# Patient Record
Sex: FEMALE | Race: BLACK OR AFRICAN AMERICAN | NOT HISPANIC OR LATINO | Employment: UNEMPLOYED | URBAN - METROPOLITAN AREA
[De-identification: names, ages, dates, MRNs, and addresses within clinical notes are randomized per-mention and may not be internally consistent; named-entity substitution may affect disease eponyms.]

---

## 2017-12-11 ENCOUNTER — HISTORICAL (OUTPATIENT)
Dept: INTERNAL MEDICINE | Facility: CLINIC | Age: 27
End: 2017-12-11

## 2017-12-11 LAB
ABS NEUT (OLG): 4.3 X10(3)/MCL (ref 2.1–9.2)
ALBUMIN SERPL-MCNC: 3.4 GM/DL (ref 3.4–5)
ALBUMIN/GLOB SERPL: 1 RATIO (ref 1–2)
ALP SERPL-CCNC: 84 UNIT/L (ref 45–117)
ALT SERPL-CCNC: 31 UNIT/L (ref 12–78)
APPEARANCE, UA: ABNORMAL
AST SERPL-CCNC: 20 UNIT/L (ref 15–37)
BACTERIA #/AREA URNS AUTO: ABNORMAL /[HPF]
BASOPHILS # BLD AUTO: 0.02 X10(3)/MCL
BASOPHILS NFR BLD AUTO: 0 % (ref 0–1)
BILIRUB SERPL-MCNC: 0.4 MG/DL (ref 0.2–1)
BILIRUB UR QL STRIP: NEGATIVE
BILIRUBIN DIRECT+TOT PNL SERPL-MCNC: 0.1 MG/DL
BILIRUBIN DIRECT+TOT PNL SERPL-MCNC: 0.3 MG/DL
BUN SERPL-MCNC: 15 MG/DL (ref 7–18)
CALCIUM SERPL-MCNC: 8.3 MG/DL (ref 8.5–10.1)
CHLORIDE SERPL-SCNC: 107 MMOL/L (ref 98–107)
CHOLEST SERPL-MCNC: 117 MG/DL
CHOLEST/HDLC SERPL: 2.2 {RATIO} (ref 0–4.4)
CO2 SERPL-SCNC: 27 MMOL/L (ref 21–32)
COLOR UR: YELLOW
CREAT SERPL-MCNC: 1 MG/DL (ref 0.6–1.3)
EOSINOPHIL # BLD AUTO: 0.1 10*3/UL
EOSINOPHIL NFR BLD AUTO: 1 % (ref 0–5)
ERYTHROCYTE [DISTWIDTH] IN BLOOD BY AUTOMATED COUNT: 14.3 % (ref 11.5–14.5)
EST. AVERAGE GLUCOSE BLD GHB EST-MCNC: 111 MG/DL
GLOBULIN SER-MCNC: 3.6 GM/ML (ref 2.3–3.5)
GLUCOSE (UA): NORMAL
GLUCOSE SERPL-MCNC: 98 MG/DL (ref 74–106)
HAV IGM SERPL QL IA: NONREACTIVE
HBA1C MFR BLD: 5.5 % (ref 4.2–6.3)
HBV CORE IGM SERPL QL IA: NONREACTIVE
HBV SURFACE AG SERPL QL IA: NEGATIVE
HCT VFR BLD AUTO: 37.2 % (ref 35–46)
HCV AB SERPL QL IA: NONREACTIVE
HDLC SERPL-MCNC: 54 MG/DL
HGB BLD-MCNC: 11.9 GM/DL (ref 12–16)
HGB UR QL STRIP: 0.1 MG/DL
HIV 1+2 AB+HIV1 P24 AG SERPL QL IA: NONREACTIVE
HYALINE CASTS #/AREA URNS LPF: ABNORMAL /[LPF]
IMM GRANULOCYTES # BLD AUTO: 0.02 10*3/UL
IMM GRANULOCYTES NFR BLD AUTO: 0 %
KETONES UR QL STRIP: NEGATIVE
LDLC SERPL CALC-MCNC: 47 MG/DL (ref 0–130)
LEUKOCYTE ESTERASE UR QL STRIP: NEGATIVE
LYMPHOCYTES # BLD AUTO: 2.92 X10(3)/MCL
LYMPHOCYTES NFR BLD AUTO: 38 % (ref 15–40)
MCH RBC QN AUTO: 29.2 PG (ref 26–34)
MCHC RBC AUTO-ENTMCNC: 32 GM/DL (ref 31–37)
MCV RBC AUTO: 91.2 FL (ref 80–100)
MONOCYTES # BLD AUTO: 0.33 X10(3)/MCL
MONOCYTES NFR BLD AUTO: 4 % (ref 4–12)
NEUTROPHILS # BLD AUTO: 4.3 X10(3)/MCL
NEUTROPHILS NFR BLD AUTO: 56 X10(3)/MCL
NITRITE UR QL STRIP: NEGATIVE
PH UR STRIP: 5.5 [PH] (ref 4.5–8)
PLATELET # BLD AUTO: 240 X10(3)/MCL (ref 130–400)
PMV BLD AUTO: 11.2 FL (ref 7.4–10.4)
POTASSIUM SERPL-SCNC: 3.8 MMOL/L (ref 3.5–5.1)
PROT SERPL-MCNC: 7 GM/DL (ref 6.4–8.2)
PROT UR QL STRIP: 10 MG/DL
RBC # BLD AUTO: 4.08 X10(6)/MCL (ref 4–5.2)
RBC #/AREA URNS AUTO: ABNORMAL /[HPF]
SODIUM SERPL-SCNC: 142 MMOL/L (ref 136–145)
SP GR UR STRIP: 1.02 (ref 1–1.03)
SQUAMOUS #/AREA URNS LPF: >100 /[LPF]
T PALLIDUM AB SER QL: NONREACTIVE
T4 FREE SERPL-MCNC: 0.96 NG/DL (ref 0.76–1.46)
TRIGL SERPL-MCNC: 80 MG/DL
TSH SERPL-ACNC: 5.72 MIU/L (ref 0.36–3.74)
UROBILINOGEN UR STRIP-ACNC: NORMAL
VLDLC SERPL CALC-MCNC: 16 MG/DL
WBC # SPEC AUTO: 7.7 X10(3)/MCL (ref 4.5–11)
WBC #/AREA URNS AUTO: ABNORMAL /HPF

## 2018-04-11 ENCOUNTER — HISTORICAL (OUTPATIENT)
Dept: INTERNAL MEDICINE | Facility: CLINIC | Age: 28
End: 2018-04-11

## 2018-04-11 LAB — TSH SERPL-ACNC: 2.12 MIU/L (ref 0.36–3.74)

## 2018-12-12 ENCOUNTER — HISTORICAL (OUTPATIENT)
Dept: ADMINISTRATIVE | Facility: HOSPITAL | Age: 28
End: 2018-12-12

## 2018-12-12 LAB
ABS NEUT (OLG): 5.39 X10(3)/MCL (ref 2.1–9.2)
ALBUMIN SERPL-MCNC: 3.5 GM/DL (ref 3.4–5)
ALBUMIN/GLOB SERPL: 1 RATIO (ref 1–2)
ALP SERPL-CCNC: 90 UNIT/L (ref 45–117)
ALT SERPL-CCNC: 33 UNIT/L (ref 12–78)
APPEARANCE, UA: ABNORMAL
AST SERPL-CCNC: 16 UNIT/L (ref 15–37)
BACTERIA #/AREA URNS AUTO: ABNORMAL /[HPF]
BASOPHILS # BLD AUTO: 0.03 X10(3)/MCL
BASOPHILS NFR BLD AUTO: 0 %
BILIRUB SERPL-MCNC: 0.5 MG/DL (ref 0.2–1)
BILIRUB UR QL STRIP: NEGATIVE
BILIRUBIN DIRECT+TOT PNL SERPL-MCNC: 0.1 MG/DL
BILIRUBIN DIRECT+TOT PNL SERPL-MCNC: 0.4 MG/DL
BUN SERPL-MCNC: 13 MG/DL (ref 7–18)
CALCIUM SERPL-MCNC: 8.7 MG/DL (ref 8.5–10.1)
CHLORIDE SERPL-SCNC: 109 MMOL/L (ref 98–107)
CHOLEST SERPL-MCNC: 121 MG/DL
CHOLEST/HDLC SERPL: 2.6 {RATIO} (ref 0–4.4)
CO2 SERPL-SCNC: 26 MMOL/L (ref 21–32)
COLOR UR: ABNORMAL
CREAT SERPL-MCNC: 0.9 MG/DL (ref 0.6–1.3)
EOSINOPHIL # BLD AUTO: 0.08 X10(3)/MCL
EOSINOPHIL NFR BLD AUTO: 1 %
ERYTHROCYTE [DISTWIDTH] IN BLOOD BY AUTOMATED COUNT: 14.6 % (ref 11.5–14.5)
EST. AVERAGE GLUCOSE BLD GHB EST-MCNC: 128 MG/DL
GLOBULIN SER-MCNC: 4.2 GM/ML (ref 2.3–3.5)
GLUCOSE (UA): NORMAL
GLUCOSE SERPL-MCNC: 86 MG/DL (ref 74–106)
HAV IGM SERPL QL IA: NONREACTIVE
HBA1C MFR BLD: 6.1 % (ref 4.2–6.3)
HBV CORE IGM SERPL QL IA: NONREACTIVE
HBV SURFACE AG SERPL QL IA: NEGATIVE
HCT VFR BLD AUTO: 38.6 % (ref 35–46)
HCV AB SERPL QL IA: NONREACTIVE
HDLC SERPL-MCNC: 46 MG/DL
HGB BLD-MCNC: 12.1 GM/DL (ref 12–16)
HGB UR QL STRIP: 0.03 MG/DL
HIV 1+2 AB+HIV1 P24 AG SERPL QL IA: NONREACTIVE
HYALINE CASTS #/AREA URNS LPF: ABNORMAL /[LPF]
IMM GRANULOCYTES # BLD AUTO: 0.01 10*3/UL
IMM GRANULOCYTES NFR BLD AUTO: 0 %
KETONES UR QL STRIP: NEGATIVE
LDLC SERPL CALC-MCNC: 56 MG/DL (ref 0–130)
LEUKOCYTE ESTERASE UR QL STRIP: NEGATIVE
LYMPHOCYTES # BLD AUTO: 2.54 X10(3)/MCL
LYMPHOCYTES NFR BLD AUTO: 30 % (ref 13–40)
MCH RBC QN AUTO: 27.9 PG (ref 26–34)
MCHC RBC AUTO-ENTMCNC: 31.3 GM/DL (ref 31–37)
MCV RBC AUTO: 88.9 FL (ref 80–100)
MONOCYTES # BLD AUTO: 0.35 X10(3)/MCL
MONOCYTES NFR BLD AUTO: 4 % (ref 4–12)
NEUTROPHILS # BLD AUTO: 5.39 X10(3)/MCL
NEUTROPHILS NFR BLD AUTO: 64 X10(3)/MCL
NITRITE UR QL STRIP: NEGATIVE
PH UR STRIP: 5.5 [PH] (ref 4.5–8)
PLATELET # BLD AUTO: 239 X10(3)/MCL (ref 130–400)
PMV BLD AUTO: 11.3 FL (ref 7.4–10.4)
POTASSIUM SERPL-SCNC: 4.1 MMOL/L (ref 3.5–5.1)
PROT SERPL-MCNC: 7.7 GM/DL (ref 6.4–8.2)
PROT UR QL STRIP: NEGATIVE
RBC # BLD AUTO: 4.34 X10(6)/MCL (ref 4–5.2)
RBC #/AREA URNS AUTO: ABNORMAL /[HPF]
SODIUM SERPL-SCNC: 140 MMOL/L (ref 136–145)
SP GR UR STRIP: 1.02 (ref 1–1.03)
SQUAMOUS #/AREA URNS LPF: >100 /[LPF]
T PALLIDUM AB SER QL: NONREACTIVE
TRIGL SERPL-MCNC: 93 MG/DL
TSH SERPL-ACNC: 2.97 MIU/L (ref 0.36–3.74)
UROBILINOGEN UR STRIP-ACNC: NORMAL
VLDLC SERPL CALC-MCNC: 19 MG/DL
WBC # SPEC AUTO: 8.4 X10(3)/MCL (ref 4.5–11)
WBC #/AREA URNS AUTO: ABNORMAL /HPF

## 2019-12-11 ENCOUNTER — HISTORICAL (OUTPATIENT)
Dept: INTERNAL MEDICINE | Facility: CLINIC | Age: 29
End: 2019-12-11

## 2019-12-11 LAB
ABS NEUT (OLG): 4.58 X10(3)/MCL (ref 2.1–9.2)
ALBUMIN SERPL-MCNC: 3.3 GM/DL (ref 3.4–5)
ALBUMIN/GLOB SERPL: 0.8 RATIO (ref 1.1–2)
ALP SERPL-CCNC: 89 UNIT/L (ref 45–117)
ALT SERPL-CCNC: 33 UNIT/L (ref 12–78)
APPEARANCE, UA: ABNORMAL
AST SERPL-CCNC: 18 UNIT/L (ref 15–37)
BACTERIA #/AREA URNS AUTO: ABNORMAL /HPF
BASOPHILS # BLD AUTO: 0 X10(3)/MCL (ref 0–0.2)
BASOPHILS NFR BLD AUTO: 0 %
BILIRUB SERPL-MCNC: 0.4 MG/DL (ref 0.2–1)
BILIRUB UR QL STRIP: NEGATIVE
BILIRUBIN DIRECT+TOT PNL SERPL-MCNC: 0.1 MG/DL (ref 0–0.2)
BILIRUBIN DIRECT+TOT PNL SERPL-MCNC: 0.3 MG/DL
BUN SERPL-MCNC: 13 MG/DL (ref 7–18)
CALCIUM SERPL-MCNC: 8.8 MG/DL (ref 8.5–10.1)
CHLORIDE SERPL-SCNC: 108 MMOL/L (ref 98–107)
CHOLEST SERPL-MCNC: 137 MG/DL
CHOLEST/HDLC SERPL: 2.5 {RATIO} (ref 0–4.4)
CO2 SERPL-SCNC: 30 MMOL/L (ref 21–32)
COLOR UR: YELLOW
CREAT SERPL-MCNC: 0.9 MG/DL (ref 0.6–1.3)
EOSINOPHIL # BLD AUTO: 0.1 X10(3)/MCL (ref 0–0.9)
EOSINOPHIL NFR BLD AUTO: 2 %
ERYTHROCYTE [DISTWIDTH] IN BLOOD BY AUTOMATED COUNT: 14.2 % (ref 11.5–14.5)
EST. AVERAGE GLUCOSE BLD GHB EST-MCNC: 126 MG/DL
GLOBULIN SER-MCNC: 3.9 GM/ML (ref 2.3–3.5)
GLUCOSE (UA): NEGATIVE
GLUCOSE SERPL-MCNC: 112 MG/DL (ref 74–106)
HAV IGM SERPL QL IA: NONREACTIVE
HBA1C MFR BLD: 6 % (ref 4.2–6.3)
HBV CORE IGM SERPL QL IA: NONREACTIVE
HBV SURFACE AG SERPL QL IA: NEGATIVE
HCT VFR BLD AUTO: 38.6 % (ref 35–46)
HCV AB SERPL QL IA: NONREACTIVE
HDLC SERPL-MCNC: 54 MG/DL (ref 40–59)
HGB BLD-MCNC: 11.8 GM/DL (ref 12–16)
HGB UR QL STRIP: 0.03 MG/DL
HIV 1+2 AB+HIV1 P24 AG SERPL QL IA: NONREACTIVE
HYALINE CASTS #/AREA URNS LPF: ABNORMAL /LPF
IMM GRANULOCYTES # BLD AUTO: 0.02 10*3/UL
IMM GRANULOCYTES NFR BLD AUTO: 0 %
KETONES UR QL STRIP: NEGATIVE
LDLC SERPL CALC-MCNC: 65 MG/DL
LEUKOCYTE ESTERASE UR QL STRIP: NEGATIVE
LYMPHOCYTES # BLD AUTO: 2.1 X10(3)/MCL (ref 0.6–4.6)
LYMPHOCYTES NFR BLD AUTO: 29 %
MCH RBC QN AUTO: 28.7 PG (ref 26–34)
MCHC RBC AUTO-ENTMCNC: 30.6 GM/DL (ref 31–37)
MCV RBC AUTO: 93.9 FL (ref 80–100)
MONOCYTES # BLD AUTO: 0.3 X10(3)/MCL (ref 0.1–1.3)
MONOCYTES NFR BLD AUTO: 5 %
NEUTROPHILS # BLD AUTO: 4.58 X10(3)/MCL (ref 2.1–9.2)
NEUTROPHILS NFR BLD AUTO: 64 %
NITRITE UR QL STRIP: NEGATIVE
PH UR STRIP: 5.5 [PH] (ref 4.5–8)
PLATELET # BLD AUTO: 231 X10(3)/MCL (ref 130–400)
PMV BLD AUTO: 11.1 FL (ref 7.4–10.4)
POTASSIUM SERPL-SCNC: 4.1 MMOL/L (ref 3.5–5.1)
PROT SERPL-MCNC: 7.2 GM/DL (ref 6.4–8.2)
PROT UR QL STRIP: NEGATIVE
RBC # BLD AUTO: 4.11 X10(6)/MCL (ref 4–5.2)
RBC #/AREA URNS AUTO: ABNORMAL /HPF
SODIUM SERPL-SCNC: 141 MMOL/L (ref 136–145)
SP GR UR STRIP: 1.02 (ref 1–1.03)
SQUAMOUS #/AREA URNS LPF: >100 /LPF
T PALLIDUM AB SER QL: NONREACTIVE
TRIGL SERPL-MCNC: 92 MG/DL
TSH SERPL-ACNC: 2.98 MIU/L (ref 0.36–3.74)
UROBILINOGEN UR STRIP-ACNC: NORMAL
VLDLC SERPL CALC-MCNC: 18 MG/DL
WBC # SPEC AUTO: 7.2 X10(3)/MCL (ref 4.5–11)
WBC #/AREA URNS AUTO: ABNORMAL /HPF

## 2019-12-12 ENCOUNTER — HISTORICAL (OUTPATIENT)
Dept: ADMINISTRATIVE | Facility: HOSPITAL | Age: 29
End: 2019-12-12

## 2019-12-12 LAB — B-HCG SERPL QL: NEGATIVE

## 2020-12-14 ENCOUNTER — HISTORICAL (OUTPATIENT)
Dept: ADMINISTRATIVE | Facility: HOSPITAL | Age: 30
End: 2020-12-14

## 2020-12-14 LAB
ABS NEUT (OLG): 4.69 X10(3)/MCL (ref 2.1–9.2)
ALBUMIN SERPL-MCNC: 3.6 GM/DL (ref 3.5–5)
ALBUMIN/GLOB SERPL: 0.9 RATIO (ref 1.1–2)
ALP SERPL-CCNC: 88 UNIT/L (ref 40–150)
ALT SERPL-CCNC: 28 UNIT/L (ref 0–55)
APPEARANCE, UA: ABNORMAL
AST SERPL-CCNC: 17 UNIT/L (ref 5–34)
BACTERIA #/AREA URNS AUTO: ABNORMAL /HPF
BASOPHILS # BLD AUTO: 0 X10(3)/MCL (ref 0–0.2)
BASOPHILS NFR BLD AUTO: 0 %
BILIRUB SERPL-MCNC: 0.4 MG/DL
BILIRUB UR QL STRIP: NEGATIVE
BILIRUBIN DIRECT+TOT PNL SERPL-MCNC: 0.2 MG/DL (ref 0–0.5)
BILIRUBIN DIRECT+TOT PNL SERPL-MCNC: 0.2 MG/DL (ref 0–0.8)
BUN SERPL-MCNC: 12 MG/DL (ref 7–18.7)
CALCIUM SERPL-MCNC: 9 MG/DL (ref 8.4–10.2)
CHLORIDE SERPL-SCNC: 105 MMOL/L (ref 98–107)
CHOLEST SERPL-MCNC: 118 MG/DL
CHOLEST/HDLC SERPL: 3 {RATIO} (ref 0–5)
CO2 SERPL-SCNC: 25 MMOL/L (ref 22–29)
COLOR UR: YELLOW
CREAT SERPL-MCNC: 0.81 MG/DL (ref 0.55–1.02)
EOSINOPHIL # BLD AUTO: 0.1 X10(3)/MCL (ref 0–0.9)
EOSINOPHIL NFR BLD AUTO: 1 %
ERYTHROCYTE [DISTWIDTH] IN BLOOD BY AUTOMATED COUNT: 14.6 % (ref 11.5–14.5)
EST. AVERAGE GLUCOSE BLD GHB EST-MCNC: 119.8 MG/DL
GLOBULIN SER-MCNC: 4 GM/DL (ref 2.4–3.5)
GLUCOSE (UA): NEGATIVE
GLUCOSE SERPL-MCNC: 112 MG/DL (ref 74–100)
HBA1C MFR BLD: 5.8 %
HCT VFR BLD AUTO: 33.8 % (ref 35–46)
HDLC SERPL-MCNC: 47 MG/DL (ref 35–60)
HGB BLD-MCNC: 9.8 GM/DL (ref 12–16)
HGB UR QL STRIP: 0.03 MG/DL
HIV 1+2 AB+HIV1 P24 AG SERPL QL IA: NONREACTIVE
HYALINE CASTS #/AREA URNS LPF: ABNORMAL /LPF
IMM GRANULOCYTES # BLD AUTO: 0.02 10*3/UL
IMM GRANULOCYTES NFR BLD AUTO: 0 %
KETONES UR QL STRIP: NEGATIVE
LDLC SERPL CALC-MCNC: 55 MG/DL (ref 50–140)
LEUKOCYTE ESTERASE UR QL STRIP: NEGATIVE
LYMPHOCYTES # BLD AUTO: 1.8 X10(3)/MCL (ref 0.6–4.6)
LYMPHOCYTES NFR BLD AUTO: 26 %
MCH RBC QN AUTO: 26.6 PG (ref 26–34)
MCHC RBC AUTO-ENTMCNC: 29 GM/DL (ref 31–37)
MCV RBC AUTO: 91.6 FL (ref 80–100)
MONOCYTES # BLD AUTO: 0.3 X10(3)/MCL (ref 0.1–1.3)
MONOCYTES NFR BLD AUTO: 4 %
NEUTROPHILS # BLD AUTO: 4.69 X10(3)/MCL (ref 2.1–9.2)
NEUTROPHILS NFR BLD AUTO: 68 %
NITRITE UR QL STRIP: NEGATIVE
PH UR STRIP: 5.5 [PH] (ref 4.5–8)
PLATELET # BLD AUTO: 281 X10(3)/MCL (ref 130–400)
PMV BLD AUTO: 11.1 FL (ref 7.4–10.4)
POTASSIUM SERPL-SCNC: 4.1 MMOL/L (ref 3.5–5.1)
PROT SERPL-MCNC: 7.6 GM/DL (ref 6.4–8.3)
PROT UR QL STRIP: 10 MG/DL
RBC # BLD AUTO: 3.69 X10(6)/MCL (ref 4–5.2)
RBC #/AREA URNS AUTO: ABNORMAL /HPF
SODIUM SERPL-SCNC: 139 MMOL/L (ref 136–145)
SP GR UR STRIP: 1.02 (ref 1–1.03)
SQUAMOUS #/AREA URNS LPF: >100 /LPF
TRIGL SERPL-MCNC: 81 MG/DL (ref 37–140)
TSH SERPL-ACNC: 2.47 UIU/ML (ref 0.35–4.94)
UROBILINOGEN UR STRIP-ACNC: NORMAL
VLDLC SERPL CALC-MCNC: 16 MG/DL
WBC # SPEC AUTO: 6.9 X10(3)/MCL (ref 4.5–11)
WBC #/AREA URNS AUTO: ABNORMAL /HPF

## 2021-01-14 ENCOUNTER — HISTORICAL (OUTPATIENT)
Dept: RADIOLOGY | Facility: HOSPITAL | Age: 31
End: 2021-01-14

## 2021-03-02 ENCOUNTER — HISTORICAL (OUTPATIENT)
Dept: RADIOLOGY | Facility: HOSPITAL | Age: 31
End: 2021-03-02

## 2021-11-02 ENCOUNTER — HISTORICAL (OUTPATIENT)
Dept: ADMINISTRATIVE | Facility: HOSPITAL | Age: 31
End: 2021-11-02

## 2021-12-07 ENCOUNTER — HISTORICAL (OUTPATIENT)
Dept: INTERNAL MEDICINE | Facility: CLINIC | Age: 31
End: 2021-12-07

## 2021-12-07 LAB
ABS NEUT (OLG): 4.61 X10(3)/MCL (ref 2.1–9.2)
ALBUMIN SERPL-MCNC: 3.5 GM/DL (ref 3.5–5)
ALBUMIN/GLOB SERPL: 1 RATIO (ref 1.1–2)
ALP SERPL-CCNC: 81 UNIT/L (ref 40–150)
ALT SERPL-CCNC: 26 UNIT/L (ref 0–55)
APPEARANCE, UA: CLEAR
AST SERPL-CCNC: 24 UNIT/L (ref 5–34)
BACTERIA #/AREA URNS AUTO: ABNORMAL /HPF
BASOPHILS # BLD AUTO: 0 X10(3)/MCL (ref 0–0.2)
BASOPHILS NFR BLD AUTO: 0 %
BILIRUB SERPL-MCNC: 0.4 MG/DL
BILIRUB UR QL STRIP: NEGATIVE
BILIRUBIN DIRECT+TOT PNL SERPL-MCNC: 0.2 MG/DL (ref 0–0.5)
BILIRUBIN DIRECT+TOT PNL SERPL-MCNC: 0.2 MG/DL (ref 0–0.8)
BUN SERPL-MCNC: 14 MG/DL (ref 7–18.7)
CALCIUM SERPL-MCNC: 9.1 MG/DL (ref 8.7–10.5)
CHLORIDE SERPL-SCNC: 107 MMOL/L (ref 98–107)
CHOLEST SERPL-MCNC: 132 MG/DL
CHOLEST/HDLC SERPL: 3 {RATIO} (ref 0–5)
CO2 SERPL-SCNC: 25 MMOL/L (ref 22–29)
COLOR UR: ABNORMAL
CREAT SERPL-MCNC: 0.77 MG/DL (ref 0.55–1.02)
EOSINOPHIL # BLD AUTO: 0.1 X10(3)/MCL (ref 0–0.9)
EOSINOPHIL NFR BLD AUTO: 2 %
ERYTHROCYTE [DISTWIDTH] IN BLOOD BY AUTOMATED COUNT: 14.1 % (ref 11.5–14.5)
EST. AVERAGE GLUCOSE BLD GHB EST-MCNC: 105.4 MG/DL
GLOBULIN SER-MCNC: 3.6 GM/DL (ref 2.4–3.5)
GLUCOSE (UA): NEGATIVE
GLUCOSE SERPL-MCNC: 106 MG/DL (ref 74–100)
HBA1C MFR BLD: 5.3 %
HCT VFR BLD AUTO: 36 % (ref 35–46)
HDLC SERPL-MCNC: 49 MG/DL (ref 35–60)
HGB BLD-MCNC: 11.3 GM/DL (ref 12–16)
HGB UR QL STRIP: 0.03 MG/DL
HYALINE CASTS #/AREA URNS LPF: ABNORMAL /LPF
IMM GRANULOCYTES # BLD AUTO: 0.03 10*3/UL
IMM GRANULOCYTES NFR BLD AUTO: 0 %
KETONES UR QL STRIP: NEGATIVE
LDLC SERPL CALC-MCNC: 67 MG/DL (ref 50–140)
LEUKOCYTE ESTERASE UR QL STRIP: NEGATIVE
LYMPHOCYTES # BLD AUTO: 2.1 X10(3)/MCL (ref 0.6–4.6)
LYMPHOCYTES NFR BLD AUTO: 29 %
MCH RBC QN AUTO: 28.8 PG (ref 26–34)
MCHC RBC AUTO-ENTMCNC: 31.4 GM/DL (ref 31–37)
MCV RBC AUTO: 91.6 FL (ref 80–100)
MONOCYTES # BLD AUTO: 0.3 X10(3)/MCL (ref 0.1–1.3)
MONOCYTES NFR BLD AUTO: 5 %
NEUTROPHILS # BLD AUTO: 4.61 X10(3)/MCL (ref 2.1–9.2)
NEUTROPHILS NFR BLD AUTO: 64 %
NITRITE UR QL STRIP: NEGATIVE
NRBC BLD AUTO-RTO: 0 % (ref 0–0.2)
PH UR STRIP: 5.5 [PH] (ref 4.5–8)
PLATELET # BLD AUTO: 232 X10(3)/MCL (ref 130–400)
PMV BLD AUTO: 11.1 FL (ref 7.4–10.4)
POTASSIUM SERPL-SCNC: 4.2 MMOL/L (ref 3.5–5.1)
PROT SERPL-MCNC: 7.1 GM/DL (ref 6.4–8.3)
PROT UR QL STRIP: NEGATIVE
RBC # BLD AUTO: 3.93 X10(6)/MCL (ref 4–5.2)
RBC #/AREA URNS AUTO: ABNORMAL /HPF
SODIUM SERPL-SCNC: 139 MMOL/L (ref 136–145)
SP GR UR STRIP: 1.02 (ref 1–1.03)
SQUAMOUS #/AREA URNS LPF: ABNORMAL /LPF
TRIGL SERPL-MCNC: 79 MG/DL (ref 37–140)
TSH SERPL-ACNC: 2.33 UIU/ML (ref 0.35–4.94)
UROBILINOGEN UR STRIP-ACNC: NORMAL
VLDLC SERPL CALC-MCNC: 16 MG/DL
WBC # SPEC AUTO: 7.2 X10(3)/MCL (ref 4.5–11)
WBC #/AREA URNS AUTO: ABNORMAL /HPF

## 2022-04-12 ENCOUNTER — HISTORICAL (OUTPATIENT)
Dept: ADMINISTRATIVE | Facility: HOSPITAL | Age: 32
End: 2022-04-12
Payer: MEDICAID

## 2022-04-30 VITALS
DIASTOLIC BLOOD PRESSURE: 81 MMHG | SYSTOLIC BLOOD PRESSURE: 122 MMHG | WEIGHT: 260.81 LBS | HEIGHT: 60 IN | BODY MASS INDEX: 51.2 KG/M2
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
## 2022-06-06 PROBLEM — G89.29 CHRONIC BILATERAL BACK PAIN: Status: ACTIVE | Noted: 2022-06-06

## 2022-06-06 PROBLEM — D50.0 IRON DEFICIENCY ANEMIA DUE TO CHRONIC BLOOD LOSS: Status: ACTIVE | Noted: 2022-06-06

## 2022-06-06 PROBLEM — R73.03 PREDIABETES: Status: ACTIVE | Noted: 2022-06-06

## 2022-06-06 PROBLEM — E66.01 MORBID OBESITY: Status: ACTIVE | Noted: 2022-06-06

## 2022-06-06 PROBLEM — M54.9 CHRONIC BILATERAL BACK PAIN: Status: ACTIVE | Noted: 2022-06-06
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
## 2022-06-08 ENCOUNTER — LAB VISIT (OUTPATIENT)
Dept: LAB | Facility: HOSPITAL | Age: 32
End: 2022-06-08
Attending: NURSE PRACTITIONER
Payer: MEDICAID

## 2022-06-08 ENCOUNTER — TELEPHONE (OUTPATIENT)
Dept: INTERNAL MEDICINE | Facility: CLINIC | Age: 32
End: 2022-06-08

## 2022-06-08 ENCOUNTER — OFFICE VISIT (OUTPATIENT)
Dept: INTERNAL MEDICINE | Facility: CLINIC | Age: 32
End: 2022-06-08
Payer: MEDICAID

## 2022-06-08 VITALS
SYSTOLIC BLOOD PRESSURE: 119 MMHG | HEIGHT: 60 IN | BODY MASS INDEX: 51.44 KG/M2 | TEMPERATURE: 98 F | RESPIRATION RATE: 20 BRPM | HEART RATE: 77 BPM | WEIGHT: 262 LBS | DIASTOLIC BLOOD PRESSURE: 75 MMHG

## 2022-06-08 DIAGNOSIS — D50.0 IRON DEFICIENCY ANEMIA DUE TO CHRONIC BLOOD LOSS: ICD-10-CM

## 2022-06-08 DIAGNOSIS — Z01.83 ENCOUNTER FOR BLOOD TYPING: ICD-10-CM

## 2022-06-08 DIAGNOSIS — N93.9 ABNORMAL UTERINE BLEEDING (AUB): ICD-10-CM

## 2022-06-08 DIAGNOSIS — N93.9 ABNORMAL UTERINE BLEEDING (AUB): Primary | ICD-10-CM

## 2022-06-08 LAB
ABORH RETYPE: NORMAL
B-HCG SERPL QL: NEGATIVE
BASOPHILS # BLD AUTO: 0.03 X10(3)/MCL (ref 0–0.2)
BASOPHILS NFR BLD AUTO: 0.3 %
EOSINOPHIL # BLD AUTO: 0.13 X10(3)/MCL (ref 0–0.9)
EOSINOPHIL NFR BLD AUTO: 1.5 %
ERYTHROCYTE [DISTWIDTH] IN BLOOD BY AUTOMATED COUNT: 14.2 % (ref 11.5–17)
FERRITIN SERPL-MCNC: 59.39 NG/ML (ref 4.63–204)
GROUP & RH: NORMAL
HCT VFR BLD AUTO: 40.3 % (ref 37–47)
HGB BLD-MCNC: 12.3 GM/DL (ref 12–16)
IMM GRANULOCYTES # BLD AUTO: 0.02 X10(3)/MCL (ref 0–0.02)
IMM GRANULOCYTES NFR BLD AUTO: 0.2 % (ref 0–0.43)
IRON SATN MFR SERPL: 34 % (ref 20–50)
IRON SERPL-MCNC: 113 UG/DL (ref 50–170)
LYMPHOCYTES # BLD AUTO: 2.42 X10(3)/MCL (ref 0.6–4.6)
LYMPHOCYTES NFR BLD AUTO: 27.9 %
MCH RBC QN AUTO: 29.2 PG (ref 27–31)
MCHC RBC AUTO-ENTMCNC: 30.5 MG/DL (ref 33–36)
MCV RBC AUTO: 95.7 FL (ref 80–94)
MONOCYTES # BLD AUTO: 0.39 X10(3)/MCL (ref 0.1–1.3)
MONOCYTES NFR BLD AUTO: 4.5 %
NEUTROPHILS # BLD AUTO: 5.7 X10(3)/MCL (ref 2.1–9.2)
NEUTROPHILS NFR BLD AUTO: 65.6 %
NRBC BLD AUTO-RTO: 0 %
PLATELET # BLD AUTO: 250 X10(3)/MCL (ref 130–400)
PMV BLD AUTO: 10.9 FL (ref 9.4–12.4)
RBC # BLD AUTO: 4.21 X10(6)/MCL (ref 4.2–5.4)
TIBC SERPL-MCNC: 217 UG/DL (ref 70–310)
TIBC SERPL-MCNC: 330 UG/DL (ref 250–450)
TRANSFERRIN SERPL-MCNC: 277 MG/DL (ref 180–382)
WBC # SPEC AUTO: 8.7 X10(3)/MCL (ref 4.5–11.5)

## 2022-06-08 PROCEDURE — 1160F RVW MEDS BY RX/DR IN RCRD: CPT | Mod: CPTII,,, | Performed by: NURSE PRACTITIONER

## 2022-06-08 PROCEDURE — 99215 OFFICE O/P EST HI 40 MIN: CPT | Mod: PBBFAC | Performed by: NURSE PRACTITIONER

## 2022-06-08 PROCEDURE — 3078F DIAST BP <80 MM HG: CPT | Mod: CPTII,,, | Performed by: NURSE PRACTITIONER

## 2022-06-08 PROCEDURE — 3078F PR MOST RECENT DIASTOLIC BLOOD PRESSURE < 80 MM HG: ICD-10-PCS | Mod: CPTII,,, | Performed by: NURSE PRACTITIONER

## 2022-06-08 PROCEDURE — 85025 COMPLETE CBC W/AUTO DIFF WBC: CPT

## 2022-06-08 PROCEDURE — 99214 OFFICE O/P EST MOD 30 MIN: CPT | Mod: S$PBB,,, | Performed by: NURSE PRACTITIONER

## 2022-06-08 PROCEDURE — 1159F PR MEDICATION LIST DOCUMENTED IN MEDICAL RECORD: ICD-10-PCS | Mod: CPTII,,, | Performed by: NURSE PRACTITIONER

## 2022-06-08 PROCEDURE — 36415 COLL VENOUS BLD VENIPUNCTURE: CPT

## 2022-06-08 PROCEDURE — 3074F SYST BP LT 130 MM HG: CPT | Mod: CPTII,,, | Performed by: NURSE PRACTITIONER

## 2022-06-08 PROCEDURE — 99214 PR OFFICE/OUTPT VISIT, EST, LEVL IV, 30-39 MIN: ICD-10-PCS | Mod: S$PBB,,, | Performed by: NURSE PRACTITIONER

## 2022-06-08 PROCEDURE — 1159F MED LIST DOCD IN RCRD: CPT | Mod: CPTII,,, | Performed by: NURSE PRACTITIONER

## 2022-06-08 PROCEDURE — 1160F PR REVIEW ALL MEDS BY PRESCRIBER/CLIN PHARMACIST DOCUMENTED: ICD-10-PCS | Mod: CPTII,,, | Performed by: NURSE PRACTITIONER

## 2022-06-08 PROCEDURE — 3074F PR MOST RECENT SYSTOLIC BLOOD PRESSURE < 130 MM HG: ICD-10-PCS | Mod: CPTII,,, | Performed by: NURSE PRACTITIONER

## 2022-06-08 PROCEDURE — 3008F BODY MASS INDEX DOCD: CPT | Mod: CPTII,,, | Performed by: NURSE PRACTITIONER

## 2022-06-08 PROCEDURE — 83540 ASSAY OF IRON: CPT

## 2022-06-08 PROCEDURE — 81025 URINE PREGNANCY TEST: CPT

## 2022-06-08 PROCEDURE — 86901 BLOOD TYPING SEROLOGIC RH(D): CPT | Performed by: NURSE PRACTITIONER

## 2022-06-08 PROCEDURE — 3008F PR BODY MASS INDEX (BMI) DOCUMENTED: ICD-10-PCS | Mod: CPTII,,, | Performed by: NURSE PRACTITIONER

## 2022-06-08 PROCEDURE — 82728 ASSAY OF FERRITIN: CPT

## 2022-06-08 RX ORDER — CHLORHEXIDINE GLUCONATE ORAL RINSE 1.2 MG/ML
SOLUTION DENTAL
COMMUNITY
Start: 2022-06-06

## 2022-06-08 RX ORDER — AMOXICILLIN 500 MG/1
500 CAPSULE ORAL 4 TIMES DAILY
COMMUNITY
Start: 2022-06-06 | End: 2023-04-03

## 2022-06-08 RX ORDER — IBUPROFEN 800 MG/1
800 TABLET ORAL EVERY 6 HOURS PRN
COMMUNITY
Start: 2022-06-06 | End: 2024-02-09 | Stop reason: SDUPTHER

## 2022-06-08 RX ORDER — DEXAMETHASONE 0.75 MG/1
0.75 TABLET ORAL DAILY
COMMUNITY
Start: 2022-06-06 | End: 2023-04-03

## 2022-06-08 NOTE — TELEPHONE ENCOUNTER
Please inform patient that:  H/H is now WNL and improved from several past assessments.    Iron studies and stores are all WNL.    Pregnancy test:    Latest Reference Range & Units 06/08/22 09:39   Preg Test, Ur Negative  Negative     Blood type:    06/08/22 09:39   Group & Rh O POS       Will await pelvic US and GYN appt.  ED for acute concerns.     No

## 2022-06-10 ENCOUNTER — HOSPITAL ENCOUNTER (OUTPATIENT)
Dept: RADIOLOGY | Facility: HOSPITAL | Age: 32
Discharge: HOME | End: 2022-06-10
Attending: NURSE PRACTITIONER
Payer: MEDICAID

## 2022-06-10 DIAGNOSIS — N93.9 ABNORMAL UTERINE BLEEDING (AUB): ICD-10-CM

## 2022-06-10 PROCEDURE — 76830 TRANSVAGINAL US NON-OB: CPT | Mod: TC

## 2022-06-14 ENCOUNTER — TELEPHONE (OUTPATIENT)
Dept: INTERNAL MEDICINE | Facility: CLINIC | Age: 32
End: 2022-06-14
Payer: MEDICAID

## 2022-06-14 NOTE — TELEPHONE ENCOUNTER
Overall, pelvic US did not reveal any overt abnormalities, other than a thickened lining of her uterus. This can change depending on where a person is in their menstrual cycle.    She was previously informed that her labs were unremarkable. So, the next steps are to wait for an appt in GYN as previously discussed. Can give her the number to GYN clinic so she can call and check status of appts.

## 2022-12-07 ENCOUNTER — HOSPITAL ENCOUNTER (EMERGENCY)
Facility: HOSPITAL | Age: 32
Discharge: HOME | End: 2022-12-07
Attending: FAMILY MEDICINE
Payer: MEDICAID

## 2022-12-07 VITALS
DIASTOLIC BLOOD PRESSURE: 79 MMHG | BODY MASS INDEX: 52.14 KG/M2 | WEIGHT: 265.56 LBS | TEMPERATURE: 99 F | RESPIRATION RATE: 18 BRPM | SYSTOLIC BLOOD PRESSURE: 154 MMHG | HEIGHT: 60 IN | OXYGEN SATURATION: 98 % | HEART RATE: 110 BPM

## 2022-12-07 DIAGNOSIS — J10.1 INFLUENZA A: Primary | ICD-10-CM

## 2022-12-07 DIAGNOSIS — R05.9 COUGH: ICD-10-CM

## 2022-12-07 LAB
B-HCG UR QL: NEGATIVE
CTP QC/QA: YES
FLUAV AG UPPER RESP QL IA.RAPID: DETECTED
FLUBV AG UPPER RESP QL IA.RAPID: NOT DETECTED
RSV A 5' UTR RNA NPH QL NAA+PROBE: NOT DETECTED
SARS-COV-2 RNA RESP QL NAA+PROBE: NOT DETECTED

## 2022-12-07 PROCEDURE — 99284 EMERGENCY DEPT VISIT MOD MDM: CPT | Mod: 25

## 2022-12-07 PROCEDURE — 25000242 PHARM REV CODE 250 ALT 637 W/ HCPCS: Performed by: PHYSICIAN ASSISTANT

## 2022-12-07 PROCEDURE — 81025 URINE PREGNANCY TEST: CPT | Performed by: PHYSICIAN ASSISTANT

## 2022-12-07 PROCEDURE — 94640 AIRWAY INHALATION TREATMENT: CPT

## 2022-12-07 PROCEDURE — 0241U COVID/RSV/FLU A&B PCR: CPT | Performed by: PHYSICIAN ASSISTANT

## 2022-12-07 RX ORDER — OSELTAMIVIR PHOSPHATE 75 MG/1
75 CAPSULE ORAL 2 TIMES DAILY
Qty: 10 CAPSULE | Refills: 0 | Status: SHIPPED | OUTPATIENT
Start: 2022-12-07 | End: 2022-12-12

## 2022-12-07 RX ORDER — IPRATROPIUM BROMIDE AND ALBUTEROL SULFATE 2.5; .5 MG/3ML; MG/3ML
9 SOLUTION RESPIRATORY (INHALATION)
Status: DISPENSED | OUTPATIENT
Start: 2022-12-07 | End: 2022-12-07

## 2022-12-07 RX ORDER — PROMETHAZINE HYDROCHLORIDE AND DEXTROMETHORPHAN HYDROBROMIDE 6.25; 15 MG/5ML; MG/5ML
5 SYRUP ORAL EVERY 6 HOURS PRN
Qty: 120 ML | Refills: 0 | Status: SHIPPED | OUTPATIENT
Start: 2022-12-07 | End: 2022-12-17

## 2022-12-07 RX ORDER — ALBUTEROL SULFATE 90 UG/1
2 AEROSOL, METERED RESPIRATORY (INHALATION) EVERY 6 HOURS PRN
Qty: 6.7 G | Refills: 0 | Status: SHIPPED | OUTPATIENT
Start: 2022-12-07 | End: 2023-04-03

## 2022-12-07 RX ADMIN — IPRATROPIUM BROMIDE AND ALBUTEROL SULFATE 9 ML: .5; 3 SOLUTION RESPIRATORY (INHALATION) at 08:12

## 2022-12-07 NOTE — Clinical Note
"Nikky Wray" Abel was seen and treated in our emergency department on 12/7/2022.  She may return to work on 12/12/2022.       If you have any questions or concerns, please don't hesitate to call.      MP Samuel"

## 2022-12-08 NOTE — ED PROVIDER NOTES
Encounter Date: 12/7/2022       History     Chief Complaint   Patient presents with    Cough    Nasal Congestion     Pt c/o cough, congestion, fever, chills, and body aches x 2 weeks. States nothing OTC has helped.     33 YO AAF in ER with complaints of flu like symptoms  X several days. She just got over a cold about 1 week ago. Having wheezing, fever, chills, body aches, cough and congestion. Denies chest pain, SOB, abdominal pain, N/V/D, HA or dizziness. No other complaints.     The history is provided by the patient.   Review of patient's allergies indicates:  No Known Allergies  History reviewed. No pertinent past medical history.  History reviewed. No pertinent surgical history.  Family History   Problem Relation Age of Onset    Diabetes Father     Hypertension Father     Diabetes Maternal Grandmother     Hypertension Maternal Grandmother     Diabetes Paternal Grandmother     Hypertension Paternal Grandmother      Social History     Tobacco Use    Smoking status: Never    Smokeless tobacco: Never   Substance Use Topics    Alcohol use: Yes     Alcohol/week: 2.0 standard drinks     Types: 2 Cans of beer per week    Drug use: Never     Review of Systems   Constitutional:  Positive for chills and fever.   HENT:  Positive for congestion and rhinorrhea. Negative for sore throat.    Respiratory:  Positive for cough and wheezing. Negative for shortness of breath.    Cardiovascular:  Negative for chest pain.   Gastrointestinal:  Positive for nausea. Negative for abdominal pain, diarrhea and vomiting.   Genitourinary:  Negative for dysuria.   Musculoskeletal:  Negative for back pain.   Skin:  Negative for rash.   Neurological:  Negative for dizziness, weakness, light-headedness and headaches.   Hematological:  Does not bruise/bleed easily.   All other systems reviewed and are negative.    Physical Exam     Initial Vitals [12/07/22 1809]   BP Pulse Resp Temp SpO2   137/82 94 18 97.5 °F (36.4 °C) 98 %      MAP       --          Physical Exam    Nursing note and vitals reviewed.  Constitutional: She appears well-developed and well-nourished. She is not diaphoretic. No distress.   HENT:   Head: Normocephalic and atraumatic.   Right Ear: Tympanic membrane normal.   Left Ear: Tympanic membrane normal.   Nose: Mucosal edema and rhinorrhea present.   Mouth/Throat: Oropharynx is clear and moist and mucous membranes are normal.   Eyes: Conjunctivae are normal.   Neck: Neck supple.   Cardiovascular:  Normal rate, regular rhythm and normal heart sounds.           Pulmonary/Chest: She has wheezes in the right upper field, the right middle field, the right lower field, the left upper field, the left middle field and the left lower field.   Musculoskeletal:         General: Normal range of motion.      Cervical back: Neck supple.     Neurological: She is alert and oriented to person, place, and time.   Skin: Skin is warm and dry.   Psychiatric: She has a normal mood and affect.       ED Course   Procedures  Labs Reviewed   COVID/RSV/FLU A&B PCR - Abnormal; Notable for the following components:       Result Value    Influenza A PCR Detected (*)     All other components within normal limits    Narrative:     The Xpert Xpress SARS-CoV-2/FLU/RSV plus is a rapid, multiplexed real-time PCR test intended for the simultaneous qualitative detection and differentiation of SARS-CoV-2, Influenza A, Influenza B, and respiratory syncytial virus (RSV) viral RNA in either nasopharyngeal swab or nasal swab specimens.         POCT URINE PREGNANCY          Imaging Results              X-Ray Chest 1 View (Final result)  Result time 12/07/22 21:02:39      Final result by Moris Segura MD (12/07/22 21:02:39)                   Narrative:    EXAMINATION  XR CHEST 1 VIEW    CLINICAL HISTORY  Cough, unspecified    TECHNIQUE  A total of 1 frontal image(s) of the chest.    COMPARISON  None available at the time of initial interpretation.    FINDINGS  Lines/tubes/devices:  none present    The cardiomediastinal silhouette and central pulmonary vasculature are unremarkable for utilized technique.  The trachea is midline. There is no lobar consolidation, pleural effusion, or pneumothorax.    There is no acute osseous or extrathoracic abnormality.    IMPRESSION  No convincing acute radiographic abnormality.      Electronically signed by: Moris Segura  Date:    12/07/2022  Time:    21:02                                     Medications   albuterol-ipratropium 2.5 mg-0.5 mg/3 mL nebulizer solution 9 mL (9 mLs Nebulization Given 12/7/22 2010)     Medical Decision Making:   Clinical Tests:   Lab Tests: Ordered and Reviewed       <> Summary of Lab: Flu A positive  Radiological Study: Ordered and Reviewed           ED Course as of 12/07/22 2151   Wed Dec 07, 2022   2148 VSS, NAD, pt is non-toxic or ill appearing, labs and imaging reviewed with pt, treatment plan and discharge instructions including follow up discussed, pt verbalized understanding, all questions answered, pt is stable and ready for discharge  [TT]      ED Course User Index  [TT] MP Samuel                 Clinical Impression:   Final diagnoses:  [R05.9] Cough  [J10.1] Influenza A (Primary)        ED Disposition Condition    Discharge Stable          ED Prescriptions       Medication Sig Dispense Start Date End Date Auth. Provider    albuterol (PROVENTIL/VENTOLIN HFA) 90 mcg/actuation inhaler Inhale 2 puffs into the lungs every 6 (six) hours as needed for Wheezing. 6.7 g 12/7/2022 12/7/2023 MP Samuel    oseltamivir (TAMIFLU) 75 MG capsule Take 1 capsule (75 mg total) by mouth 2 (two) times daily. for 5 days 10 capsule 12/7/2022 12/12/2022 MP Samuel    promethazine-dextromethorphan (PROMETHAZINE-DM) 6.25-15 mg/5 mL Syrp Take 5 mLs by mouth every 6 (six) hours as needed (cough). 120 mL 12/7/2022 12/17/2022 MP Samuel          Follow-up Information       Follow up With Specialties Details Why Contact Info     [facility] Schedule an appointment as soon as possible for a visit in 3 days  [address] [facility] [address]  [phone]      [facility] Emergency Medicine In 3 days As needed, If symptoms worsen [address] [facility] [id number] [phone]             MP Samuel  12/07/22 2155

## 2023-04-03 ENCOUNTER — OFFICE VISIT (OUTPATIENT)
Dept: INTERNAL MEDICINE | Facility: CLINIC | Age: 33
End: 2023-04-03
Payer: MEDICAID

## 2023-04-03 VITALS
HEIGHT: 60 IN | TEMPERATURE: 98 F | DIASTOLIC BLOOD PRESSURE: 82 MMHG | BODY MASS INDEX: 54.06 KG/M2 | RESPIRATION RATE: 20 BRPM | SYSTOLIC BLOOD PRESSURE: 138 MMHG | WEIGHT: 275.38 LBS | HEART RATE: 78 BPM

## 2023-04-03 DIAGNOSIS — R73.03 PREDIABETES: ICD-10-CM

## 2023-04-03 DIAGNOSIS — G89.29 CHRONIC PAIN OF RIGHT ANKLE: ICD-10-CM

## 2023-04-03 DIAGNOSIS — M25.571 CHRONIC PAIN OF RIGHT ANKLE: ICD-10-CM

## 2023-04-03 DIAGNOSIS — D50.0 IRON DEFICIENCY ANEMIA DUE TO CHRONIC BLOOD LOSS: ICD-10-CM

## 2023-04-03 DIAGNOSIS — G89.29 CHRONIC BILATERAL LOW BACK PAIN, UNSPECIFIED WHETHER SCIATICA PRESENT: Primary | ICD-10-CM

## 2023-04-03 DIAGNOSIS — M54.50 CHRONIC BILATERAL LOW BACK PAIN, UNSPECIFIED WHETHER SCIATICA PRESENT: Primary | ICD-10-CM

## 2023-04-03 DIAGNOSIS — E55.9 VITAMIN D DEFICIENCY: ICD-10-CM

## 2023-04-03 PROCEDURE — 3075F PR MOST RECENT SYSTOLIC BLOOD PRESS GE 130-139MM HG: ICD-10-PCS | Mod: CPTII,,, | Performed by: NURSE PRACTITIONER

## 2023-04-03 PROCEDURE — 3008F BODY MASS INDEX DOCD: CPT | Mod: CPTII,,, | Performed by: NURSE PRACTITIONER

## 2023-04-03 PROCEDURE — 3079F PR MOST RECENT DIASTOLIC BLOOD PRESSURE 80-89 MM HG: ICD-10-PCS | Mod: CPTII,,, | Performed by: NURSE PRACTITIONER

## 2023-04-03 PROCEDURE — 3075F SYST BP GE 130 - 139MM HG: CPT | Mod: CPTII,,, | Performed by: NURSE PRACTITIONER

## 2023-04-03 PROCEDURE — 3079F DIAST BP 80-89 MM HG: CPT | Mod: CPTII,,, | Performed by: NURSE PRACTITIONER

## 2023-04-03 PROCEDURE — 99214 OFFICE O/P EST MOD 30 MIN: CPT | Mod: PBBFAC | Performed by: NURSE PRACTITIONER

## 2023-04-03 PROCEDURE — 1159F MED LIST DOCD IN RCRD: CPT | Mod: CPTII,,, | Performed by: NURSE PRACTITIONER

## 2023-04-03 PROCEDURE — 1159F PR MEDICATION LIST DOCUMENTED IN MEDICAL RECORD: ICD-10-PCS | Mod: CPTII,,, | Performed by: NURSE PRACTITIONER

## 2023-04-03 PROCEDURE — 1160F RVW MEDS BY RX/DR IN RCRD: CPT | Mod: CPTII,,, | Performed by: NURSE PRACTITIONER

## 2023-04-03 PROCEDURE — 99214 OFFICE O/P EST MOD 30 MIN: CPT | Mod: S$PBB,,, | Performed by: NURSE PRACTITIONER

## 2023-04-03 PROCEDURE — 99214 PR OFFICE/OUTPT VISIT, EST, LEVL IV, 30-39 MIN: ICD-10-PCS | Mod: S$PBB,,, | Performed by: NURSE PRACTITIONER

## 2023-04-03 PROCEDURE — 1160F PR REVIEW ALL MEDS BY PRESCRIBER/CLIN PHARMACIST DOCUMENTED: ICD-10-PCS | Mod: CPTII,,, | Performed by: NURSE PRACTITIONER

## 2023-04-03 PROCEDURE — 3008F PR BODY MASS INDEX (BMI) DOCUMENTED: ICD-10-PCS | Mod: CPTII,,, | Performed by: NURSE PRACTITIONER

## 2023-04-03 RX ORDER — ERGOCALCIFEROL 1.25 MG/1
50000 CAPSULE ORAL
Qty: 4 CAPSULE | Refills: 2 | Status: SHIPPED | OUTPATIENT
Start: 2023-04-03 | End: 2024-01-19 | Stop reason: SDUPTHER

## 2023-04-03 RX ORDER — FERROUS SULFATE TAB 325 MG (65 MG ELEMENTAL FE) 325 (65 FE) MG
TAB ORAL 2 TIMES DAILY
COMMUNITY
Start: 2022-12-02

## 2023-04-03 NOTE — ASSESSMENT & PLAN NOTE
Recommendations:  Rest  Avoid heavy lifting  Heat/Ice therapy PRN  Analgesics PRN (i.e., NSAIDs, acetaminophen if not contraindicated)  Low back exercises    P.T.

## 2023-04-03 NOTE — ASSESSMENT & PLAN NOTE
HgA1c: 6.4  Prediabetes: 5.7%-6.4%  Diabetes: 6.5% or greater  Recommendations:  Educated on a diabetic diet- Low-fat, Low-carb, Low-cholesterol. Instructed to avoid excessive intake of sugary/dark drinks/sodas and white foods (i.e., bread, pasta, potatoes, rice).  Encouraged aerobic exercise: 20-30 min/day x 5 days/week.
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
## 2023-04-05 ENCOUNTER — LAB VISIT (OUTPATIENT)
Dept: LAB | Facility: HOSPITAL | Age: 33
End: 2023-04-05
Attending: NURSE PRACTITIONER
Payer: MEDICAID

## 2023-04-05 DIAGNOSIS — D50.0 IRON DEFICIENCY ANEMIA DUE TO CHRONIC BLOOD LOSS: ICD-10-CM

## 2023-04-05 LAB
FERRITIN SERPL-MCNC: 33.76 NG/ML (ref 4.63–204)
IRON SATN MFR SERPL: 10 % (ref 20–50)
IRON SERPL-MCNC: 35 UG/DL (ref 50–170)
TIBC SERPL-MCNC: 326 UG/DL (ref 70–310)
TIBC SERPL-MCNC: 361 UG/DL (ref 250–450)
TRANSFERRIN SERPL-MCNC: 325 MG/DL (ref 180–382)

## 2023-04-05 PROCEDURE — 36415 COLL VENOUS BLD VENIPUNCTURE: CPT

## 2023-04-05 PROCEDURE — 82728 ASSAY OF FERRITIN: CPT

## 2023-04-05 PROCEDURE — 83550 IRON BINDING TEST: CPT

## 2023-05-23 ENCOUNTER — OFFICE VISIT (OUTPATIENT)
Dept: INTERNAL MEDICINE | Facility: CLINIC | Age: 33
End: 2023-05-23
Payer: MEDICAID

## 2023-05-23 VITALS
HEART RATE: 62 BPM | HEIGHT: 60 IN | SYSTOLIC BLOOD PRESSURE: 137 MMHG | BODY MASS INDEX: 53.6 KG/M2 | RESPIRATION RATE: 20 BRPM | DIASTOLIC BLOOD PRESSURE: 85 MMHG | WEIGHT: 273 LBS | TEMPERATURE: 98 F

## 2023-05-23 DIAGNOSIS — G89.29 CHRONIC PAIN OF RIGHT ANKLE: ICD-10-CM

## 2023-05-23 DIAGNOSIS — G89.29 CHRONIC BILATERAL LOW BACK PAIN, UNSPECIFIED WHETHER SCIATICA PRESENT: ICD-10-CM

## 2023-05-23 DIAGNOSIS — M54.50 CHRONIC BILATERAL LOW BACK PAIN, UNSPECIFIED WHETHER SCIATICA PRESENT: ICD-10-CM

## 2023-05-23 DIAGNOSIS — M25.571 CHRONIC PAIN OF RIGHT ANKLE: ICD-10-CM

## 2023-05-23 DIAGNOSIS — R73.03 PREDIABETES: Primary | ICD-10-CM

## 2023-05-23 DIAGNOSIS — M25.571 PAIN IN JOINT INVOLVING RIGHT ANKLE AND FOOT: ICD-10-CM

## 2023-05-23 DIAGNOSIS — D50.0 IRON DEFICIENCY ANEMIA DUE TO CHRONIC BLOOD LOSS: ICD-10-CM

## 2023-05-23 PROCEDURE — 3075F SYST BP GE 130 - 139MM HG: CPT | Mod: CPTII,,, | Performed by: NURSE PRACTITIONER

## 2023-05-23 PROCEDURE — 99215 OFFICE O/P EST HI 40 MIN: CPT | Mod: PBBFAC | Performed by: NURSE PRACTITIONER

## 2023-05-23 PROCEDURE — 1160F RVW MEDS BY RX/DR IN RCRD: CPT | Mod: CPTII,,, | Performed by: NURSE PRACTITIONER

## 2023-05-23 PROCEDURE — 1159F MED LIST DOCD IN RCRD: CPT | Mod: CPTII,,, | Performed by: NURSE PRACTITIONER

## 2023-05-23 PROCEDURE — 3079F PR MOST RECENT DIASTOLIC BLOOD PRESSURE 80-89 MM HG: ICD-10-PCS | Mod: CPTII,,, | Performed by: NURSE PRACTITIONER

## 2023-05-23 PROCEDURE — 3008F PR BODY MASS INDEX (BMI) DOCUMENTED: ICD-10-PCS | Mod: CPTII,,, | Performed by: NURSE PRACTITIONER

## 2023-05-23 PROCEDURE — 1160F PR REVIEW ALL MEDS BY PRESCRIBER/CLIN PHARMACIST DOCUMENTED: ICD-10-PCS | Mod: CPTII,,, | Performed by: NURSE PRACTITIONER

## 2023-05-23 PROCEDURE — 3075F PR MOST RECENT SYSTOLIC BLOOD PRESS GE 130-139MM HG: ICD-10-PCS | Mod: CPTII,,, | Performed by: NURSE PRACTITIONER

## 2023-05-23 PROCEDURE — 99215 PR OFFICE/OUTPT VISIT, EST, LEVL V, 40-54 MIN: ICD-10-PCS | Mod: S$PBB,,, | Performed by: NURSE PRACTITIONER

## 2023-05-23 PROCEDURE — 1159F PR MEDICATION LIST DOCUMENTED IN MEDICAL RECORD: ICD-10-PCS | Mod: CPTII,,, | Performed by: NURSE PRACTITIONER

## 2023-05-23 PROCEDURE — 3079F DIAST BP 80-89 MM HG: CPT | Mod: CPTII,,, | Performed by: NURSE PRACTITIONER

## 2023-05-23 PROCEDURE — 99215 OFFICE O/P EST HI 40 MIN: CPT | Mod: S$PBB,,, | Performed by: NURSE PRACTITIONER

## 2023-05-23 PROCEDURE — 3008F BODY MASS INDEX DOCD: CPT | Mod: CPTII,,, | Performed by: NURSE PRACTITIONER

## 2023-05-23 NOTE — ASSESSMENT & PLAN NOTE
HgA1c: 6.4 (per OBGYN), HgA1c 5.3 (21)  Prediabetes: 5.7%-6.4%  Diabetes: 6.5% or greater  Recommendations:  Educated on a diabetic diet- Low-fat, Low-carb, Low-cholesterol. Instructed to avoid excessive intake of sugary/dark drinks/sodas and white foods (i.e., bread, pasta, potatoes, rice).  Encouraged aerobic exercise: 20-30 min/day x 5 days/week.     Encouraged patient to complete labs as ordered. Apologized for lab issue, however, did inform patient that she was supposed to repeat labs mx times in the past but due to frequent no shows (9/15/22, 22, and 3/2023), her orders were . She states OBGYN told her there is no such thing as prediabetes and that she needs to see a specialist. I did inform patient that Endo here will only see patients with a significantly elevated A1c; we will need updated labs. States uncertain if she wants to start treatment or not. Agreeable to completing labs.
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
## 2023-06-13 ENCOUNTER — TELEPHONE (OUTPATIENT)
Dept: INTERNAL MEDICINE | Facility: CLINIC | Age: 33
End: 2023-06-13
Payer: MEDICAID

## 2023-06-13 NOTE — TELEPHONE ENCOUNTER
Please inform patient that her insurance stated they will not pay for the MRI of her ankle. This is the reason why:    (physical therapy, chiropractic treatments, or medically directed home exercise program) for four weeks in the last six months. We also need to know the number of visits you went to. We need to know the dates you did those exercises and that you did not get better before we can review for an approval      She would at least need to attempt P.T. a little longer. Otherwise, I can refer her to Ortho if she'd like, but they did deny coverage.

## 2023-06-14 ENCOUNTER — HOSPITAL ENCOUNTER (OUTPATIENT)
Dept: RADIOLOGY | Facility: HOSPITAL | Age: 33
Discharge: HOME | End: 2023-06-14
Attending: NURSE PRACTITIONER
Payer: MEDICAID

## 2023-06-14 DIAGNOSIS — M54.50 CHRONIC BILATERAL LOW BACK PAIN, UNSPECIFIED WHETHER SCIATICA PRESENT: ICD-10-CM

## 2023-06-14 DIAGNOSIS — G89.29 CHRONIC BILATERAL LOW BACK PAIN, UNSPECIFIED WHETHER SCIATICA PRESENT: ICD-10-CM

## 2023-06-14 NOTE — TELEPHONE ENCOUNTER
----- Message from Blanca Aparicio sent at 6/14/2023 12:15 PM CDT -----  Regarding: referral  Patient states MRI of right ankle was denied, requesting referral sent to building behind [facility].

## 2023-06-14 NOTE — PROGRESS NOTES
MRI Lumbar W/O contrast unable to be completed due to patient's body habitus. Bore of scanner is too tight on patient's arms and would not allow table to advance. Patient given a list of other facilities with larger scanners that may be able to accommodate.

## 2023-06-15 ENCOUNTER — HOSPITAL ENCOUNTER (OUTPATIENT)
Dept: RADIOLOGY | Facility: HOSPITAL | Age: 33
Discharge: HOME | End: 2023-06-15
Attending: NURSE PRACTITIONER
Payer: MEDICAID

## 2023-06-15 ENCOUNTER — PATIENT MESSAGE (OUTPATIENT)
Dept: INTERNAL MEDICINE | Facility: CLINIC | Age: 33
End: 2023-06-15
Payer: MEDICAID

## 2023-06-15 DIAGNOSIS — M25.571 RIGHT ANKLE PAIN, UNSPECIFIED CHRONICITY: Primary | ICD-10-CM

## 2023-06-15 PROCEDURE — 72148 MRI LUMBAR SPINE W/O DYE: CPT | Mod: TC

## 2023-06-19 ENCOUNTER — TELEPHONE (OUTPATIENT)
Dept: INTERNAL MEDICINE | Facility: CLINIC | Age: 33
End: 2023-06-19
Payer: MEDICAID

## 2023-06-19 NOTE — TELEPHONE ENCOUNTER
Please inform patient that her lumbar MRI revealed that her L5-S1 is displaying s/s of degenerative changes of the spine. This can lead to pain and decreased flexibility.    She may be a candidate for spinal injections for pain relief. I can refer if she's interested.    Otherwise, I would recommend maintaining a healthy weight/BMI, avoiding heavy lifting, and using pain relievers (ie, IBU) as needed. I am aware that she's already done or is doing some P.T.    If she wants a surgical spine consult, I can refer to [place] or [place].

## 2023-06-21 NOTE — TELEPHONE ENCOUNTER
Please see past message about referral placed by RONA Lawrence NP for outpt therapy at [place]. Make sure this was sent.

## 2023-06-22 ENCOUNTER — TELEPHONE (OUTPATIENT)
Dept: INTERNAL MEDICINE | Facility: CLINIC | Age: 33
End: 2023-06-22
Payer: MEDICAID

## 2023-06-22 PROBLEM — M25.571 CHRONIC PAIN OF RIGHT ANKLE: Status: ACTIVE | Noted: 2023-06-22

## 2023-06-22 PROBLEM — G89.29 CHRONIC PAIN OF RIGHT ANKLE: Status: ACTIVE | Noted: 2023-06-22

## 2023-06-22 NOTE — ASSESSMENT & PLAN NOTE
Initial P.T. eval 4/17/23. At that point, her POC was to undergo P.T. 2x/week for 6 weeks. By 5/16/23, patient was showing little progress but was motivated to continue P.T.     Addendum: 6/22/23, contacted clinic with ongoing right ankle pain. MRI previously denied due to no P.T. despite patient starting P.T. in April 2023. She is still undergoing P.T. with [facility] with limited progress or relief in ankle symptoms. I will re-order study and refer to Ortho for further eval    [facility] progress note 6/16/23: pt related no improvement in joint pain.

## 2023-06-29 ENCOUNTER — TELEPHONE (OUTPATIENT)
Dept: INTERNAL MEDICINE | Facility: CLINIC | Age: 33
End: 2023-06-29
Payer: MEDICAID

## 2023-07-06 ENCOUNTER — TELEPHONE (OUTPATIENT)
Dept: INTERNAL MEDICINE | Facility: CLINIC | Age: 33
End: 2023-07-06
Payer: MEDICAID

## 2023-07-06 NOTE — TELEPHONE ENCOUNTER
I received notification from pre-auth that pt was unable to complete MRI R ankle today. I contacted the insurance a second time this am and was told that they are still awaiting clinic notes. I was under the impression that this was already done. Please fax my note from 5/23/23 and the latest P.T. notes to   [phone] today please!!! Also, upload the notes to [employer]    The tracking # is: [id number]    Reference: [id number]    Please notify patient and inform her that we will have to wait a few days to hear back from insurance. However, the order is still open. They are only awaiting more notes.

## 2023-07-10 ENCOUNTER — PATIENT MESSAGE (OUTPATIENT)
Dept: ADMINISTRATIVE | Facility: HOSPITAL | Age: 33
End: 2023-07-10
Payer: MEDICAID

## 2023-07-14 ENCOUNTER — HOSPITAL ENCOUNTER (OUTPATIENT)
Dept: RADIOLOGY | Facility: HOSPITAL | Age: 33
Discharge: HOME | End: 2023-07-14
Attending: NURSE PRACTITIONER
Payer: MEDICAID

## 2023-07-14 DIAGNOSIS — G89.29 CHRONIC PAIN OF RIGHT ANKLE: ICD-10-CM

## 2023-07-14 DIAGNOSIS — M25.571 CHRONIC PAIN OF RIGHT ANKLE: ICD-10-CM

## 2023-07-14 PROCEDURE — 73721 MRI JNT OF LWR EXTRE W/O DYE: CPT | Mod: TC,RT

## 2023-07-17 ENCOUNTER — PATIENT MESSAGE (OUTPATIENT)
Dept: INTERNAL MEDICINE | Facility: CLINIC | Age: 33
End: 2023-07-17
Payer: MEDICAID

## 2023-07-17 NOTE — TELEPHONE ENCOUNTER
MRI Right ankle completed. Patient will need to see Ortho. I already referred her and she has an appt 7/24. I advise her to keep appt.     Thanks

## 2023-07-24 ENCOUNTER — OFFICE VISIT (OUTPATIENT)
Dept: ORTHOPEDICS | Facility: CLINIC | Age: 33
End: 2023-07-24
Payer: MEDICAID

## 2023-07-24 VITALS
WEIGHT: 266 LBS | HEART RATE: 101 BPM | BODY MASS INDEX: 52.22 KG/M2 | SYSTOLIC BLOOD PRESSURE: 138 MMHG | HEIGHT: 60 IN | DIASTOLIC BLOOD PRESSURE: 94 MMHG

## 2023-07-24 DIAGNOSIS — M19.071 ARTHRITIS OF RIGHT ANKLE: Primary | ICD-10-CM

## 2023-07-24 DIAGNOSIS — G89.29 CHRONIC PAIN OF RIGHT ANKLE: ICD-10-CM

## 2023-07-24 DIAGNOSIS — M84.30XA STRESS REACTION OF BONE: ICD-10-CM

## 2023-07-24 DIAGNOSIS — M25.571 CHRONIC PAIN OF RIGHT ANKLE: ICD-10-CM

## 2023-07-24 PROCEDURE — 99213 OFFICE O/P EST LOW 20 MIN: CPT | Mod: PBBFAC
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
## 2023-10-13 ENCOUNTER — HOSPITAL ENCOUNTER (EMERGENCY)
Facility: HOSPITAL | Age: 33
Discharge: HOME | End: 2023-10-13
Attending: STUDENT IN AN ORGANIZED HEALTH CARE EDUCATION/TRAINING PROGRAM
Payer: MEDICAID

## 2023-10-13 VITALS
SYSTOLIC BLOOD PRESSURE: 154 MMHG | RESPIRATION RATE: 20 BRPM | DIASTOLIC BLOOD PRESSURE: 88 MMHG | HEART RATE: 70 BPM | OXYGEN SATURATION: 99 % | WEIGHT: 260.13 LBS | TEMPERATURE: 98 F | BODY MASS INDEX: 50.81 KG/M2

## 2023-10-13 DIAGNOSIS — S16.1XXA CERVICAL STRAIN, ACUTE, INITIAL ENCOUNTER: ICD-10-CM
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
## 2023-10-13 LAB
B-HCG UR QL: NEGATIVE
CTP QC/QA: YES

## 2023-10-13 PROCEDURE — 81025 URINE PREGNANCY TEST: CPT | Performed by: STUDENT IN AN ORGANIZED HEALTH CARE EDUCATION/TRAINING PROGRAM

## 2023-10-13 PROCEDURE — 99284 EMERGENCY DEPT VISIT MOD MDM: CPT | Mod: 25

## 2023-10-13 RX ORDER — CYCLOBENZAPRINE HCL 10 MG
10 TABLET ORAL 3 TIMES DAILY PRN
Qty: 21 TABLET | Refills: 0 | Status: SHIPPED | OUTPATIENT
Start: 2023-10-13 | End: 2023-10-23
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
## 2023-10-24 ENCOUNTER — HOSPITAL ENCOUNTER (EMERGENCY)
Facility: HOSPITAL | Age: 33
Discharge: HOME | End: 2023-10-24
Attending: EMERGENCY MEDICINE
Payer: MEDICAID

## 2023-10-24 VITALS
WEIGHT: 260 LBS | TEMPERATURE: 98 F | HEART RATE: 74 BPM | SYSTOLIC BLOOD PRESSURE: 142 MMHG | DIASTOLIC BLOOD PRESSURE: 78 MMHG | HEIGHT: 60 IN | OXYGEN SATURATION: 98 % | RESPIRATION RATE: 18 BRPM | BODY MASS INDEX: 51.04 KG/M2

## 2023-10-24 DIAGNOSIS — N93.8 DYSFUNCTIONAL UTERINE BLEEDING: Primary | ICD-10-CM

## 2023-10-24 LAB
ALBUMIN SERPL-MCNC: 3.6 G/DL (ref 3.5–5)
ALBUMIN/GLOB SERPL: 1 RATIO (ref 1.1–2)
ALP SERPL-CCNC: 90 UNIT/L (ref 40–150)
ALT SERPL-CCNC: 25 UNIT/L (ref 0–55)
APTT PPP: 24.4 SECONDS (ref 23.2–33.7)
AST SERPL-CCNC: 27 UNIT/L (ref 5–34)
BASOPHILS # BLD AUTO: 0.02 X10(3)/MCL
BASOPHILS NFR BLD AUTO: 0.4 %
BILIRUB SERPL-MCNC: 0.4 MG/DL
BUN SERPL-MCNC: 10 MG/DL (ref 7–18.7)
CALCIUM SERPL-MCNC: 9 MG/DL (ref 8.4–10.2)
CHLORIDE SERPL-SCNC: 107 MMOL/L (ref 98–107)
CO2 SERPL-SCNC: 25 MMOL/L (ref 22–29)
CREAT SERPL-MCNC: 0.84 MG/DL (ref 0.55–1.02)
EOSINOPHIL # BLD AUTO: 0.12 X10(3)/MCL (ref 0–0.9)
EOSINOPHIL NFR BLD AUTO: 2.1 %
ERYTHROCYTE [DISTWIDTH] IN BLOOD BY AUTOMATED COUNT: 17.6 % (ref 11.5–17)
GFR SERPLBLD CREATININE-BSD FMLA CKD-EPI: >60 MLS/MIN/1.73/M2
GLOBULIN SER-MCNC: 3.6 GM/DL (ref 2.4–3.5)
GLUCOSE SERPL-MCNC: 134 MG/DL (ref 74–100)
HCT VFR BLD AUTO: 32.3 % (ref 37–47)
HGB BLD-MCNC: 9 G/DL (ref 12–16)
IMM GRANULOCYTES # BLD AUTO: 0 X10(3)/MCL (ref 0–0.04)
IMM GRANULOCYTES NFR BLD AUTO: 0 %
INR PPP: 1
LYMPHOCYTES # BLD AUTO: 1.35 X10(3)/MCL (ref 0.6–4.6)
LYMPHOCYTES NFR BLD AUTO: 24 %
MCH RBC QN AUTO: 23.9 PG (ref 27–31)
MCHC RBC AUTO-ENTMCNC: 27.9 G/DL (ref 33–36)
MCV RBC AUTO: 85.9 FL (ref 80–94)
MONOCYTES # BLD AUTO: 0.25 X10(3)/MCL (ref 0.1–1.3)
MONOCYTES NFR BLD AUTO: 4.4 %
NEUTROPHILS # BLD AUTO: 3.88 X10(3)/MCL (ref 2.1–9.2)
NEUTROPHILS NFR BLD AUTO: 69.1 %
PLATELET # BLD AUTO: 235 X10(3)/MCL (ref 130–400)
PMV BLD AUTO: 10.4 FL (ref 7.4–10.4)
POTASSIUM SERPL-SCNC: 4.5 MMOL/L (ref 3.5–5.1)
PROT SERPL-MCNC: 7.2 GM/DL (ref 6.4–8.3)
PROTHROMBIN TIME: 10.5 SECONDS (ref 12.5–14.5)
RBC # BLD AUTO: 3.76 X10(6)/MCL (ref 4.2–5.4)
SODIUM SERPL-SCNC: 140 MMOL/L (ref 136–145)
WBC # SPEC AUTO: 5.62 X10(3)/MCL (ref 4.5–11.5)

## 2023-10-24 PROCEDURE — 85025 COMPLETE CBC W/AUTO DIFF WBC: CPT | Performed by: EMERGENCY MEDICINE

## 2023-10-24 PROCEDURE — 85730 THROMBOPLASTIN TIME PARTIAL: CPT | Performed by: EMERGENCY MEDICINE

## 2023-10-24 PROCEDURE — 99284 EMERGENCY DEPT VISIT MOD MDM: CPT

## 2023-10-24 PROCEDURE — 80053 COMPREHEN METABOLIC PANEL: CPT | Performed by: EMERGENCY MEDICINE

## 2023-10-24 PROCEDURE — 85610 PROTHROMBIN TIME: CPT | Performed by: EMERGENCY MEDICINE

## 2023-10-24 RX ORDER — ONDANSETRON 4 MG/1
4 TABLET, FILM COATED ORAL EVERY 6 HOURS
Qty: 12 TABLET | Refills: 0 | Status: SHIPPED | OUTPATIENT
Start: 2023-10-24

## 2023-10-24 RX ORDER — NORGESTIMATE AND ETHINYL ESTRADIOL 0.25-0.035
1 KIT ORAL DAILY
Qty: 30 TABLET | Refills: 0 | Status: SHIPPED | OUTPATIENT
Start: 2023-10-24 | End: 2023-11-23

## 2023-10-24 NOTE — ED PROVIDER NOTES
Encounter Date: 10/24/2023       History     Chief Complaint   Patient presents with    Vaginal Bleeding     For last 3 months every day. States it makes her anemic.  [facility] Was prescribed tranexamic acid but did not work.  Had d&c in January and a uterine polyp was removed.  Had negative pregnancy test [facility] er on October 13.       This 33-year-old female presents with complaints of persistent vaginal bleeding for the past 3 months. She has a years long h/o DUB and was last seen by Artemio Davis OB/[facility] who performed a D&C in January 23 removing polypoid tissue.       Review of patient's allergies indicates:  No Known Allergies  Past Medical History:   Diagnosis Date    Chronic low back pain     Dysfunctional uterine bleeding     Iron deficiency anemia, unspecified     Morbid obesity      Past Surgical History:   Procedure Laterality Date    DILATION AND CURETTAGE OF UTERUS  01/2023     Family History   Problem Relation Age of Onset    Diabetes Father     Hypertension Father     Diabetes Maternal Grandmother     Hypertension Maternal Grandmother     Diabetes Paternal Grandmother     Hypertension Paternal Grandmother      Social History     Tobacco Use    Smoking status: Never     Passive exposure: Current    Smokeless tobacco: Never   Substance Use Topics    Alcohol use: Not Currently     Alcohol/week: 2.0 standard drinks of alcohol     Types: 2 Cans of beer per week    Drug use: Never     Review of Systems   Constitutional:  Negative for fever.   HENT:  Negative for sore throat.    Respiratory:  Negative for shortness of breath.    Cardiovascular:  Negative for chest pain.   Gastrointestinal:  Negative for nausea.   Genitourinary:  Positive for vaginal bleeding. Negative for dysuria.   Musculoskeletal:  Negative for back pain.   Skin:  Negative for rash.   Neurological:  Negative for weakness.   Hematological:  Does not bruise/bleed easily.       Physical Exam     Initial Vitals  [10/24/23 1023]   BP Pulse Resp Temp SpO2   (!) 168/95 78 18 97.6 °F (36.4 °C) 99 %      MAP       --         Physical Exam    Nursing note and vitals reviewed.  Constitutional: She appears well-developed and well-nourished.   HENT:   Head: Normocephalic and atraumatic.   Eyes: Conjunctivae and EOM are normal. Pupils are equal, round, and reactive to light.   Neck: Neck supple.   Normal range of motion.  Cardiovascular:  Normal rate, regular rhythm, normal heart sounds and intact distal pulses.           Pulmonary/Chest: Breath sounds normal.   Abdominal: Abdomen is soft. Bowel sounds are normal.   Musculoskeletal:         General: Normal range of motion.      Cervical back: Normal range of motion and neck supple.     Neurological: She is alert and oriented to person, place, and time. She has normal strength.   Skin: Skin is warm and dry. Capillary refill takes less than 2 seconds.   Psychiatric: She has a normal mood and affect. Her behavior is normal. Judgment and thought content normal.         ED Course   Procedures  Labs Reviewed   COMPREHENSIVE METABOLIC PANEL - Abnormal; Notable for the following components:       Result Value    Glucose Level 134 (*)     Globulin 3.6 (*)     Albumin/Globulin Ratio 1.0 (*)     All other components within normal limits   CBC WITH DIFFERENTIAL - Abnormal; Notable for the following components:    RBC 3.76 (*)     Hgb 9.0 (*)     Hct 32.3 (*)     MCH 23.9 (*)     MCHC 27.9 (*)     RDW 17.6 (*)     All other components within normal limits   CBC W/ AUTO DIFFERENTIAL    Narrative:     The following orders were created for panel order CBC auto differential.  Procedure                               Abnormality         Status                     ---------                               -----------         ------                     CBC with Differential[[id number]]        Abnormal            Final result                 Please view results for these tests on the individual orders.    APTT   PROTIME-INR          Imaging Results    None          Medications - No data to display  Medical Decision Making  This 33-year-old female presents with complaints of persistent vaginal bleeding for the past 3 months. She has a years long h/o DUB and was last seen by Artemio Davis OB/GY[facility] who performed a D&C in January 23 removing polypoid tissue.     Amount and/or Complexity of Data Reviewed  Labs: ordered. Decision-making details documented in ED Course.     Details: HGB today is 9 and last recorded was 12.3 in June of 22.                                Clinical Impression:   Final diagnoses:  [N93.8] Dysfunctional uterine bleeding (Primary)        ED Disposition Condition    Discharge Stable          ED Prescriptions       Medication Sig Dispense Start Date End Date Auth. Provider    norgestimate-ethinyl estradioL (ORTHO-CYCLEN) 0.25-35 mg-mcg per tablet Take 1 tablet by mouth once daily. 30 tablet 10/24/2023 11/23/2023 Michael Lora MD          Follow-up Information       Follow up With Specialties Details Why Contact Info    Sukh Davis MD Obstetrics and Gynecology Call in 1 week  [address]  [address]  [facility] [address]  [phone]               Michael Lora MD  10/24/23 6511

## 2024-01-07 ENCOUNTER — HOSPITAL ENCOUNTER (EMERGENCY)
Facility: HOSPITAL | Age: 34
Discharge: HOME | End: 2024-01-07
Attending: EMERGENCY MEDICINE
Payer: MEDICAID

## 2024-01-07 VITALS
SYSTOLIC BLOOD PRESSURE: 150 MMHG | RESPIRATION RATE: 18 BRPM | BODY MASS INDEX: 51.04 KG/M2 | WEIGHT: 260 LBS | DIASTOLIC BLOOD PRESSURE: 82 MMHG | HEIGHT: 60 IN | HEART RATE: 85 BPM | TEMPERATURE: 99 F | OXYGEN SATURATION: 96 %

## 2024-01-07 DIAGNOSIS — R05.9 COUGH: ICD-10-CM

## 2024-01-07 DIAGNOSIS — B34.9 VIRAL SYNDROME: Primary | ICD-10-CM

## 2024-01-07 LAB
FLUAV AG UPPER RESP QL IA.RAPID: NOT DETECTED
FLUBV AG UPPER RESP QL IA.RAPID: NOT DETECTED
SARS-COV-2 RNA RESP QL NAA+PROBE: NOT DETECTED

## 2024-01-07 PROCEDURE — 96372 THER/PROPH/DIAG INJ SC/IM: CPT | Performed by: PHYSICIAN ASSISTANT

## 2024-01-07 PROCEDURE — 99284 EMERGENCY DEPT VISIT MOD MDM: CPT | Mod: 25

## 2024-01-07 PROCEDURE — 63600175 PHARM REV CODE 636 W HCPCS: Performed by: PHYSICIAN ASSISTANT

## 2024-01-07 PROCEDURE — 0240U COVID/FLU A&B PCR: CPT | Performed by: EMERGENCY MEDICINE

## 2024-01-07 RX ORDER — BENZONATATE 100 MG/1
200 CAPSULE ORAL 3 TIMES DAILY PRN
Qty: 20 CAPSULE | Refills: 0 | Status: SHIPPED | OUTPATIENT
Start: 2024-01-07 | End: 2024-01-17

## 2024-01-07 RX ORDER — DEXAMETHASONE SODIUM PHOSPHATE 4 MG/ML
8 INJECTION, SOLUTION INTRA-ARTICULAR; INTRALESIONAL; INTRAMUSCULAR; INTRAVENOUS; SOFT TISSUE
Status: COMPLETED | OUTPATIENT
Start: 2024-01-07 | End: 2024-01-07

## 2024-01-07 RX ORDER — ALBUTEROL SULFATE 90 UG/1
2 AEROSOL, METERED RESPIRATORY (INHALATION) EVERY 6 HOURS PRN
Qty: 18 G | Refills: 0 | Status: SHIPPED | OUTPATIENT
Start: 2024-01-07 | End: 2025-01-06

## 2024-01-07 RX ADMIN — DEXAMETHASONE SODIUM PHOSPHATE 8 MG: 4 INJECTION, SOLUTION INTRA-ARTICULAR; INTRALESIONAL; INTRAMUSCULAR; INTRAVENOUS; SOFT TISSUE at 05:01

## 2024-01-07 NOTE — DISCHARGE INSTRUCTIONS
Use the ProAir inhaler 2 puffs every 6 hours as needed for wheezing and shortness of breath.  Take the Tessalon Perles 1 3 times a day as needed for cough.  Tylenol and Motrin for fever or body aches.  Claritin or Zyrtec for congestion.  Follow up with your primary care doctor in 3-4 days.  Return to the ER for worsening symptoms or condition.

## 2024-01-07 NOTE — ED PROVIDER NOTES
Encounter Date: 1/7/2024       History     Chief Complaint   Patient presents with    Cough     Pt c/o cough, congestion, and nausea x 1 week      Patient presents to ER today with complaint of cough, congestion, nausea.  This is been ongoing for 1 week.  No fever.        Review of patient's allergies indicates:  No Known Allergies  Past Medical History:   Diagnosis Date    Chronic low back pain     Dysfunctional uterine bleeding     Iron deficiency anemia, unspecified     Morbid obesity      Past Surgical History:   Procedure Laterality Date    DILATION AND CURETTAGE OF UTERUS  01/2023     Family History   Problem Relation Age of Onset    Diabetes Father     Hypertension Father     Diabetes Maternal Grandmother     Hypertension Maternal Grandmother     Diabetes Paternal Grandmother     Hypertension Paternal Grandmother      Social History     Tobacco Use    Smoking status: Never     Passive exposure: Current    Smokeless tobacco: Never   Substance Use Topics    Alcohol use: Not Currently     Alcohol/week: 2.0 standard drinks of alcohol     Types: 2 Cans of beer per week    Drug use: Never     Review of Systems   HENT:  Positive for congestion.    Respiratory:  Positive for cough.    Gastrointestinal:  Positive for nausea.   All other systems reviewed and are negative.      Physical Exam     Initial Vitals [01/07/24 1641]   BP Pulse Resp Temp SpO2   (!) 150/82 85 18 99 °F (37.2 °C) 96 %      MAP       --         Physical Exam    Nursing note and vitals reviewed.  Constitutional: She appears well-developed and well-nourished.   HENT:   Head: Normocephalic and atraumatic.   Right Ear: External ear normal.   Left Ear: External ear normal.   Nose: Nose normal.   Mouth/Throat: Oropharynx is clear and moist.   Eyes: Conjunctivae and EOM are normal. Pupils are equal, round, and reactive to light.   Neck: Neck supple.   Normal range of motion.  Cardiovascular:  Normal rate, regular rhythm, normal heart sounds and intact  distal pulses.           Pulmonary/Chest: Breath sounds normal.   Musculoskeletal:         General: Normal range of motion.      Cervical back: Normal range of motion and neck supple.     Neurological: She is alert and oriented to person, place, and time. She has normal strength.   Skin: Skin is warm and dry.   Psychiatric: She has a normal mood and affect. Her behavior is normal. Judgment and thought content normal.         ED Course   Procedures  Labs Reviewed   COVID/FLU A&B PCR - Normal    Narrative:     The Xpert Xpress SARS-CoV-2/FLU/RSV plus is a rapid, multiplexed real-time PCR test intended for the simultaneous qualitative detection and differentiation of SARS-CoV-2, Influenza A, Influenza B, and respiratory syncytial virus (RSV) viral RNA in either nasopharyngeal swab or nasal swab specimens.                Imaging Results              X-Ray Chest PA And Lateral (Final result)  Result time 01/07/24 17:16:03      Final result by Zane Saravia MD (01/07/24 17:16:03)                   Impression:      No acute cardiopulmonary process identified.      Electronically signed by: Zane Saravia  Date:    01/07/2024  Time:    17:16               Narrative:    EXAMINATION:  Chest radiograph    CLINICAL HISTORY:  Cough, unspecified    TECHNIQUE:  One view    COMPARISON:  December 7, 2022.    FINDINGS:  Cardiopericardial silhouette is within normal limits. Lungs are without dense focal or segmental consolidation, congestion, pleural effusion or pneumothorax.                                       Medications - No data to display  Medical Decision Making  Amount and/or Complexity of Data Reviewed  Radiology: ordered.    Risk  Prescription drug management.                                      Clinical Impression:  Final diagnoses:  [R05.9] Cough  [B34.9] Viral syndrome (Primary)          ED Disposition Condition    Discharge Stable          ED Prescriptions       Medication Sig Dispense Start Date End Date Auth.  Provider    albuterol (PROAIR HFA) 90 mcg/actuation inhaler Inhale 2 puffs into the lungs every 6 (six) hours as needed for Wheezing. Rescue 18 g 1/7/2024 1/6/2025 Shelbie Sauceda PA    benzonatate (TESSALON) 100 MG capsule Take 2 capsules (200 mg total) by mouth 3 (three) times daily as needed for Cough. 20 capsule 1/7/2024 1/17/2024 Shelbie Sauceda PA          Follow-up Information       Follow up With Specialties Details Why Contact Info    [facility]    [phone]             Shelbie Sauceda PA  01/07/24 0567

## 2024-01-16 DIAGNOSIS — E55.9 VITAMIN D DEFICIENCY: ICD-10-CM

## 2024-01-19 RX ORDER — ERGOCALCIFEROL 1.25 MG/1
50000 CAPSULE ORAL
Qty: 4 CAPSULE | Refills: 2 | Status: SHIPPED | OUTPATIENT
Start: 2024-01-19 | End: 2024-02-09

## 2024-02-09 ENCOUNTER — OFFICE VISIT (OUTPATIENT)
Dept: INTERNAL MEDICINE | Facility: CLINIC | Age: 34
End: 2024-02-09
Payer: MEDICAID

## 2024-02-09 ENCOUNTER — HOSPITAL ENCOUNTER (OUTPATIENT)
Dept: RADIOLOGY | Facility: HOSPITAL | Age: 34
Discharge: HOME | End: 2024-02-09
Attending: NURSE PRACTITIONER
Payer: MEDICAID

## 2024-02-09 VITALS
WEIGHT: 253 LBS | DIASTOLIC BLOOD PRESSURE: 73 MMHG | HEIGHT: 60 IN | TEMPERATURE: 98 F | SYSTOLIC BLOOD PRESSURE: 126 MMHG | HEART RATE: 82 BPM | BODY MASS INDEX: 49.67 KG/M2 | RESPIRATION RATE: 18 BRPM

## 2024-02-09 DIAGNOSIS — G89.29 CHRONIC BILATERAL LOW BACK PAIN, UNSPECIFIED WHETHER SCIATICA PRESENT: ICD-10-CM

## 2024-02-09 DIAGNOSIS — E55.9 VITAMIN D DEFICIENCY: ICD-10-CM

## 2024-02-09 DIAGNOSIS — R51.9 NONINTRACTABLE HEADACHE, UNSPECIFIED CHRONICITY PATTERN, UNSPECIFIED HEADACHE TYPE: ICD-10-CM

## 2024-02-09 DIAGNOSIS — G89.29 CHRONIC PAIN OF RIGHT ANKLE: ICD-10-CM

## 2024-02-09 DIAGNOSIS — M25.562 CHRONIC PAIN OF LEFT KNEE: ICD-10-CM

## 2024-02-09 DIAGNOSIS — G89.29 CHRONIC PAIN OF LEFT KNEE: ICD-10-CM

## 2024-02-09 DIAGNOSIS — R55 SYNCOPE AND COLLAPSE: ICD-10-CM

## 2024-02-09 DIAGNOSIS — R53.83 FATIGUE, UNSPECIFIED TYPE: ICD-10-CM

## 2024-02-09 DIAGNOSIS — M25.562 CHRONIC PAIN OF LEFT KNEE: Primary | ICD-10-CM

## 2024-02-09 DIAGNOSIS — G89.29 CHRONIC PAIN OF LEFT KNEE: Primary | ICD-10-CM

## 2024-02-09 DIAGNOSIS — R73.03 PREDIABETES: ICD-10-CM

## 2024-02-09 DIAGNOSIS — M54.50 CHRONIC BILATERAL LOW BACK PAIN, UNSPECIFIED WHETHER SCIATICA PRESENT: ICD-10-CM

## 2024-02-09 DIAGNOSIS — M25.571 CHRONIC PAIN OF RIGHT ANKLE: ICD-10-CM

## 2024-02-09 DIAGNOSIS — Z13.6 SCREENING FOR HYPERTENSION: ICD-10-CM

## 2024-02-09 DIAGNOSIS — D50.0 IRON DEFICIENCY ANEMIA DUE TO CHRONIC BLOOD LOSS: ICD-10-CM

## 2024-02-09 DIAGNOSIS — Z00.00 WELL ADULT EXAM: ICD-10-CM

## 2024-02-09 DIAGNOSIS — J32.9 RECURRENT SINUSITIS: Primary | ICD-10-CM

## 2024-02-09 DIAGNOSIS — R55 SYNCOPE, UNSPECIFIED SYNCOPE TYPE: ICD-10-CM

## 2024-02-09 DIAGNOSIS — N93.9 ABNORMAL UTERINE BLEEDING (AUB): ICD-10-CM

## 2024-02-09 PROCEDURE — 3078F DIAST BP <80 MM HG: CPT | Mod: CPTII,,, | Performed by: NURSE PRACTITIONER

## 2024-02-09 PROCEDURE — 3074F SYST BP LT 130 MM HG: CPT | Mod: CPTII,,, | Performed by: NURSE PRACTITIONER

## 2024-02-09 PROCEDURE — 3008F BODY MASS INDEX DOCD: CPT | Mod: CPTII,,, | Performed by: NURSE PRACTITIONER

## 2024-02-09 PROCEDURE — 99215 OFFICE O/P EST HI 40 MIN: CPT | Mod: PBBFAC,25 | Performed by: NURSE PRACTITIONER

## 2024-02-09 PROCEDURE — 73560 X-RAY EXAM OF KNEE 1 OR 2: CPT | Mod: TC,LT

## 2024-02-09 PROCEDURE — 3044F HG A1C LEVEL LT 7.0%: CPT | Mod: CPTII,,, | Performed by: NURSE PRACTITIONER

## 2024-02-09 PROCEDURE — 1159F MED LIST DOCD IN RCRD: CPT | Mod: CPTII,,, | Performed by: NURSE PRACTITIONER

## 2024-02-09 PROCEDURE — 99214 OFFICE O/P EST MOD 30 MIN: CPT | Mod: S$PBB,,, | Performed by: NURSE PRACTITIONER

## 2024-02-09 RX ORDER — IBUPROFEN 800 MG/1
800 TABLET ORAL 3 TIMES DAILY PRN
Qty: 90 TABLET | Refills: 2 | Status: SHIPPED | OUTPATIENT
Start: 2024-02-09 | End: 2024-05-24 | Stop reason: SDUPTHER

## 2024-02-09 RX ORDER — CHOLECALCIFEROL (VITAMIN D3) 50 MCG
2000 TABLET ORAL DAILY
Qty: 30 TABLET | Refills: 0 | COMMUNITY
Start: 2024-02-09

## 2024-02-09 NOTE — PROGRESS NOTES
Patient ID: [id number]     Chief Complaint: Establish Care        HPI:     HPI      Nikky Roman is a 33 y.o. female here today to establish care. Pt has hx iron def anemia, Chronic pain right ankle, chronic low back pain. Pt states she is unable to work however has not been deemed disabled. Pt had MRI L-spine done 6-15-23 for back pain.         Immunizations:   Immunization History   Administered Date(s) Administered    DTP 1989, 1989, 02/06/1990, 08/21/1990, 02/25/1994    HIB 08/21/1990    Hepatitis B 08/03/2001, 09/07/2001    Hepatitis B, Pediatric/Adolescent 08/07/2003    MMR 08/21/1990, 02/25/1994    OPV 1989, 1989, 08/21/1990, 02/25/1994    Td (ADULT) 09/07/2001        ----------------------------  Chronic low back pain  Dysfunctional uterine bleeding  Iron deficiency anemia, unspecified  Morbid obesity     Past Surgical History:   Procedure Laterality Date    DILATION AND CURETTAGE OF UTERUS  01/2023       Review of patient's allergies indicates:  No Known Allergies    Current Outpatient Medications   Medication Instructions    albuterol (PROAIR HFA) 90 mcg/actuation inhaler 2 puffs, Inhalation, Every 6 hours PRN, Rescue    chlorhexidine (PERIDEX) 0.12 % solution SMARTSIG:By Mouth    ergocalciferol (ERGOCALCIFEROL) 50,000 Units, Oral, Every 7 days    FEROSUL 325 mg (65 mg iron) Tab tablet Oral, 2 times daily    ibuprofen (ADVIL,MOTRIN) 800 mg, Oral, Every 6 hours PRN    norgestimate-ethinyl estradioL (ORTHO-CYCLEN) 0.25-35 mg-mcg per tablet 1 tablet, Oral, Daily    ondansetron (ZOFRAN) 4 mg, Oral, Every 6 hours       Social History     Socioeconomic History    Marital status: Single    Number of children: 0   Tobacco Use    Smoking status: Never     Passive exposure: Current    Smokeless tobacco: Never   Substance and Sexual Activity    Alcohol use: Not Currently     Alcohol/week: 2.0 standard drinks of alcohol     Types: 2 Cans of beer per week    Drug use: Never     Sexual activity: Yes     Partners: Male     Social Determinants of Health     Financial Resource Strain: High Risk (4/3/2023)    Overall Financial Resource Strain (CARDIA)     Difficulty of Paying Living Expenses: Very hard   Food Insecurity: No Food Insecurity (4/3/2023)    Hunger Vital Sign     Worried About Running Out of Food in the Last Year: Never true     Ran Out of Food in the Last Year: Never true   Transportation Needs: Unmet Transportation Needs (4/3/2023)    PRAPARE - Transportation     Lack of Transportation (Medical): Yes     Lack of Transportation (Non-Medical): Yes   Physical Activity: Inactive (4/3/2023)    Exercise Vital Sign     Days of Exercise per Week: 0 days     Minutes of Exercise per Session: 0 min   Stress: No Stress Concern Present (4/3/2023)    [employer] [place] [employer] - Occupational Stress Questionnaire     Feeling of Stress : Not at all   Social Connections: Moderately Isolated (4/3/2023)    Social Connection and Isolation Panel [NHANES]     Frequency of Communication with Friends and Family: More than three times a week     Frequency of Social Gatherings with Friends and Family: Twice a week     Attends Mosque Services: More than 4 times per year     Active Member of Clubs or Organizations: No     Attends Club or Organization Meetings: Never     Marital Status: Never    Housing Stability: High Risk (4/3/2023)    Housing Stability Vital Sign     Unable to Pay for Housing in the Last Year: Yes     Number of Places Lived in the Last Year: 1     Unstable Housing in the Last Year: No        Family History   Problem Relation Age of Onset    Diabetes Father     Hypertension Father     Diabetes Maternal Grandmother     Hypertension Maternal Grandmother     Diabetes Paternal Grandmother     Hypertension Paternal Grandmother         Patient Care Team:  Marilee Jimenez FNP as PCP - General (Family Medicine)  Margaret Hansen LPN as Care  Coordinator     Subjective:     Review of Systems     See HPI for details    Constitutional: Denies Change in appetite. Denies Chills. Denies Fever. Denies Night sweats.  Eye: Denies Blurred vision. Denies Discharge. Denies Eye pain.  ENT: Denies Decreased hearing. Denies Sore throat. Denies Swollen glands.  Respiratory: Denies Cough. Denies Shortness of breath. Denies Shortness of breath with exertion. Denies Wheezing.  Cardiovascular: DeniesChest pain at rest. Denies Chest pain with exertion. Denies Irregular heartbeat. Denies Palpitations. Denies Edema.  Gastrointestinal: Denies Abdominal pain. DeniesDiarrhea. Denies Nausea. Denies Vomiting. Denies Hematemesis or Hematochezia.  Genitourinary: Denies Dysuria. Denies Urinary frequency. Denies Urinary urgency. Denies Blood in urine.  Endocrine: Denies Cold intolerance. Denies Excessive thirst. Denies Heat intolerance. Denies Weight loss. Denies Weight gain.  Musculoskeletal: Denies Painful joints. Denies Weakness.  Integumentary: Denies Rash. Denies Itching. Denies Dry skin.  Neurologic: Denies Dizziness. Denies Fainting. Denies Headache.  Psychiatric: Denies Depression. Denies Anxiety. Denies Suicidal/Homicidal ideations.    All Other ROS: Negative except as stated in HPI.       Objective:     Visit Vitals  /73 (BP Location: Left arm, Patient Position: Sitting, BP Method: Large (Automatic))   Pulse 82   Temp 98.2 °F (36.8 °C) (Oral)   Resp 18   Ht 5' (1.524 m)   Wt 114.8 kg (253 lb)   BMI 49.41 kg/m²       Physical Exam    General: Alert and oriented, No acute distress.  Head: Normocephalic, Atraumatic.  Eye: Pupils are equal, round and reactive to light, Extraocular movements are intact, Sclera non-icteric. Mucous membranes pink and moist. Capillary refill < 3 secs to bilat fingers.   Ears/Nose/Throat: Normal, Mucosa moist,Clear.  Neck/Thyroid: Supple, Non-tender, No carotid bruit, No lymphadenopathy, No JVD, Full range of motion.  Respiratory: Clear to  auscultation bilaterally; No wheezes, rales or rhonchi,Non-labored respirations, Symmetrical chest wall expansion.  Cardiovascular: Regular rate and rhythm, S1/S2 normal, No murmurs, rubs or gallops.   Gastrointestinal: Soft, Non-tender, Non-distended, Normal bowel sounds, No palpable organomegaly.  Musculoskeletal: Normal range of motion. + grinding noted to left upper outer aspect of knee with AROM.   Integumentary: Warm, Dry, Intact, No suspicious lesions or rashes.  Extremities: No clubbing, cyanosis or edema  Neurologic: No focal deficits, Cranial Nerves II-XII are grossly intact, Motor strength normal upper and lower extremities, Sensory exam intact.  Psychiatric: Normal interaction, Coherent speech, Euthymic mood, Appropriate affect       Labs Reviewed:     Chemistry:  Lab Results   Component Value Date     10/24/2023    K 4.5 10/24/2023    CHLORIDE 107 10/24/2023    BUN 10.0 10/24/2023    CREATININE 0.84 10/24/2023    EGFRNORACEVR >60 10/24/2023    GLUCOSE 134 (H) 10/24/2023    CALCIUM 9.0 10/24/2023    ALKPHOS 90 10/24/2023    LABPROT 7.2 10/24/2023    ALBUMIN 3.6 10/24/2023    BILIDIR 0.2 12/07/2021    IBILI 0.20 12/07/2021    AST 27 10/24/2023    ALT 25 10/24/2023    MG 2.0 07/24/2017        Lab Results   Component Value Date    HGBA1C 5.3 12/07/2021        Hematology:  Lab Results   Component Value Date    WBC 5.62 10/24/2023    HGB 9.0 (L) 10/24/2023    HCT 32.3 (L) 10/24/2023     10/24/2023       Lipid Panel:  Lab Results   Component Value Date    CHOL 132 12/07/2021    HDL 49 12/07/2021    LDL 67.00 12/07/2021    TRIG 79 12/07/2021    TOTALCHOLEST 3 12/07/2021        Urine:  Lab Results   Component Value Date    APPEARANCEUA Clear 12/07/2021    PROTEINUA Negative 12/07/2021    LEUKOCYTESUR Negative 12/07/2021    RBCUA 0-2 12/07/2021    WBCUA 3-5 (A) 12/07/2021    BACTERIA Rare 12/07/2021    SQEPUA  (A) 12/07/2021    HYALINECASTS 0-2 (A) 12/07/2021        Assessment:       ICD-10-CM  ICD-9-CM   1. Recurrent sinusitis  J32.9 473.9   2. Nonintractable headache, unspecified chronicity pattern, unspecified headache type  R51.9 784.0   3. Abnormal uterine bleeding (AUB)  N93.9 626.9   4. Iron deficiency anemia due to chronic blood loss  D50.0 280.0   5. Chronic pain of right ankle  M25.571 719.47    G89.29 338.29   6. Chronic bilateral low back pain, unspecified whether sciatica present  M54.50 724.2    G89.29 338.29   7. Screening for hypertension  Z13.6 V81.1   8. Well adult exam  Z00.00 V70.0   9. Fatigue, unspecified type  R53.83 780.79   10. Chronic pain of left knee  M25.562 719.46    G89.29 338.29        Plan:     1. Recurrent sinusitis  Denies at present.     2. Nonintractable headache, unspecified chronicity pattern, unspecified headache type  Pt states HA comes and goes. Denies changes in vision. Will get CT head in 1 month for eval of HA and episodes of syncope.     3. Abnormal uterine bleeding (AUB)  Pt followed by Dr Ryan CONNER for AUB and is s/p D&C last year and was diagnosed with uterine polyp. Pt states she has not had any further heavy bleeding and only spotting. Pt states she has not had a f/u visit with Dr Davis since D&C. Informed to call and schedule a f/u annual exam. Pt states she was prescribed Tranexamic acid for heavy menstrual bleeding but she is not taking.     4. Iron deficiency anemia due to chronic blood loss  Pt states she only takes iron supplements as needed due to s/e. Will re-eval of anemia with next labs in 1 month. Pt states she was having episodes of passing out when she was having the heavy menstrual bleeding but states she has not had episode in a while since bleeding has not been so heavy.     5. Chronic pain of right ankle  Pt has been having right ankle pain for a couple of years. States she sustained an ankle fracture and was followed in Ortho cl here. See Ortho note 7-24-23:  Plan:      MDM: Prior external referring provider notes reviewed. Prior  external referring provider studies reviewed.   Dx:  Subchondral stress reaction on the both sides of the posterior facet of the subtalar joint / early stages of talocalcaneal arthritis   Treatment Plan:   - Discussed with patient diagnosis, prognosis, and treatment recommendations. Education provided.    .- recommended over-the-counter shoe orthotics   - recommended the patient wear  athletic footwear with arch support and stay away from flip-flops/sandals  - recommend the patient continue with physical therapy to maximize her ankle range of motion  Over the counter NSAID and/or tylenol provided you do not have contraindications such as but not limited to liver or kidney disease or uncontrolled blood pressure. If you're doctors have told you to to not take them based on your health, do not take them.   - can consider a subtalar joint diagnostic/therapeutic CSI in the future (over 6 weeks from now to give time for the stress reaction to heal)  -recommended and encouraged weight loss  Imaging: radiological studies ordered and independently reviewed; discussed with patient; pending radiologist interpretation.   Weight Management: is paramount. BMI >35; recommend to discuss with PCP about bariatric surgery options if applicable. A bmi 24.9 or less may provide further relief..   Procedure: Discussed CSI/VSI as treatment options; discussed CSI vs VS injections as treatment options in future if conservative measures do not improve symptoms.  Activity: Activity as tolerated; HEP to include aerobic conditioning and strength training with non-painful activity. ROM/STG exercises. Proper footware; assistive devises to avoid limping.   Therapy: Physical Therapy  RTC: PRN; call if any issues.       This note is dictated using the M*Modal Fluency Direct word recognition program. There are word recognition mistakes that are occasionally missed on review.     Shira Toscano MD  Sports Medicine Fellow          Electronically  signed by Zohaib Mishra MD at 8/7/2023 11:21 AM     MRI Right ankle done 7-14-23 showing:  MRI Ankle Without Contrast Right  Order: [id number]  Status: Final result       Visible to patient: Yes (seen)       Next appt: None       Dx: Chronic pain of right ankle    0 Result Notes  Details    Reading Physician Reading Date Result Priority   Jacinto Treviño MD  [phone] 7/17/2023 Routine     Narrative & Impression  EXAMINATION:  MRI ANKLE WITHOUT CONTRAST RIGHT     CLINICAL HISTORY:  Ankle pain, tendon abnormality suspected, neg xray;  Pain in right ankle and joints of right foot     TECHNIQUE:  Unenhanced, multiplanar, multisequence MR imaging of the right ankle was obtained.     COMPARISON:  Radiographs right ankle used for comparison dated 11/02/2021     FINDINGS:  The syndesmosis is intact.  The talofibular ligaments are intact.  Deltoid ligament complex is intact.  Spring ligament is intact.  Lisfranc ligament is intact.     Subchondral edema is present the posterior facet of the subtalar joint along with a subtalar joint effusion.  Degenerative calcaneal enthesopathy is present.  The Achilles tendon is normal in course and caliber.  The 2 cm ganglion extends from the talonavicular joint deep to the extensor digitorum tendons.  The peroneal tendons and posterior tendons are normal in course and caliber.  Sinus tarsi is unremarkable.  Plantar fasciitis is present at the origin of the middle band.     Impression:     Subchondral stress reaction is present on both sides of the posterior facet of the subtalar joint and is nonspecific.     Degenerative plantar calcaneal enthesopathy which is somewhat advanced for the patient's age.  Plantar fasciitis is present at the origin of the middle band.     A 2 cm ganglion extends from the talonavicular joint deep to the extensor digitorum tendons.        Electronically signed by: Jacinto Treviño  Date:                                            07/17/2023  Time:                                            10:57           Exam Ended: 07/14/23 13:04 CDT Last Resulted: 07/17/23 10:57 CDT           Pt states she was unhappy with the determination in care she received here at [facility]. Pt requesting to be referred to an outside Ortho specialits. Informed pt due to her medicaid coverage there are no local Ortho Mds that accept medicaid so will need referral to [facility]. Refer to Ortho at [facility] in [facility] for further eval and mgmt.     6. Chronic bilateral low back pain, unspecified whether sciatica present  Pt has been having low back pain since the age of 6. Pt states she is followed by Dr Yo for Chiropractic care in [place]. Pt had MRI L-spine done 6-15-23 showing:  MRI Lumbar Spine Without Contrast  Order: [id number]  Status: Final result       Visible to patient: Yes (seen)       Next appt: None       Dx: Chronic bilateral low back pain, unsp...    0 Result Notes       1 Follow-up Encounter  Details    Reading Physician Reading Date Result Priority   Zane Saravia MD  [phone] 6/15/2023 Routine     Narrative & Impression  EXAMINATION:  MRI LUMBAR SPINE WITHOUT CONTRAST     TECHNIQUE:  Low back pain, symptoms persist with > 6wks conservative treatment;Low back pain, unspecified     COMPARISON:  None available     FINDINGS:  For the purpose of this report, the most inferior well developed intervertebral disc space is presumed to represent L5-S1. Lumbar vertebrae stature and alignment is is unremarkable.  There are no acute marrow edematous signals.  The visualized thoracic cord is unremarkable. The conus medullaris terminates at L1.  Disc segmental analysis is given below:     At L1-L2, disc is unremarkable.  Central canal is not stenosed and there are no narrowings of the neural foramen.     At L2-L3, disc is unremarkable.  Central canal is not stenosed and there are no narrowings of the neural foramen.     At L3-L4, disc height is preserved.  There is no central  canal stenosis and there are no narrowings of the neural foramen.     At L4-L5, disc height is preserved.  Central canal is not stenosed and there are no narrowings are neural foramen.     The L5-S1 disc is desiccated.  There is L5-S1 bulging of annulus fibrosis which slightly flattens the ventral thecal sac.  There is no significant central canal stenosis.  There are also no significant narrowings of the neural foramen.     Impression:     L5-S1 disc desiccation and bulging of annulus fibrosis slightly flattening the ventral thecal sac.  Otherwise, no significant lumbar degenerative disc disease and spondylosis.        Electronically signed by: Zane Saravia  Date:                                            06/15/2023  Time:                                           12:56           Exam Ended: 06/15/23 12:04 CDT Last Resulted: 06/15/23 12:56 CDT           Informed pt she will need to seek SSI disability determination to qualify for SNAP disability benefits. Pt states she has applied and is awaiting the denial letter. Pt denies being referred to Neuro surgery for eval and txmt. Refer to Neuro surgery in [facility] for further eval and mgmt.     7. Screening for hypertension  Labs in 1 month.     8. Well adult exam  Labs in 1 month. Pt [facility] [place]. Informed to call and schedule f/u appt.      9. Chronic left knee pain  Pt states she has been having left knee pain > 1 year. Pt requesting XR done today.     10. Syncope  Pt states she get episodes of weakness but has not had passing out episode since heavy menstrual bleeding has not been occurring. Will get CT head and Holter monitor in 1 month. ER precautions given.     Follow up in about 1 month (around 3/9/2024) for with labs 1 week prior to appt. . In addition to their scheduled follow up, the patient has also been instructed to follow up on as needed basis.     No future appointments.     Marilee Jimenez,  FNP

## 2024-02-09 NOTE — PROGRESS NOTES
Lab reviewed. Discussed results with Dr Barron concerning H/H 7.1/ 26.2. Informed pt not taking Iron as prescribed. Dr Barron states pt either has to take po iron as prescribed or refer to infusion clinic for iron infusions. Called pt and informed of results and recommendations for anemia. Pt states the last dose of iron that she has taken was 1-2 weeks ago and was only taking once daily. Pt states she would rather taking po iron then have infusions. Informed pt she needs to take Ferrous sulfate 325 mg 1 tab po BID with vit C to increase absorption. Pt states she is currently spotting at present. Pt states she has plenty of po iron at home and does not need RX at this time. Informed will repeat CBC, Iron, Ferritin level in 1 month. ER precautions given if becomes symptomatic.Informed pt Vit D level 19.6. Encouraged Vit D3 2000 IU OTC 1 cap po daily. Will repeat Vit D in 3 months. A1c 6.2. Encouraged ADA diet and exercise. A1c in 3 months. Informed urine test results show blood in urine- pt admits to vaginal spotting at present. Informed XR left knee was normal. Informed will refer to [facility] PT for eval and mgmt.  Pt questioning if she has tendon issues to left knee. Informed pt she will need PT and conservative treatment before medicaid will approve MRI. Pt verbalized understanding.

## 2024-02-22 ENCOUNTER — HOSPITAL ENCOUNTER (OUTPATIENT)
Dept: RADIOLOGY | Facility: HOSPITAL | Age: 34
Discharge: HOME | End: 2024-02-22
Attending: NURSE PRACTITIONER
Payer: MEDICAID

## 2024-02-22 DIAGNOSIS — R55 SYNCOPE AND COLLAPSE: ICD-10-CM

## 2024-02-22 PROCEDURE — 70450 CT HEAD/BRAIN W/O DYE: CPT | Mod: TC

## 2024-03-04 ENCOUNTER — CLINICAL SUPPORT (OUTPATIENT)
Dept: REHABILITATION | Facility: HOSPITAL | Age: 34
End: 2024-03-04
Payer: MEDICAID

## 2024-03-04 DIAGNOSIS — M25.562 CHRONIC PAIN OF LEFT KNEE: Primary | ICD-10-CM

## 2024-03-04 DIAGNOSIS — R29.898 DECREASED STRENGTH INVOLVING KNEE JOINT: ICD-10-CM

## 2024-03-04 DIAGNOSIS — M21.42 BILATERAL PES PLANUS: ICD-10-CM

## 2024-03-04 DIAGNOSIS — M21.41 BILATERAL PES PLANUS: ICD-10-CM

## 2024-03-04 DIAGNOSIS — R29.898 IMPAIRED FLEXIBILITY OF LOWER EXTREMITY: ICD-10-CM

## 2024-03-04 DIAGNOSIS — G89.29 CHRONIC PAIN OF LEFT KNEE: Primary | ICD-10-CM

## 2024-03-04 PROCEDURE — 97162 PT EVAL MOD COMPLEX 30 MIN: CPT

## 2024-03-04 NOTE — PLAN OF CARE
[facility]   Physical Therapy Initial Evaluation      Name: Nikky Roman  Clinic Number: [id number]    Therapy Diagnosis:   Encounter Diagnoses   Name Primary?    Chronic pain of left knee Yes    Impaired flexibility of lower extremity     Decreased strength involving knee joint     Bilateral pes planus         Physician: Marilee Jimenez FNP    Physician Orders: PT Eval and Treat, 2 wk 6  Medical Diagnosis from Referral: M25.562, G 89.29- Chronic L knee pain  Evaluation Date: 3/4/2024  Authorization Period Expiration: TBD  Plan of Care Expiration: TBD  Reassessment / Plan of Care Due: 3/25/2024  Visit # / Visits authorized: 0/      Functional knee FOTO score: 22 (3/4/24)    Precautions: Standard     Time In: 0800  Time Out: 0900  Total Appointment Time (timed & untimed codes): 60 minutes    Subjective     Date of onset / History of current condition - Nikky reports: her Left knee started hurting about 3-4 months ago without specific injury however she did fracture her R ankle in 2021 causing her to be NWB increasing pressure on her Left LE. Left knee pain increases with sit to stands and prolonged sitting, standing, and walking but has no pain without knee movements. She occasionally has cracking and grinding sounds in her knee but no pain. She also suffers with plantar fasciitis in B feet. She takes Meloxicam daily and minimal Methocarbamol.      Falls: 1, stepped in a hole in the yard a while back    Imaging: x-ray of left knee (2/9/24): No acute osseous abnormality     Prior Therapy: none for left knee, but did do PT for LBP and R ankle  Social History:  lives with mother, 3 steps / no rails to enter home  Occupation: not working  Prior Level of Function: modified Independent  Current Level of Function: modified Independent    Pain:  Current 4/10, worst 6/10, best 2/10   Location: left knee  Description: cracking, popping, stiffness, constant  Aggravating Factors: prolonged  "Sitting, Standing, Walking, and steps  Easing Factors: massage    Patients goals: "have knee MRI, decrease knee paini"     Medical History:   Past Medical History:   Diagnosis Date    Chronic low back pain     Dysfunctional uterine bleeding     Iron deficiency anemia, unspecified     Morbid obesity        Surgical History:   Nikky Roman  has a past surgical history that includes Dilation and curettage of uterus (01/2023).    Medications:   Nikky has a current medication list which includes the following prescription(s): albuterol, chlorhexidine, cholecalciferol (vitamin d3), ferosul, ibuprofen, norgestimate-ethinyl estradiol, and ondansetron.    Allergies:   Review of patient's allergies indicates:  No Known Allergies     Objective      L knee ROM: flexion and extension WNL without c/o pain at end range  L L LE strength: 4/5 MMT throughout  Muscle tightness: hamstrings, quadriceps  Tenderness: Left patella tendon    No pain with patella mobs in all planes    Pes planus in B feet affecting ankle and knee alignment as well as WB surface on knee joints    5x sit to stand: 18 secs using B UE  L single leg stance: 2 sec max    Treatment     Total Treatment time (time-based codes) separate from Evaluation: 5 minutes     Nikky received the treatments listed below:      therapeutic exercises to develop strength and flexibility for 5 minutes including:    neuromuscular re-education activities to improve: Balance and Proprioception for 0 minutes. The following activities were included:    Therapeutic Exercise   Therapeutic Exercise Grid     Exercise 1  Exercise 2  Exercise 3  Exercise 4    Exercise :    L LE: hamstring,  quadriceps L LE: gastroc stretch L LE: 4 way hip LAQ with DF  Knee flexion with TB   Repetition/Time :                  Resist or Assist :                  Comment :                Done :                                Exercise 5  Exercise 6  Exercise 7  Exercise 8    Exercise :    Sit to stands   L " step-ups Heel raises  squats Leg press   Repetition/Time :                  Resist or Assist :                  Comment :                  Done :                                  Exercise 9  Exercise 10  Exercise 11  Exercise 12    Exercise :    BOSU   L single leg stance NuStep      Repetition/Time :                  Resist or Assist :                  Comment :                  Done :                                 Exercise 13 Exercise 14  Exercise 15  Exercise 16   Exercise :                  Repetition/Time :                  Resist or Assist :                  Comment :                  Done :                                  Patient Education and Home Exercises     Education provided:   - importance and benefits of performing HEP daily outside of therapy for optimal improvements  - PT to focus on stretching and strengthening muscles that surround knee joint complex  - information provided on arch supports to purchase to place in shoes to improve alignment of ankle and knee joints and WB surface of knee joint  - importance of not walking without shoes due to plantar fasciitis    Written Home Exercises Provided: Patient instructed to cont prior HEP. Exercises were reviewed and Nikky was able to demonstrate them prior to the end of the session.  Nikky demonstrated good understanding of the education provided. See EMR under Patient Instructions for exercises provided during therapy sessions.    Assessment     Nikky is a 33 y.o. female referred to outpatient Physical Therapy with a medical diagnosis of chronic Left knee pain. Patient presents with pain in left knee with changes in position and WB activities, LE muscle tightness and weakness, pes planus in B feet, crepitus in knee joint, and tenderness in patella tendon affecting tolerance with daily activities. Pt would benefit from PT services to address pt's deficits    Patient prognosis is Fair.   Patient will benefit from skilled outpatient Physical  Therapy to address the deficits stated above and in the chart below, provide patient /family education, and to maximize patientt's level of independence.     Plan of care discussed with patient: Yes  Patient's spiritual, cultural and educational needs considered and patient is agreeable to the plan of care and goals as stated below:     Anticipated Barriers for therapy: chronic knee pain, pes planus in feet    Medical Necessity is demonstrated by the following  History  Co-morbidities and personal factors that may impact the plan of care [] LOW: no personal factors / co-morbidities  [x] MODERATE: 1-2 personal factors / co-morbidities  [] HIGH: 3+ personal factors / co-morbidities    Moderate / High Support Documentation:   Co-morbidities affecting plan of care: see above     Examination  Body Structures and Functions, activity limitations and participation restrictions that may impact the plan of care [] LOW: addressing 1-2 elements  [x] MODERATE: 3+ elements  [] HIGH: 4+ elements (please support below)    Moderate / High Support Documentation: see above     Clinical Presentation [] LOW: stable  [] MODERATE: Evolving  [] HIGH: Unstable     Decision Making/ Complexity Score: moderate       Goals:    Short Term Goals: 4 weeks     Pt will demonstrate knowledge and independence with HEP to continue with exercises outside of therapy to facilitate optimal overall improvements    Pt will improve functional score on knee FOTO by >10 points which indicates improved ability to perform daily tasks with less difficulty and pain    Reduce c/o pain in left knee to < 2/10 pain level with daily activities    Improve strength in L LE to 4+/5 MMT throughout in order to improve tolerance with overall functional mobility    Pt will purchase arch supports for shoes to improve foot and knee alignment to reduce knee pain    Long Term Goals: 6 weeks     Pt will improve functional score on knee FOTO by >20 points which indicates improved  ability to perform daily tasks with less difficulty and pain    Pt will improve L LE proprioception input to improve balance as evidence by improved Single Leg Stance to >30 secs    Pt will reduce risk for falls and improve ability and safety with transfers as evidence by improved 5x sit to stands to < 12 secs    Improve flexibility of B LE in order to reduce mechanical stressors on the knee joint complex    Improve strength in L LE to 5/5 MMT throughout in order to improve tolerance with overall functional mobility    Plan     Plan of care Certification: TBD.    Outpatient Physical Therapy 2 times weekly for 6 weeks to include the following interventions: Neuromuscular Re-ed, Patient Education, Therapeutic Exercise, and pain management .     Kayla Marcum, PT       I CERTIFY THE NEED FOR THESE SERVICES FURNISHED UNDER THIS PLAN OF TREATMENT AND WHILE UNDER MY CARE   Physician's comments:      Physician's Signature: ___________________________________________________

## 2024-03-06 ENCOUNTER — HOSPITAL ENCOUNTER (OUTPATIENT)
Dept: CARDIOLOGY | Facility: HOSPITAL | Age: 34
Discharge: HOME | End: 2024-03-06
Attending: NURSE PRACTITIONER
Payer: MEDICAID

## 2024-03-06 DIAGNOSIS — G89.29 CHRONIC PAIN OF LEFT KNEE: Primary | ICD-10-CM

## 2024-03-06 DIAGNOSIS — R55 SYNCOPE, UNSPECIFIED SYNCOPE TYPE: ICD-10-CM

## 2024-03-06 DIAGNOSIS — M25.562 CHRONIC PAIN OF LEFT KNEE: Primary | ICD-10-CM

## 2024-03-06 PROCEDURE — 93225 XTRNL ECG REC<48 HRS REC: CPT

## 2024-03-08 ENCOUNTER — LAB VISIT (OUTPATIENT)
Dept: LAB | Facility: HOSPITAL | Age: 34
End: 2024-03-08
Attending: NURSE PRACTITIONER
Payer: MEDICAID

## 2024-03-08 DIAGNOSIS — D50.0 IRON DEFICIENCY ANEMIA DUE TO CHRONIC BLOOD LOSS: ICD-10-CM

## 2024-03-08 DIAGNOSIS — R73.03 PREDIABETES: ICD-10-CM

## 2024-03-08 DIAGNOSIS — E55.9 VITAMIN D DEFICIENCY: ICD-10-CM

## 2024-03-08 LAB
ANISOCYTOSIS BLD QL SMEAR: ABNORMAL
BASOPHILS # BLD AUTO: 0.03 X10(3)/MCL
BASOPHILS NFR BLD AUTO: 0.4 %
DEPRECATED CALCIDIOL+CALCIFEROL SERPL-MC: 14.9 NG/ML (ref 30–80)
EOSINOPHIL # BLD AUTO: 0.18 X10(3)/MCL (ref 0–0.9)
EOSINOPHIL NFR BLD AUTO: 2.1 %
ERYTHROCYTE [DISTWIDTH] IN BLOOD BY AUTOMATED COUNT: 26.2 % (ref 11.5–17)
EST. AVERAGE GLUCOSE BLD GHB EST-MCNC: 105.4 MG/DL
FERRITIN SERPL-MCNC: 33.61 NG/ML (ref 4.63–204)
HBA1C MFR BLD: 5.3 %
HCT VFR BLD AUTO: 31.4 % (ref 37–47)
HGB BLD-MCNC: 8.8 G/DL (ref 12–16)
IMM GRANULOCYTES # BLD AUTO: 0.01 X10(3)/MCL (ref 0–0.04)
IMM GRANULOCYTES NFR BLD AUTO: 0.1 %
IRON SATN MFR SERPL: 83 % (ref 20–50)
IRON SERPL-MCNC: 284 UG/DL (ref 50–170)
LYMPHOCYTES # BLD AUTO: 2.2 X10(3)/MCL (ref 0.6–4.6)
LYMPHOCYTES NFR BLD AUTO: 25.9 %
MCH RBC QN AUTO: 22.7 PG (ref 27–31)
MCHC RBC AUTO-ENTMCNC: 28 G/DL (ref 33–36)
MCV RBC AUTO: 80.9 FL (ref 80–94)
MICROCYTES BLD QL SMEAR: ABNORMAL
MONOCYTES # BLD AUTO: 0.4 X10(3)/MCL (ref 0.1–1.3)
MONOCYTES NFR BLD AUTO: 4.7 %
NEUTROPHILS # BLD AUTO: 5.69 X10(3)/MCL (ref 2.1–9.2)
NEUTROPHILS NFR BLD AUTO: 66.8 %
NRBC BLD AUTO-RTO: 0 %
OVALOCYTES (OLG): SLIGHT
PLATELET # BLD AUTO: 291 X10(3)/MCL (ref 130–400)
PLATELET # BLD EST: ADEQUATE 10*3/UL
PMV BLD AUTO: 11.2 FL (ref 7.4–10.4)
POIKILOCYTOSIS BLD QL SMEAR: ABNORMAL
RBC # BLD AUTO: 3.88 X10(6)/MCL (ref 4.2–5.4)
RBC MORPH BLD: ABNORMAL
TIBC SERPL-MCNC: 341 UG/DL (ref 250–450)
TIBC SERPL-MCNC: 57 UG/DL (ref 70–310)
TRANSFERRIN SERPL-MCNC: 283 MG/DL (ref 180–382)
WBC # SPEC AUTO: 8.51 X10(3)/MCL (ref 4.5–11.5)

## 2024-03-08 PROCEDURE — 83036 HEMOGLOBIN GLYCOSYLATED A1C: CPT

## 2024-03-08 PROCEDURE — 82728 ASSAY OF FERRITIN: CPT

## 2024-03-08 PROCEDURE — 85025 COMPLETE CBC W/AUTO DIFF WBC: CPT

## 2024-03-08 PROCEDURE — 82306 VITAMIN D 25 HYDROXY: CPT

## 2024-03-08 PROCEDURE — 36415 COLL VENOUS BLD VENIPUNCTURE: CPT

## 2024-03-08 PROCEDURE — 83540 ASSAY OF IRON: CPT

## 2024-03-11 ENCOUNTER — CLINICAL SUPPORT (OUTPATIENT)
Dept: REHABILITATION | Facility: HOSPITAL | Age: 34
End: 2024-03-11
Payer: MEDICAID

## 2024-03-11 DIAGNOSIS — R29.898 DECREASED STRENGTH INVOLVING KNEE JOINT: ICD-10-CM

## 2024-03-11 DIAGNOSIS — M25.562 CHRONIC PAIN OF LEFT KNEE: Primary | ICD-10-CM

## 2024-03-11 DIAGNOSIS — R29.898 IMPAIRED FLEXIBILITY OF LOWER EXTREMITY: ICD-10-CM

## 2024-03-11 DIAGNOSIS — M21.41 BILATERAL PES PLANUS: ICD-10-CM

## 2024-03-11 DIAGNOSIS — G89.29 CHRONIC PAIN OF LEFT KNEE: Primary | ICD-10-CM

## 2024-03-11 DIAGNOSIS — M21.42 BILATERAL PES PLANUS: ICD-10-CM

## 2024-03-11 LAB
OHS CV EVENT MONITOR DAY: 0
OHS CV HOLTER LENGTH DECIMAL HOURS: 48
OHS CV HOLTER LENGTH HOURS: 48
OHS CV HOLTER LENGTH MINUTES: 0
OHS CV HOLTER SINUS AVERAGE HR: 74
OHS CV HOLTER SINUS MAX HR: 121
OHS CV HOLTER SINUS MIN HR: 51

## 2024-03-11 PROCEDURE — 97110 THERAPEUTIC EXERCISES: CPT | Mod: CQ

## 2024-03-11 NOTE — PROGRESS NOTES
[facility]   Physical Therapy Treatment Note      Name: Nikky Roman  Clinic Number: [id number]    Therapy Diagnosis: No diagnosis found.  Physician: Marilee Jimenez FNP    Visit Date: 3/11/2024    Physician: Marilee Jimenez FNP     Physician Orders: PT Eval and Treat, 2 wk 6  Medical Diagnosis from Referral: M25.562, G 89.29- Chronic L knee pain  Evaluation Date: 3/4/2024  Authorization Period Expiration: 5/29/24  Plan of Care Expiration: 5/29/24  Reassessment / Plan of Care Due: 3/25/2024  Visit # / Visits authorized: 1/8     Functional knee FOTO score: 22 (3/4/24)     Precautions: Standard      Time In: 1138  Time Out: 1203  Total Appointment Time (timed & untimed codes): 25 minutes       PTA Visit #: 1/5       Subjective     Patient reports: her right knee is starting to hurt more.  She was compliant with home exercise program.  Response to previous treatment: sore in medial L knee  Functional change: minimal thus far    Pain: 3/10  Location: bilateral knees (L>R)     Objective      (-) muscle tightness in L HS  (+) muscle tightness in L quad and gastroc    Treatment     Nikky received the treatments listed below:      therapeutic exercises to develop strength and ROM for 20 minutes including:      manual therapy techniques: Joint mobilizations were applied to the: L knee for 5 minutes, including:    Therapeutic Exercise Grid     Exercise 1  Exercise 2  Exercise 3  Exercise 4    Exercise :    L LE: hamstring,  quadriceps L LE: gastroc stretch L LE: 4 way hip LAQ with DF  Knee flexion with TB   Repetition/Time :    HS: 60s  Quad: 3 x 30s   3 x 30s   10 each   2x10, 3 sec   Resist or Assist :             Red TB     Comment :                 Done :    yes  yes   yes   yes                      Exercise 5  Exercise 6  Exercise 7  Exercise 8    Exercise :    Sit to stands   L step-ups Heel raises  squats Leg press   Repetition/Time :       15   15        Resist or Assist :                   Comment :       4 inch step   Heel raises only        Done :       yes   yes                         Exercise 9  Exercise 10  Exercise 11  Exercise 12    Exercise :    BOSU   L single leg stance NuStep      Repetition/Time :       41 sec max           Resist or Assist :                  Comment :                  Done :       yes                            Exercise 13 Exercise 14  Exercise 15  Exercise 16   Exercise :                  Repetition/Time :                  Resist or Assist :                  Comment :                  Done :                                   Patient Education and Home Exercises       Education provided:   - importance and benefits of performing HEP daily outside of therapy for optimal improvements  - PT to focus on stretching and strengthening muscles that surround knee joint complex  - information provided on arch supports to purchase to place in shoes to improve alignment of ankle and knee joints and WB surface of knee joint  - importance of not walking without shoes due to plantar fasciitis     Written Home Exercises Provided: Patient instructed to cont prior HEP. Exercises were reviewed and Nikky was able to demonstrate them prior to the end of the session.  Nikky demonstrated good understanding of the education provided. See EMR under Patient Instructions for exercises provided during therapy sessions.    Assessment     Nikky demonstrated (+) outcome after today's session and good initiation of exercise plan. She required minimal verbal cues for proper execution. Added posterior glide and distraction of tibia to reduce pain and improve ROM to the left knee with no report of additional pain. She is making a fair progress toward goals as evidence by increasing repetitions and improve L knee stability/proprioception during SLS. Nikky continues to demo deficits with standing tolerance and pain with L knee extension. Recommend cryotherapy as needed and to continue  HEP to maximize functional mobility.     Nikky Is progressing well towards her goals.   Patient prognosis is Fair.   Patient will benefit from skilled outpatient Physical Therapy to address the deficits stated above and in the chart below, provide patient /family education, and to maximize patientt's level of independence.      Plan of care discussed with patient: Yes  Patient's spiritual, cultural and educational needs considered and patient is agreeable to the plan of care and goals as stated below:      Anticipated Barriers for therapy: chronic knee pain, pes planus in feet    Goals:     Short Term Goals: 4 weeks      Pt will demonstrate knowledge and independence with HEP to continue with exercises outside of therapy to facilitate optimal overall improvements     Pt will improve functional score on knee FOTO by >10 points which indicates improved ability to perform daily tasks with less difficulty and pain     Reduce c/o pain in left knee to < 2/10 pain level with daily activities     Improve strength in L LE to 4+/5 MMT throughout in order to improve tolerance with overall functional mobility     Pt will purchase arch supports for shoes to improve foot and knee alignment to reduce knee pain     Long Term Goals: 6 weeks      Pt will improve functional score on knee FOTO by >20 points which indicates improved ability to perform daily tasks with less difficulty and pain     Pt will improve L LE proprioception input to improve balance as evidence by improved Single Leg Stance to >30 secs     Pt will reduce risk for falls and improve ability and safety with transfers as evidence by improved 5x sit to stands to < 12 secs     Improve flexibility of B LE in order to reduce mechanical stressors on the knee joint complex     Improve strength in L LE to 5/5 MMT throughout in order to improve tolerance with overall functional mobility    Plan     Plan of care Certification: 5/10/24.     Outpatient Physical Therapy 2  times weekly for 6 weeks to include the following interventions: Neuromuscular Re-ed, Patient Education, Therapeutic Exercise, and pain management .        Jenny Mills, KULDEEP

## 2024-03-13 ENCOUNTER — CLINICAL SUPPORT (OUTPATIENT)
Dept: REHABILITATION | Facility: HOSPITAL | Age: 34
End: 2024-03-13
Payer: MEDICAID

## 2024-03-13 DIAGNOSIS — M21.42 BILATERAL PES PLANUS: ICD-10-CM

## 2024-03-13 DIAGNOSIS — M21.41 BILATERAL PES PLANUS: ICD-10-CM

## 2024-03-13 DIAGNOSIS — M25.562 CHRONIC PAIN OF LEFT KNEE: Primary | ICD-10-CM

## 2024-03-13 DIAGNOSIS — R29.898 IMPAIRED FLEXIBILITY OF LOWER EXTREMITY: ICD-10-CM

## 2024-03-13 DIAGNOSIS — G89.29 CHRONIC PAIN OF LEFT KNEE: Primary | ICD-10-CM

## 2024-03-13 DIAGNOSIS — R29.898 DECREASED STRENGTH INVOLVING KNEE JOINT: ICD-10-CM

## 2024-03-13 PROCEDURE — 97110 THERAPEUTIC EXERCISES: CPT | Mod: CQ

## 2024-03-13 NOTE — PROGRESS NOTES
[facility]   Physical Therapy Treatment Note      Name: Nikky Roman  Clinic Number: [id number]    Therapy Diagnosis:   Encounter Diagnoses   Name Primary?    Chronic pain of left knee Yes    Impaired flexibility of lower extremity     Decreased strength involving knee joint     Bilateral pes planus      Physician: Marilee Jimenez FNP    Visit Date: 3/13/2024    Physician: Marilee Jimenez FNP     Physician Orders: PT Eval and Treat, 2 wk 6  Medical Diagnosis from Referral: M25.562, G 89.29- Chronic L knee pain  Evaluation Date: 3/4/2024  Authorization Period Expiration: 5/29/24  Plan of Care Expiration: 5/29/24  Reassessment / Plan of Care Due: 3/25/2024  Visit # / Visits authorized: 2/8     Functional knee FOTO score: 22 (3/4/24)     Precautions: Standard      Time In: 1134  Time Out: 1202  Total Appointment Time (timed & untimed codes): 28 minutes       PTA Visit #: 2/5       Subjective     Patient reports: she has no knee pain today.   She was compliant with home exercise program.  Response to previous treatment: sore in medial L knee  Functional change: minimal thus far    Pain: 0/10  Location: bilateral knees (L>R)     Objective      (-) muscle tightness in L HS  (+) muscle tightness in L quad and gastroc    Treatment     Nikky received the treatments listed below:      therapeutic exercises to develop strength and ROM for 28 minutes including:      Therapeutic Exercise Grid     Exercise 1  Exercise 2  Exercise 3  Exercise 4    Exercise :    L LE: hamstring,  quadriceps L LE: gastroc stretch L LE: 4 way hip LAQ with DF  Knee flexion with TB   Repetition/Time :    HS: 60s  Quad: 3 x 30s   3 x 30s   10 each   2x10, 3 sec   Resist or Assist :             Red TB     Comment :                 Done :    yes  yes   no   yes                      Exercise 5  Exercise 6  Exercise 7  Exercise 8    Exercise :    Sit to stands   L step-ups Heel raises  squats Leg press   Repetition/Time  :       15   15        Resist or Assist :                  Comment :       6 inch step   Heel raises only        Done :       yes   yes                         Exercise 9  Exercise 10  Exercise 11  Exercise 12    Exercise :    BOSU   L single leg stance NuStep      Repetition/Time :       53 sec max   5 min        Resist or Assist :          L4, Seat 6        Comment :                  Done :       yes   yes                         Exercise 13 Exercise 14  Exercise 15  Exercise 16   Exercise :                  Repetition/Time :                  Resist or Assist :                  Comment :                  Done :                                   Patient Education and Home Exercises       Education provided:   - importance and benefits of performing HEP daily outside of therapy for optimal improvements  - PT to focus on stretching and strengthening muscles that surround knee joint complex  - information provided on arch supports to purchase to place in shoes to improve alignment of ankle and knee joints and WB surface of knee joint  - importance of not walking without shoes due to plantar fasciitis     Written Home Exercises Provided: Patient instructed to cont prior HEP. Exercises were reviewed and Nikky was able to demonstrate them prior to the end of the session.  Nikky demonstrated good understanding of the education provided. See EMR under Patient Instructions for exercises provided during therapy sessions.    Assessment     Nikky demonstrated (+) outcome during today's session, with increases made in SLS hold time, and no issues with initiation of NuStep. She required minimal verbal cues for proper execution. She is making a fair progress toward goals as evidence by increasing repetitions and improve L knee stability/proprioception during SLS. Nikky continues to demo deficits with standing tolerance and pain with L knee extension.     Nikky Is progressing well towards her goals.   Patient  prognosis is Fair.   Patient will benefit from skilled outpatient Physical Therapy to address the deficits stated above and in the chart below, provide patient /family education, and to maximize patientt's level of independence.      Plan of care discussed with patient: Yes  Patient's spiritual, cultural and educational needs considered and patient is agreeable to the plan of care and goals as stated below:      Anticipated Barriers for therapy: chronic knee pain, pes planus in feet    Goals:     Short Term Goals: 4 weeks      Pt will demonstrate knowledge and independence with HEP to continue with exercises outside of therapy to facilitate optimal overall improvements     Pt will improve functional score on knee FOTO by >10 points which indicates improved ability to perform daily tasks with less difficulty and pain     Reduce c/o pain in left knee to < 2/10 pain level with daily activities     Improve strength in L LE to 4+/5 MMT throughout in order to improve tolerance with overall functional mobility     Pt will purchase arch supports for shoes to improve foot and knee alignment to reduce knee pain     Long Term Goals: 6 weeks      Pt will improve functional score on knee FOTO by >20 points which indicates improved ability to perform daily tasks with less difficulty and pain     Pt will improve L LE proprioception input to improve balance as evidence by improved Single Leg Stance to >30 secs     Pt will reduce risk for falls and improve ability and safety with transfers as evidence by improved 5x sit to stands to < 12 secs     Improve flexibility of B LE in order to reduce mechanical stressors on the knee joint complex     Improve strength in L LE to 5/5 MMT throughout in order to improve tolerance with overall functional mobility    Plan     Plan of care Certification: 5/10/24.     Outpatient Physical Therapy 2 times weekly for 6 weeks to include the following interventions: Neuromuscular Re-ed, Patient  Education, Therapeutic Exercise, and pain management .        David Baird, PTA

## 2024-03-14 ENCOUNTER — OFFICE VISIT (OUTPATIENT)
Dept: INTERNAL MEDICINE | Facility: CLINIC | Age: 34
End: 2024-03-14
Payer: MEDICAID

## 2024-03-14 ENCOUNTER — LAB VISIT (OUTPATIENT)
Dept: LAB | Facility: HOSPITAL | Age: 34
End: 2024-03-14
Attending: NURSE PRACTITIONER
Payer: MEDICAID

## 2024-03-14 VITALS
HEIGHT: 60 IN | HEART RATE: 60 BPM | WEIGHT: 255 LBS | TEMPERATURE: 98 F | SYSTOLIC BLOOD PRESSURE: 130 MMHG | DIASTOLIC BLOOD PRESSURE: 85 MMHG | BODY MASS INDEX: 50.06 KG/M2 | RESPIRATION RATE: 18 BRPM

## 2024-03-14 DIAGNOSIS — G89.29 CHRONIC PAIN OF RIGHT ANKLE: ICD-10-CM

## 2024-03-14 DIAGNOSIS — N93.9 ABNORMAL UTERINE BLEEDING (AUB): ICD-10-CM

## 2024-03-14 DIAGNOSIS — D50.0 IRON DEFICIENCY ANEMIA DUE TO CHRONIC BLOOD LOSS: ICD-10-CM

## 2024-03-14 DIAGNOSIS — G89.29 CHRONIC BILATERAL LOW BACK PAIN, UNSPECIFIED WHETHER SCIATICA PRESENT: ICD-10-CM

## 2024-03-14 DIAGNOSIS — M25.562 CHRONIC PAIN OF LEFT KNEE: ICD-10-CM

## 2024-03-14 DIAGNOSIS — R51.9 NONINTRACTABLE HEADACHE, UNSPECIFIED CHRONICITY PATTERN, UNSPECIFIED HEADACHE TYPE: Primary | ICD-10-CM

## 2024-03-14 DIAGNOSIS — Z00.00 WELL ADULT EXAM: ICD-10-CM

## 2024-03-14 DIAGNOSIS — Z83.2 FAMILY HISTORY OF SICKLE CELL ANEMIA: ICD-10-CM

## 2024-03-14 DIAGNOSIS — G89.29 CHRONIC PAIN OF LEFT KNEE: ICD-10-CM

## 2024-03-14 DIAGNOSIS — M25.571 CHRONIC PAIN OF RIGHT ANKLE: ICD-10-CM

## 2024-03-14 DIAGNOSIS — Z13.6 SCREENING FOR HYPERTENSION: ICD-10-CM

## 2024-03-14 DIAGNOSIS — M54.50 CHRONIC BILATERAL LOW BACK PAIN, UNSPECIFIED WHETHER SCIATICA PRESENT: ICD-10-CM

## 2024-03-14 DIAGNOSIS — R31.9 HEMATURIA, UNSPECIFIED TYPE: ICD-10-CM

## 2024-03-14 LAB
ANISOCYTOSIS BLD QL SMEAR: ABNORMAL
BASOPHILS # BLD AUTO: 0.04 X10(3)/MCL
BASOPHILS NFR BLD AUTO: 0.6 %
EOSINOPHIL # BLD AUTO: 0.12 X10(3)/MCL (ref 0–0.9)
EOSINOPHIL NFR BLD AUTO: 1.8 %
ERYTHROCYTE [DISTWIDTH] IN BLOOD BY AUTOMATED COUNT: 24.5 % (ref 11.5–17)
HCT VFR BLD AUTO: 34.9 % (ref 37–47)
HGB BLD-MCNC: 9.8 G/DL (ref 12–16)
HYPOCHROMIA BLD QL SMEAR: ABNORMAL
IGA SERPL-MCNC: 264 MG/DL (ref 65–421)
IGG SERPL-MCNC: 1087 MG/DL (ref 522–1631)
IGM SERPL-MCNC: 97 MG/DL (ref 33–293)
IMM GRANULOCYTES # BLD AUTO: 0.02 X10(3)/MCL (ref 0–0.04)
IMM GRANULOCYTES NFR BLD AUTO: 0.3 %
IRON SATN MFR SERPL: 9 % (ref 20–50)
IRON SERPL-MCNC: 29 UG/DL (ref 50–170)
LYMPHOCYTES # BLD AUTO: 1.79 X10(3)/MCL (ref 0.6–4.6)
LYMPHOCYTES NFR BLD AUTO: 26.6 %
MCH RBC QN AUTO: 22.7 PG (ref 27–31)
MCHC RBC AUTO-ENTMCNC: 28.1 G/DL (ref 33–36)
MCV RBC AUTO: 81 FL (ref 80–94)
MONOCYTES # BLD AUTO: 0.29 X10(3)/MCL (ref 0.1–1.3)
MONOCYTES NFR BLD AUTO: 4.3 %
NEUTROPHILS # BLD AUTO: 4.46 X10(3)/MCL (ref 2.1–9.2)
NEUTROPHILS NFR BLD AUTO: 66.4 %
NRBC BLD AUTO-RTO: 0 %
OVALOCYTES (OLG): ABNORMAL
PLATELET # BLD AUTO: 252 X10(3)/MCL (ref 130–400)
PLATELET # BLD EST: ADEQUATE 10*3/UL
PMV BLD AUTO: 10.7 FL (ref 7.4–10.4)
POLYCHROMASIA BLD QL SMEAR: ABNORMAL
RBC # BLD AUTO: 4.31 X10(6)/MCL (ref 4.2–5.4)
RBC MORPH BLD: ABNORMAL
TIBC SERPL-MCNC: 286 UG/DL (ref 70–310)
TIBC SERPL-MCNC: 315 UG/DL (ref 250–450)
TRANSFERRIN SERPL-MCNC: 302 MG/DL (ref 180–382)
WBC # SPEC AUTO: 6.72 X10(3)/MCL (ref 4.5–11.5)

## 2024-03-14 PROCEDURE — 3079F DIAST BP 80-89 MM HG: CPT | Mod: CPTII,,, | Performed by: NURSE PRACTITIONER

## 2024-03-14 PROCEDURE — 99214 OFFICE O/P EST MOD 30 MIN: CPT | Mod: PBBFAC | Performed by: NURSE PRACTITIONER

## 2024-03-14 PROCEDURE — 99213 OFFICE O/P EST LOW 20 MIN: CPT | Mod: S$PBB,,, | Performed by: NURSE PRACTITIONER

## 2024-03-14 PROCEDURE — 3075F SYST BP GE 130 - 139MM HG: CPT | Mod: CPTII,,, | Performed by: NURSE PRACTITIONER

## 2024-03-14 PROCEDURE — 1159F MED LIST DOCD IN RCRD: CPT | Mod: CPTII,,, | Performed by: NURSE PRACTITIONER

## 2024-03-14 PROCEDURE — 83521 IG LIGHT CHAINS FREE EACH: CPT

## 2024-03-14 PROCEDURE — 85025 COMPLETE CBC W/AUTO DIFF WBC: CPT

## 2024-03-14 PROCEDURE — 3044F HG A1C LEVEL LT 7.0%: CPT | Mod: CPTII,,, | Performed by: NURSE PRACTITIONER

## 2024-03-14 PROCEDURE — 36415 COLL VENOUS BLD VENIPUNCTURE: CPT

## 2024-03-14 PROCEDURE — 82784 ASSAY IGA/IGD/IGG/IGM EACH: CPT

## 2024-03-14 PROCEDURE — 83540 ASSAY OF IRON: CPT

## 2024-03-14 PROCEDURE — 84165 PROTEIN E-PHORESIS SERUM: CPT

## 2024-03-14 PROCEDURE — 1160F RVW MEDS BY RX/DR IN RCRD: CPT | Mod: CPTII,,, | Performed by: NURSE PRACTITIONER

## 2024-03-14 PROCEDURE — 3008F BODY MASS INDEX DOCD: CPT | Mod: CPTII,,, | Performed by: NURSE PRACTITIONER

## 2024-03-14 RX ORDER — MELOXICAM 15 MG/1
15 TABLET ORAL
COMMUNITY
Start: 2024-02-20 | End: 2024-05-24

## 2024-03-14 NOTE — PROGRESS NOTES
Patient ID: [id number]     Chief Complaint: LAB RESULTS        HPI:     HPI      Nikky Roman is a 33 y.o. female here today for a follow up. Pt states she stopped taking iron supplement after last labs showing elevated iron level. Will get repeat CBC and iron profile today.         Immunizations:   Immunization History   Administered Date(s) Administered    DTP 1989, 1989, 02/06/1990, 08/21/1990, 02/25/1994    HIB 08/21/1990    Hepatitis B 08/03/2001, 09/07/2001    Hepatitis B, Pediatric/Adolescent 08/07/2003    MMR 08/21/1990, 02/25/1994    OPV 1989, 1989, 08/21/1990, 02/25/1994    Td (ADULT) 09/07/2001        ----------------------------  Chronic low back pain  Dysfunctional uterine bleeding  Iron deficiency anemia, unspecified  Morbid obesity     Past Surgical History:   Procedure Laterality Date    DILATION AND CURETTAGE OF UTERUS  01/2023       Review of patient's allergies indicates:  No Known Allergies    Current Outpatient Medications   Medication Instructions    albuterol (PROAIR HFA) 90 mcg/actuation inhaler 2 puffs, Inhalation, Every 6 hours PRN, Rescue    chlorhexidine (PERIDEX) 0.12 % solution SMARTSIG:By Mouth    cholecalciferol (vitamin D3) (VITAMIN D3) 2,000 Units, Oral, Daily    FEROSUL 325 mg (65 mg iron) Tab tablet Oral, 2 times daily    ibuprofen (ADVIL,MOTRIN) 800 mg, Oral, 3 times daily PRN    meloxicam (MOBIC) 15 mg, Oral    norgestimate-ethinyl estradioL (ORTHO-CYCLEN) 0.25-35 mg-mcg per tablet 1 tablet, Oral, Daily    ondansetron (ZOFRAN) 4 mg, Oral, Every 6 hours       Social History     Socioeconomic History    Marital status: Single    Number of children: 0   Tobacco Use    Smoking status: Never     Passive exposure: Current    Smokeless tobacco: Never   Substance and Sexual Activity    Alcohol use: Not Currently     Alcohol/week: 2.0 standard drinks of alcohol     Types: 2 Cans of beer per week    Drug use: Never    Sexual activity: Yes      Partners: Male     Social Determinants of Health     Financial Resource Strain: High Risk (4/3/2023)    Overall Financial Resource Strain (CARDIA)     Difficulty of Paying Living Expenses: Very hard   Food Insecurity: No Food Insecurity (4/3/2023)    Hunger Vital Sign     Worried About Running Out of Food in the Last Year: Never true     Ran Out of Food in the Last Year: Never true   Transportation Needs: Unmet Transportation Needs (4/3/2023)    PRAPARE - Transportation     Lack of Transportation (Medical): Yes     Lack of Transportation (Non-Medical): Yes   Physical Activity: Inactive (4/3/2023)    Exercise Vital Sign     Days of Exercise per Week: 0 days     Minutes of Exercise per Session: 0 min   Stress: No Stress Concern Present (4/3/2023)    [employer] - Occupational Stress Questionnaire     Feeling of Stress : Not at all   Social Connections: Moderately Isolated (4/3/2023)    Social Connection and Isolation Panel [NHANES]     Frequency of Communication with Friends and Family: More than three times a week     Frequency of Social Gatherings with Friends and Family: Twice a week     Attends Zoroastrianism Services: More than 4 times per year     Active Member of Clubs or Organizations: No     Attends Club or Organization Meetings: Never     Marital Status: Never    Housing Stability: High Risk (4/3/2023)    Housing Stability Vital Sign     Unable to Pay for Housing in the Last Year: Yes     Number of Places Lived in the Last Year: 1     Unstable Housing in the Last Year: No        Family History   Problem Relation Age of Onset    Diabetes Father     Hypertension Father     Diabetes Maternal Grandmother     Hypertension Maternal Grandmother     Diabetes Paternal Grandmother     Hypertension Paternal Grandmother         Patient Care Team:  Marilee Jimenez FNP as PCP - General (Family Medicine)  Margaret Hansen LPN as Care Coordinator     Subjective:      Review of Systems     See HPI for details    Constitutional: Denies Change in appetite. Denies Chills. Denies Fever. Denies Night sweats.  Eye: Denies Blurred vision. Denies Discharge. Denies Eye pain.  ENT: Denies Decreased hearing. Denies Sore throat. Denies Swollen glands.  Respiratory: Denies Cough. Denies Shortness of breath. Denies Shortness of breath with exertion. Denies Wheezing.  Cardiovascular: DeniesChest pain at rest. Denies Chest pain with exertion. Denies Irregular heartbeat. Denies Palpitations. Denies Edema.  Gastrointestinal: Denies Abdominal pain. DeniesDiarrhea. Denies Nausea. Denies Vomiting. Denies Hematemesis or Hematochezia.  Genitourinary: Denies Dysuria. Denies Urinary frequency. Denies Urinary urgency. Denies Blood in urine.  Endocrine: Denies Cold intolerance. Denies Excessive thirst. Denies Heat intolerance. Denies Weight loss. Denies Weight gain.  Musculoskeletal: Denies Painful joints. Denies Weakness.  Integumentary: Denies Rash. Denies Itching. Denies Dry skin.  Neurologic: Denies Dizziness. Denies Fainting. Denies Headache.  Psychiatric: Denies Depression. Denies Anxiety. Denies Suicidal/Homicidal ideations.    All Other ROS: Negative except as stated in HPI.       Objective:     Visit Vitals  /85 (BP Location: Right arm, Patient Position: Sitting, BP Method: Large (Automatic))   Pulse 60   Temp 98.1 °F (36.7 °C) (Oral)   Resp 18   Ht 5' (1.524 m)   Wt 115.7 kg (255 lb)   BMI 49.80 kg/m²       Physical Exam    General: Alert and oriented, No acute distress.  Head: Normocephalic, Atraumatic.  Eye: Pupils are equal, round and reactive to light, Extraocular movements are intact, Sclera non-icteric. Mucous membranes slight pale and moist.   Ears/Nose/Throat: Normal, Mucosa moist,Clear.  Neck/Thyroid: Supple, Non-tender, No carotid bruit, No lymphadenopathy, No JVD, Full range of motion.  Respiratory: Clear to auscultation bilaterally; No wheezes, rales or rhonchi,Non-labored  respirations, Symmetrical chest wall expansion.  Cardiovascular: Regular rate and rhythm, S1/S2 normal, No murmurs, rubs or gallops.  Gastrointestinal: Soft, Non-tender, Non-distended, Normal bowel sounds, No palpable organomegaly.  Musculoskeletal: Normal range of motion.  Integumentary: Warm, Dry, Intact, No suspicious lesions or rashes.  Extremities: No clubbing, cyanosis or edema  Neurologic: No focal deficits, Cranial Nerves II-XII are grossly intact, Motor strength normal upper and lower extremities, Sensory exam intact.  Psychiatric: Normal interaction, Coherent speech, Euthymic mood, Appropriate affect       Labs Reviewed:     Chemistry:  Lab Results   Component Value Date     02/09/2024    K 4.3 02/09/2024    CHLORIDE 110 (H) 02/09/2024    BUN 13.3 02/09/2024    CREATININE 0.92 02/09/2024    EGFRNORACEVR >60 02/09/2024    GLUCOSE 137 (H) 02/09/2024    CALCIUM 9.1 02/09/2024    ALKPHOS 90 10/24/2023    LABPROT 7.2 10/24/2023    ALBUMIN 3.6 10/24/2023    BILIDIR 0.2 12/07/2021    IBILI 0.20 12/07/2021    AST 27 10/24/2023    ALT 25 10/24/2023    MG 2.0 07/24/2017    TLODVCQS05OU 14.9 (L) 03/08/2024        Lab Results   Component Value Date    HGBA1C 5.3 03/08/2024        Hematology:  Lab Results   Component Value Date    WBC 8.51 03/08/2024    HGB 8.8 (L) 03/08/2024    HCT 31.4 (L) 03/08/2024     03/08/2024       Lipid Panel:  Lab Results   Component Value Date    CHOL 125 02/09/2024    HDL 38 02/09/2024    LDL 69.00 02/09/2024    TRIG 91 02/09/2024    TOTALCHOLEST 3 02/09/2024        Urine:  Lab Results   Component Value Date    COLORUA Yellow 02/09/2024    APPEARANCEUA Turbid (A) 02/09/2024    SGUA 1.027 02/09/2024    PHUA 5.5 02/09/2024    PROTEINUA 1+ (A) 02/09/2024    GLUCOSEUA Normal 02/09/2024    KETONESUA Negative 02/09/2024    BLOODUA 3+ (A) 02/09/2024    NITRITESUA Negative 02/09/2024    LEUKOCYTESUR Negative 02/09/2024    RBCUA 0-5 02/09/2024    WBCUA 0-5 02/09/2024    BACTERIA Trace  (A) 02/09/2024    SQEPUA Many (A) 02/09/2024    HYALINECASTS None Seen 02/09/2024        Assessment:       ICD-10-CM ICD-9-CM   1. Nonintractable headache, unspecified chronicity pattern, unspecified headache type  R51.9 784.0   2. Abnormal uterine bleeding (AUB)  N93.9 626.9   3. Iron deficiency anemia due to chronic blood loss  D50.0 280.0   4. Chronic pain of right ankle  M25.571 719.47    G89.29 338.29   5. Chronic bilateral low back pain, unspecified whether sciatica present  M54.50 724.2    G89.29 338.29   6. Screening for hypertension  Z13.6 V81.1   7. Well adult exam  Z00.00 V70.0   8. Chronic pain of left knee  M25.562 719.46    G89.29 338.29   9. Hematuria, unspecified type  R31.9 599.70        Plan:     1. Nonintractable headache, unspecified chronicity pattern, unspecified headache type  CT head done 2-22-24 showing:  CT Head Without Contrast  Order: [id number]  Status: Final result       Visible to patient: Yes (seen)       Next appt: None       Dx: Syncope and collapse    0 Result Notes  Details    Reading Physician Reading Date Result Priority   Som Bernal MD  [phone] 2/22/2024 Routine     Narrative & Impression  EXAMINATION:  CT HEAD WITHOUT CONTRAST     CLINICAL HISTORY:  Syncope, recurrent; Syncope and collapse     TECHNIQUE:  Low dose axial images were obtained through the head.  Coronal and sagittal reformations were also performed. Contrast was not administered.     Automatic exposure control was utilized to reduce the patient's radiation dose.     DLP= 963     COMPARISON:  None.     FINDINGS:  No acute intracranial hemorrhage, edema or mass. No acute parenchymal abnormality.     There is no hydrocephalus, evidence of herniation or midline shift. The ventricles and sulci are normal.     There is normal gray white differentiation.     The osseous structures are normal.     The mastoid air cells are clear.     The auditory canals are patent bilaterally.     The globes and orbital  contents are normal bilaterally.     The visualized maxillary, ethmoid and sphenoid sinuses are clear.     Impression:     No acute intracranial abnormality identified.        Electronically signed by: Som Bernal  Date:                                            02/22/2024  Time:                                           13:49           Exam Ended: 02/22/24 13:46 CST Last Resulted: 02/22/24 13:49 CST               2. Abnormal uterine bleeding (AUB)  Pt followed by Dr Ryan CONNER. Keep appts. Pt states her LMP 3-10-24 and currently has spotting.     3. Iron deficiency anemia due to chronic blood loss  Pt stopped iron supplement after labs done 3-8-24- pt was taking BID. Will get repeat CBC and iron profile today. ER precautions given.  Pt requesting blood work to determine if she has Sickle cell as it runs in her dad's side of family. Will get Hgb Electrophoresis today.     4. Chronic pain of right ankle  Pt has been having right ankle pain for a couple of years. States she sustained an ankle fracture and was followed in Ortho cl here. See Ortho note 7-24-23:  Plan:      MDM: Prior external referring provider notes reviewed. Prior external referring provider studies reviewed.   Dx:  Subchondral stress reaction on the both sides of the posterior facet of the subtalar joint / early stages of talocalcaneal arthritis   Treatment Plan:   - Discussed with patient diagnosis, prognosis, and treatment recommendations. Education provided.    .- recommended over-the-counter shoe orthotics   - recommended the patient wear  athletic footwear with arch support and stay away from flip-flops/sandals  - recommend the patient continue with physical therapy to maximize her ankle range of motion  Over the counter NSAID and/or tylenol provided you do not have contraindications such as but not limited to liver or kidney disease or uncontrolled blood pressure. If you're doctors have told you to to not take them based on your  health, do not take them.   - can consider a subtalar joint diagnostic/therapeutic CSI in the future (over 6 weeks from now to give time for the stress reaction to heal)  -recommended and encouraged weight loss  Imaging: radiological studies ordered and independently reviewed; discussed with patient; pending radiologist interpretation.   Weight Management: is paramount. BMI >35; recommend to discuss with PCP about bariatric surgery options if applicable. A bmi 24.9 or less may provide further relief..   Procedure: Discussed CSI/VSI as treatment options; discussed CSI vs VS injections as treatment options in future if conservative measures do not improve symptoms.  Activity: Activity as tolerated; HEP to include aerobic conditioning and strength training with non-painful activity. ROM/STG exercises. Proper footware; assistive devises to avoid limping.   Therapy: Physical Therapy  RTC: PRN; call if any issues.       This note is dictated using the [employer] Fluency Direct word recognition program. There are word recognition mistakes that are occasionally missed on review.     Shira Toscano MD  Sports Medicine Fellow          Electronically signed by Zohaib Mishra MD at 8/7/2023 11:21 AM      MRI Right ankle done 7-14-23 showing:  MRI Ankle Without Contrast Right  Order: [id number]  Status: Final result       Visible to patient: Yes (seen)       Next appt: None       Dx: Chronic pain of right ankle    0 Result Notes  Details     Reading Physician Reading Date Result Priority   Jacinto Treviño MD  [phone] 7/17/2023 Routine      Narrative & Impression  EXAMINATION:  MRI ANKLE WITHOUT CONTRAST RIGHT     CLINICAL HISTORY:  Ankle pain, tendon abnormality suspected, neg xray;  Pain in right ankle and joints of right foot     TECHNIQUE:  Unenhanced, multiplanar, multisequence MR imaging of the right ankle was obtained.     COMPARISON:  Radiographs right ankle used for comparison dated 11/02/2021     FINDINGS:  The  syndesmosis is intact.  The talofibular ligaments are intact.  Deltoid ligament complex is intact.  Spring ligament is intact.  Lisfranc ligament is intact.     Subchondral edema is present the posterior facet of the subtalar joint along with a subtalar joint effusion.  Degenerative calcaneal enthesopathy is present.  The Achilles tendon is normal in course and caliber.  The 2 cm ganglion extends from the talonavicular joint deep to the extensor digitorum tendons.  The peroneal tendons and posterior tendons are normal in course and caliber.  Sinus tarsi is unremarkable.  Plantar fasciitis is present at the origin of the middle band.     Impression:     Subchondral stress reaction is present on both sides of the posterior facet of the subtalar joint and is nonspecific.     Degenerative plantar calcaneal enthesopathy which is somewhat advanced for the patient's age.  Plantar fasciitis is present at the origin of the middle band.     A 2 cm ganglion extends from the talonavicular joint deep to the extensor digitorum tendons.        Electronically signed by: Jacinto Treviño  Date:                                            07/17/2023  Time:                                           10:57             Exam Ended: 07/14/23 13:04 CDT Last Resulted: 07/17/23 10:57 CDT            Pt states she was unhappy with the determination in care she received here at [facility]. Pt requesting to be referred to an outside Ortho specialits. Informed pt due to her medicaid coverage there are no local Ortho Mds that accept medicaid so will need referral to [facility]. Refer to Ortho at [facility] in [facility] for further eval and mgmt.     5. Chronic bilateral low back pain, unspecified whether sciatica present  Pt has been having low back pain since the age of 6. Pt states she is followed by Dr Yo for Chiropractic care in [place]. Pt had MRI L-spine done 6-15-23 showing:  MRI Lumbar Spine Without Contrast  Order: [id number]  Status: Final  result       Visible to patient: Yes (seen)       Next appt: None       Dx: Chronic bilateral low back pain, unsp...    0 Result Notes       1 Follow-up Encounter  Details     Reading Physician Reading Date Result Priority   Zane Saravia MD  [phone] 6/15/2023 Routine      Narrative & Impression  EXAMINATION:  MRI LUMBAR SPINE WITHOUT CONTRAST     TECHNIQUE:  Low back pain, symptoms persist with > 6wks conservative treatment;Low back pain, unspecified     COMPARISON:  None available     FINDINGS:  For the purpose of this report, the most inferior well developed intervertebral disc space is presumed to represent L5-S1. Lumbar vertebrae stature and alignment is is unremarkable.  There are no acute marrow edematous signals.  The visualized thoracic cord is unremarkable. The conus medullaris terminates at L1.  Disc segmental analysis is given below:     At L1-L2, disc is unremarkable.  Central canal is not stenosed and there are no narrowings of the neural foramen.     At L2-L3, disc is unremarkable.  Central canal is not stenosed and there are no narrowings of the neural foramen.     At L3-L4, disc height is preserved.  There is no central canal stenosis and there are no narrowings of the neural foramen.     At L4-L5, disc height is preserved.  Central canal is not stenosed and there are no narrowings are neural foramen.     The L5-S1 disc is desiccated.  There is L5-S1 bulging of annulus fibrosis which slightly flattens the ventral thecal sac.  There is no significant central canal stenosis.  There are also no significant narrowings of the neural foramen.     Impression:     L5-S1 disc desiccation and bulging of annulus fibrosis slightly flattening the ventral thecal sac.  Otherwise, no significant lumbar degenerative disc disease and spondylosis.        Electronically signed by: Zane Saravia  Date:                                            06/15/2023  Time:                                           12:56              Exam Ended: 06/15/23 12:04 CDT Last Resulted: 06/15/23 12:56 CDT            Informed pt she will need to seek SSI disability determination to qualify for SNAP disability benefits. Pt states she has applied and is awaiting the denial letter. Pt denies being referred to Neuro surgery for eval and txmt. Refer to Neuro surgery in [facility] for further eval and mgmt.       6. Screening for hypertension  Labs in 3 months.      7. Well adult exam  Labs in 3 months. Pt followed [facility] [place]. Informed to call and schedule f/u appt.     8. Chronic pain of left knee  Pt states she has been having left knee pain > 1 year. XR left knee done 2-9-24 showing:   X-Ray Knee 1 or 2 View Left  Order: [id number]  Status: Final result       Visible to patient: Yes (seen)       Next appt: None       Dx: Chronic pain of left knee    0 Result Notes  Details    Reading Physician Reading Date Result Priority   Ponce La MD  [phone] 2/9/2024 Routine     Narrative & Impression  EXAMINATION:  XR KNEE 1 OR 2 VIEW LEFT     CLINICAL HISTORY:  Left knee pain;Pain in left knee     COMPARISON:  None.     FINDINGS:  No acute displaced fractures or dislocations.     Joint spaces preserved.     No blastic or lytic lesions.     Soft tissues within normal limits.     Impression:     No acute osseous abnormality.        Electronically signed by: Ponce La  Date:                                            02/09/2024  Time:                                           09:37           Exam Ended: 02/09/24 09:32 CST Last Resulted: 02/09/24 09:37 CST             9. Hematuria, unspecified type  UA C&S cyto in 3 months.          Follow up in about 3 months (around 6/14/2024) for with labs 1 week prior to appt.. In addition to their scheduled follow up, the patient has also been instructed to follow up on as needed basis.     No future appointments.     EVA Nichols

## 2024-03-15 DIAGNOSIS — Z83.2 FAMILY HISTORY OF SICKLE CELL ANEMIA: Primary | ICD-10-CM

## 2024-03-15 DIAGNOSIS — D50.0 IRON DEFICIENCY ANEMIA DUE TO CHRONIC BLOOD LOSS: ICD-10-CM

## 2024-03-15 LAB
ALBUMIN % SPEP (OHS): 51.62
ALBUMIN SERPL-MCNC: 3.6 G/DL (ref 3.5–5)
ALBUMIN/GLOB SERPL: 1.1 RATIO (ref 1.1–2)
ALPHA 1 GLOB (OHS): 0.19 GM/DL
ALPHA 1 GLOB% (OHS): 2.67
ALPHA 2 GLOB % (OHS): 11.97
ALPHA 2 GLOB (OHS): 0.84 GM/DL
BETA GLOB (OHS): 1.21 GM/DL
BETA GLOB% (OHS): 17.22
GAMMA GLOBULIN % (OHS): 16.52
GAMMA GLOBULIN (OHS): 1.16 GM/DL
GLOBULIN SER-MCNC: 3.4 GM/DL (ref 2.4–3.5)
KAPPA LC FREE SER-MCNC: 2.67 MG/DL (ref 0.33–1.94)
KAPPA LC FREE/LAMBDA FREE SER: 1.59 {RATIO} (ref 0.26–1.65)
LAMBDA LC FREE SERPL-MCNC: 1.68 MG/DL (ref 0.57–2.63)
M SPIKE % (OHS): NORMAL
M SPIKE (OHS): NORMAL
PATH REV: NORMAL
PROT SERPL-MCNC: 7 GM/DL (ref 6.4–8.3)

## 2024-03-15 NOTE — PROGRESS NOTES
Pt wants to be screened for sickle cell as it runs on her father and mother's side of her family. Pt has had anemia and is concerned she may have sickle cell disease. Will get Hgb Electrophoresis in 1 week for further eval and mgmt. Pt verbalized understanding. Pt also informed lab results show repeat iron level is at 29 down from 284,. Informed pt iron level 284 was possible lab error. Informed to take iron med as prescribed with Vit C and will re-eval with next labs. Pt verbalized understanding. CBC in 1 month.

## 2024-03-19 ENCOUNTER — LAB VISIT (OUTPATIENT)
Dept: LAB | Facility: HOSPITAL | Age: 34
End: 2024-03-19
Attending: NURSE PRACTITIONER
Payer: MEDICAID

## 2024-03-19 DIAGNOSIS — Z83.2 FAMILY HISTORY OF SICKLE CELL ANEMIA: ICD-10-CM

## 2024-03-19 DIAGNOSIS — D50.0 IRON DEFICIENCY ANEMIA DUE TO CHRONIC BLOOD LOSS: ICD-10-CM

## 2024-03-19 LAB
BASOPHILS # BLD AUTO: 0.03 X10(3)/MCL
BASOPHILS NFR BLD AUTO: 0.3 %
EOSINOPHIL # BLD AUTO: 0.17 X10(3)/MCL (ref 0–0.9)
EOSINOPHIL NFR BLD AUTO: 1.9 %
ERYTHROCYTE [DISTWIDTH] IN BLOOD BY AUTOMATED COUNT: 24.9 % (ref 11.5–17)
HCT VFR BLD AUTO: 34.5 % (ref 37–47)
HGB BLD-MCNC: 9.7 G/DL (ref 12–16)
IMM GRANULOCYTES # BLD AUTO: 0.01 X10(3)/MCL (ref 0–0.04)
IMM GRANULOCYTES NFR BLD AUTO: 0.1 %
LYMPHOCYTES # BLD AUTO: 1.85 X10(3)/MCL (ref 0.6–4.6)
LYMPHOCYTES NFR BLD AUTO: 20.5 %
MCH RBC QN AUTO: 23.3 PG (ref 27–31)
MCHC RBC AUTO-ENTMCNC: 28.1 G/DL (ref 33–36)
MCV RBC AUTO: 82.7 FL (ref 80–94)
MONOCYTES # BLD AUTO: 0.35 X10(3)/MCL (ref 0.1–1.3)
MONOCYTES NFR BLD AUTO: 3.9 %
NEUTROPHILS # BLD AUTO: 6.6 X10(3)/MCL (ref 2.1–9.2)
NEUTROPHILS NFR BLD AUTO: 73.3 %
PLATELET # BLD AUTO: 248 X10(3)/MCL (ref 130–400)
PMV BLD AUTO: 9.8 FL (ref 7.4–10.4)
RBC # BLD AUTO: 4.17 X10(6)/MCL (ref 4.2–5.4)
WBC # SPEC AUTO: 9.01 X10(3)/MCL (ref 4.5–11.5)

## 2024-03-19 PROCEDURE — 36415 COLL VENOUS BLD VENIPUNCTURE: CPT

## 2024-03-19 PROCEDURE — 83021 HEMOGLOBIN CHROMOTOGRAPHY: CPT

## 2024-03-19 PROCEDURE — 85025 COMPLETE CBC W/AUTO DIFF WBC: CPT

## 2024-03-20 ENCOUNTER — CLINICAL SUPPORT (OUTPATIENT)
Dept: REHABILITATION | Facility: HOSPITAL | Age: 34
End: 2024-03-20
Payer: MEDICAID

## 2024-03-20 DIAGNOSIS — R29.898 IMPAIRED FLEXIBILITY OF LOWER EXTREMITY: ICD-10-CM

## 2024-03-20 DIAGNOSIS — M21.42 BILATERAL PES PLANUS: ICD-10-CM

## 2024-03-20 DIAGNOSIS — G89.29 CHRONIC PAIN OF LEFT KNEE: Primary | ICD-10-CM

## 2024-03-20 DIAGNOSIS — M21.41 BILATERAL PES PLANUS: ICD-10-CM

## 2024-03-20 DIAGNOSIS — M25.562 CHRONIC PAIN OF LEFT KNEE: Primary | ICD-10-CM

## 2024-03-20 DIAGNOSIS — R29.898 DECREASED STRENGTH INVOLVING KNEE JOINT: ICD-10-CM

## 2024-03-20 PROCEDURE — 97110 THERAPEUTIC EXERCISES: CPT | Mod: CQ

## 2024-03-20 NOTE — PROGRESS NOTES
[facility]   Physical Therapy Treatment Note      Name: Nikky Roman  Clinic Number: [id number]    Therapy Diagnosis:   No diagnosis found.    Physician: Marilee Jimenez FNP    Visit Date: 3/20/2024    Physician: Marilee Jimenez FNP     Physician Orders: PT Eval and Treat, 2 wk 6  Medical Diagnosis from Referral: M25.562, G 89.29- Chronic L knee pain  Evaluation Date: 3/4/2024  Authorization Period Expiration: 5/29/24  Plan of Care Expiration: 5/29/24  Reassessment / Plan of Care Due: 3/25/2024  Visit # / Visits authorized: 3/8     Functional knee FOTO: 56 (3/20/24) increased 22 (3/4/24)     Precautions: Standard      Time In: 1100  Time Out: 1140  Total Appointment Time (timed & untimed codes): 40 minutes       PTA Visit #: 3/5       Subjective     Patient reports: her biggest problem is the stiffness and cracking/popping when she goes up steps and does squats.   She was compliant with home exercise program.  Response to previous treatment: sore in medial L knee  Functional change: minimal thus far    Pain: 3/10  Location: L knee    Objective      (-) muscle tightness in L HS  (+) muscle tightness in L quad and gastroc    Treatment     Nikky received the treatments listed below:      therapeutic exercises to develop strength and ROM for 35 minutes including:  Manual therapy:  joint mobs for 5 minutes:       Therapeutic Exercise Grid     Exercise 1  Exercise 2  Exercise 3  Exercise 4    Exercise :    L LE: hamstring,  quadriceps L LE: gastroc stretch (L) VMO LAQ with DF  Knee flexion with TB   Repetition/Time :    HS: 60s  Quad: 3 x 30s   3 x 30s   15 each   20x3 sec   Resist or Assist :             Red TB     Comment :                 Done :    yes  yes   yes yes                      Exercise 5  Exercise 6  Exercise 7  Exercise 8    Exercise :    4 way hip L step-ups Heel raises  Mini squats Leg press   Repetition/Time :       2x10 20  10      Resist or Assist :                   Comment :       6 inch step         Done :    no   yes   yes                         Exercise 9  Exercise 10  Exercise 11  Exercise 12    Exercise :    BOSU   L single leg stance NuStep Joint mobs     Repetition/Time :       44 sec max   7 min   5 min     Resist or Assist :          L4, Seat 6        Comment :                  Done :       yes   yes   yes                      Exercise 13 Exercise 14  Exercise 15  Exercise 16   Exercise :                  Repetition/Time :                  Resist or Assist :                  Comment :                  Done :                                   Patient Education and Home Exercises       Education provided:   - importance and benefits of performing HEP daily outside of therapy for optimal improvements  - PT to focus on stretching and strengthening muscles that surround knee joint complex  - information provided on arch supports to purchase to place in shoes to improve alignment of ankle and knee joints and WB surface of knee joint  - importance of not walking without shoes due to plantar fasciitis     Written Home Exercises Provided: Patient instructed to cont prior HEP. Exercises were reviewed and Nikky was able to demonstrate them prior to the end of the session.  Nikky demonstrated good understanding of the education provided. See EMR under Patient Instructions for exercises provided during therapy sessions.    Assessment     Nikky demonstrated (+) outcome after today's session as evidence by ability to complete all exercises without additional pain.    She required tactile cues for proper execution during VMO. She reported cracking and popping during session however she was able to continue.   She is making a fair progress toward goals as evidence by increasing repetitions and duration for Nustep and increased FOTO score (see above). Nikky continues to demo deficits with L knee stability and strength. Will continue to progress as patient is able.       Nikky Is progressing well towards her goals.   Patient prognosis is Fair.   Patient will benefit from skilled outpatient Physical Therapy to address the deficits stated above and in the chart below, provide patient /family education, and to maximize patientt's level of independence.      Plan of care discussed with patient: Yes  Patient's spiritual, cultural and educational needs considered and patient is agreeable to the plan of care and goals as stated below:      Anticipated Barriers for therapy: chronic knee pain, pes planus in feet    Goals:     Short Term Goals: 4 weeks      Pt will demonstrate knowledge and independence with HEP to continue with exercises outside of therapy to facilitate optimal overall improvements     Pt will improve functional score on knee FOTO by >10 points which indicates improved ability to perform daily tasks with less difficulty and pain -met; 56 on (3/20/24) from 22 on eval date     Reduce c/o pain in left knee to < 2/10 pain level with daily activities     Improve strength in L LE to 4+/5 MMT throughout in order to improve tolerance with overall functional mobility     Pt will purchase arch supports for shoes to improve foot and knee alignment to reduce knee pain     Long Term Goals: 6 weeks      Pt will improve functional score on knee FOTO by >20 points which indicates improved ability to perform daily tasks with less difficulty and pain     Pt will improve L LE proprioception input to improve balance as evidence by improved Single Leg Stance to >30 secs     Pt will reduce risk for falls and improve ability and safety with transfers as evidence by improved 5x sit to stands to < 12 secs     Improve flexibility of B LE in order to reduce mechanical stressors on the knee joint complex     Improve strength in L LE to 5/5 MMT throughout in order to improve tolerance with overall functional mobility    Plan     Plan of care Certification: 5/10/24.     Outpatient Physical Therapy  2 times weekly for 6 weeks to include the following interventions: Neuromuscular Re-ed, Patient Education, Therapeutic Exercise, and pain management .        Jenny Mills PTA

## 2024-03-21 LAB
HGB A MFR BLD ELPH: 97.9 % (ref 95.8–98)
HGB A2 MFR BLD ELPH: 2.1 % (ref 2–3.3)
HGB F MFR BLD ELPH: 0 % (ref 0–0.9)
HGB FRACT BLD ELPH-IMP: NORMAL
M HPLC HB VARIANT, B: NORMAL

## 2024-03-25 ENCOUNTER — CLINICAL SUPPORT (OUTPATIENT)
Dept: REHABILITATION | Facility: HOSPITAL | Age: 34
End: 2024-03-25
Payer: MEDICAID

## 2024-03-25 ENCOUNTER — DOCUMENTATION ONLY (OUTPATIENT)
Dept: REHABILITATION | Facility: HOSPITAL | Age: 34
End: 2024-03-25

## 2024-03-25 DIAGNOSIS — G89.29 CHRONIC PAIN OF LEFT KNEE: Primary | ICD-10-CM

## 2024-03-25 DIAGNOSIS — R29.898 DECREASED STRENGTH INVOLVING KNEE JOINT: ICD-10-CM

## 2024-03-25 DIAGNOSIS — M21.42 BILATERAL PES PLANUS: ICD-10-CM

## 2024-03-25 DIAGNOSIS — M25.562 CHRONIC PAIN OF LEFT KNEE: Primary | ICD-10-CM

## 2024-03-25 DIAGNOSIS — M21.41 BILATERAL PES PLANUS: ICD-10-CM

## 2024-03-25 DIAGNOSIS — R29.898 IMPAIRED FLEXIBILITY OF LOWER EXTREMITY: ICD-10-CM

## 2024-03-25 PROCEDURE — 97110 THERAPEUTIC EXERCISES: CPT

## 2024-03-25 NOTE — PROGRESS NOTES
[facility]   Reassessment / Physical Therapy Treatment Note      Name: Nikky Roman  Clinic Number: [id number]    Therapy Diagnosis:   Encounter Diagnoses   Name Primary?    Chronic pain of left knee Yes    Impaired flexibility of lower extremity     Decreased strength involving knee joint     Bilateral pes planus      Physician: Marilee Jimenez FNP    Visit Date: 3/25/2024    Physician: Marilee Jimenez FNP     Physician Orders: PT Eval and Treat, 2 wk 6  Medical Diagnosis from Referral: M25.562, G 89.29- Chronic L knee pain  Evaluation Date: 3/4/2024  Authorization Period Expiration: 5/29/24  Plan of Care Expiration: 5/29/24  Reassessment / Plan of Care Due: RA- 3/25/2024  Visit # / Visits authorized: 4/8     Functional knee FOTO: 56 (3/20/24) increased 22 (3/4/24)     Precautions: Standard      Time In: 1000  Time Out: 1035  Total Appointment Time (timed & untimed codes): 35 minutes     PTA Visit #: 0/5     Subjective     Patient reports: her pain is mostly in left lateral knee joint, biggest trouble when going up / down a flight of stairs, she does not have a follow-up appointment with NP for 2-3 months and has not seen orthopedic MD for knee pain  She was compliant with home exercise program.  Response to previous treatment: good  Functional change: less L knee pain    Pain: 4/10 without medication for pain  Location: L knee    Objective      L knee ROM: flexion and extension WNL without c/o pain at end range  L L LE strength: 4+/5 MMT throughout  Muscle tightness: hamstrings, quadriceps  Tenderness: Left lateral knee joint line     No pain with patella mobs in all planes     Has not purchased arch supports yet for Pes planus in B feet affecting ankle and knee alignment as well as WB surface on knee joints     5x sit to stand: 15.39 secs without use of B UE  L single leg stance: 40 sec     Treatment     Nikky received the treatments listed below:      therapeutic exercises  to develop strength and ROM for 35 minutes including:  Manual therapy:  joint mobs for 0 minutes:     Therapeutic Exercise Grid     Exercise 1  Exercise 2  Exercise 3  Exercise 4    Exercise :    L LE: hamstring,  quadriceps L LE: gastroc stretch (L) VMO LAQ with DF  Knee flexion with TB   Repetition/Time :     3 x 30 sec   3 x 30s   15 each   20x 5 sec   Resist or Assist :             2 lbs, green TB     Comment :                 Done :    yes  no  no yes                      Exercise 5  Exercise 6  Exercise 7  Exercise 8    Exercise :    4 way hip L step-ups,  Stair negotiation Heel raises  Mini squats Leg press   Repetition/Time :       20  4 x 4 steps  20  10      Resist or Assist :       2 lbs, 6 inch step unsupported         Comment :              Done :    no   yes   no no                    Exercise 9  Exercise 10  Exercise 11  Exercise 12    Exercise :    BOSU   L single leg stance NuStep Joint mobs     Repetition/Time :       40 sec  7 min   5 min     Resist or Assist :          L5, Seat 6        Comment :           LE only       Done :    no   yes   yes   no                     Exercise 13 Exercise 14  Exercise 15  Exercise 16   Exercise :                  Repetition/Time :                  Resist or Assist :                  Comment :                  Done :                                   Patient Education and Home Exercises       Education provided:   - importance and benefits of performing HEP daily outside of therapy for optimal improvements  - PT to focus on stretching and strengthening muscles that surround knee joint complex  - information provided on arch supports to purchase to place in shoes to improve alignment of ankle and knee joints and WB surface of knee joint  - importance of not walking without shoes due to plantar fasciitis     Written Home Exercises Provided: Patient instructed to cont prior HEP. Exercises were reviewed and Nikky was able to demonstrate them prior to the end of  the session.  Nikky demonstrated good understanding of the education provided. See EMR under Patient Instructions for exercises provided during therapy sessions.    Assessment     Pt has completed 4 PT visits since initial eval. Pt continues to have pain in L knee along lateral joint line which has moved from patella tendon initially. L LE muscles that surround knee joint slowly progressing. Improved L LE proprioception input with improved single leg tolerance. Pt able to perform step negotiation unsupported with reciprocal pattern with decreased confidence vs pain. Discussed importance of getting arch supports to improve WB surface on knee joint to help reduce pain. Discussed upcoming end of authorization period and planned discharge with HEP     Nikky Is progressing well towards her goals.   Patient prognosis is Fair.   Patient will benefit from skilled outpatient Physical Therapy to address the deficits stated above and in the chart below, provide patient /family education, and to maximize patientt's level of independence.      Plan of care discussed with patient: Yes  Patient's spiritual, cultural and educational needs considered and patient is agreeable to the plan of care and goals as stated below:      Anticipated Barriers for therapy: chronic knee pain, pes planus in feet    Goals:     Short Term Goals: 4 weeks      Pt will demonstrate knowledge and independence with HEP to continue with exercises outside of therapy to facilitate optimal overall improvements- progressing (pt reports performs HEP most of the time)     Pt will improve functional score on knee FOTO by >10 points which indicates improved ability to perform daily tasks with less difficulty and pain -met (56 on (3/20/24) from 22 on initial eval (3/4/24))     Reduce c/o pain in left knee to < 2/10 pain level with daily activities- progressing (c/o 3/10 pain level only with certain movements)     Improve strength in L LE to 4+/5 MMT throughout  in order to improve tolerance with overall functional mobility- met (4+/5 MMT throughout)     Pt will purchase arch supports for shoes to improve foot and knee alignment to reduce knee pain- not met (has not purchased yet)     Long Term Goals: 6 weeks      Pt will improve functional score on knee FOTO by >20 points which indicates improved ability to perform daily tasks with less difficulty and pain -met (56 on (3/20/24) from 22 on initial eval (3/4/24))     Pt will improve L LE proprioception input to improve balance as evidence by improved Single Leg Stance to >30 secs- met (40 secs)     Pt will reduce risk for falls and improve ability and safety with transfers as evidence by improved 5x sit to stands to < 12 secs- progressing (15.39 secs without use of B UE)     Improve flexibility of B LE in order to reduce mechanical stressors on the knee joint complex- progressing     Improve strength in L LE to 5/5 MMT throughout in order to improve tolerance with overall functional mobility- progressing (4+/5 MMT throughout)    Plan     Plan of care Certification: 5/10/24.     Outpatient Physical Therapy 2 times weekly for 6 weeks to include the following interventions: Neuromuscular Re-ed, Patient Education, Therapeutic Exercise, and pain management . Prepare pt for upcoming discharge from PT with HEP at end of authorization period       Kayla Marcum, PT

## 2024-03-25 NOTE — PROGRESS NOTES
Performed face to face conference with Jenny Mills PTA, regarding pt's POC and progress thus far

## 2024-03-25 NOTE — PROGRESS NOTES
Performed face to face conference with David Baird PTA, regarding pt's POC and progress thus far

## 2024-03-26 NOTE — PROGRESS NOTES
[facility]   Physical Therapy Treatment Note      Name: Nikky Roman  Clinic Number: [id number]    Therapy Diagnosis:   Encounter Diagnoses   Name Primary?    Chronic pain of left knee Yes    Impaired flexibility of lower extremity     Decreased strength involving knee joint     Bilateral pes planus      Physician: Marilee Jimenez FNP    Visit Date: 3/27/2024    Physician: Marilee Jimenez FNP     Physician Orders: PT Eval and Treat, 2 wk 6  Medical Diagnosis from Referral: M25.562, G 89.29- Chronic L knee pain  Evaluation Date: 3/4/2024  Authorization Period Expiration: 5/29/24  Plan of Care Expiration: 5/29/24  Reassessment / Plan of Care Due: RA Completed- 3/25/2024  Visit # / Visits authorized: 5/8     Functional knee FOTO: 56 (3/20/24) increased 22 (3/4/24)     Precautions: Standard      Time In: 1002  Time Out: 1030  Total Appointment Time (timed & untimed codes): 28 minutes     PTA Visit #: 1/5     Subjective     Patient reports: slightly increased pain today due to the weather.  She was compliant with home exercise program.  Response to previous treatment: good  Functional change: less L knee pain    Pain: 4/10 without medication for pain  Location: L knee    Objective      L single leg stance: 57 sec     Treatment     Nikky received the treatments listed below:      therapeutic exercises to develop strength and ROM for 28 minutes including:  Manual therapy:  joint mobs for 0 minutes:     Therapeutic Exercise Grid     Exercise 1  Exercise 2  Exercise 3  Exercise 4    Exercise :    L LE: hamstring,  quadriceps L LE: gastroc stretch (L) VMO LAQ with DF  Knee flexion with TB   Repetition/Time :     3 x 30 sec   3 x 30s   15 each   20x 5 sec   Resist or Assist :             2 lbs, green TB     Comment :                 Done :    yes  no  no yes                      Exercise 5  Exercise 6  Exercise 7  Exercise 8    Exercise :    4 way hip L step-ups,  Stair negotiation Heel  raises  Mini squats Leg press   Repetition/Time :       20  4 x 4 steps  20  10      Resist or Assist :       2 lbs, 6 inch step unsupported         Comment :              Done :    no   yes   yes no                    Exercise 9  Exercise 10  Exercise 11  Exercise 12    Exercise :    BOSU   L single leg stance NuStep Joint mobs     Repetition/Time :       57 sec  7 min   5 min     Resist or Assist :          L5, Seat 6        Comment :           LE only       Done :    no   yes   yes   no                     Exercise 13 Exercise 14  Exercise 15  Exercise 16   Exercise :                  Repetition/Time :                  Resist or Assist :                  Comment :                  Done :                                   Patient Education and Home Exercises       Education provided:   - importance and benefits of performing HEP daily outside of therapy for optimal improvements  - PT to focus on stretching and strengthening muscles that surround knee joint complex  - information provided on arch supports to purchase to place in shoes to improve alignment of ankle and knee joints and WB surface of knee joint  - importance of not walking without shoes due to plantar fasciitis     Written Home Exercises Provided: Patient instructed to cont prior HEP. Exercises were reviewed and Nikky was able to demonstrate them prior to the end of the session.  Nikky demonstrated good understanding of the education provided. See EMR under Patient Instructions for exercises provided during therapy sessions.    Assessment     Nikky demonstrated (+) outcome after today's session as evidence by ability to complete all exercises without additional pain.    She is making a fair progress toward goals as evidence by increasing hold time with single leg stance and ability to successfully negotiate stairs without upper extremity support. Nikky continues to demo deficits with L knee stability and strength. Will continue to progress  as patient is able.      Nikky Is progressing well towards her goals.   Patient prognosis is Fair.   Patient will benefit from skilled outpatient Physical Therapy to address the deficits stated above and in the chart below, provide patient /family education, and to maximize patientt's level of independence.      Plan of care discussed with patient: Yes  Patient's spiritual, cultural and educational needs considered and patient is agreeable to the plan of care and goals as stated below:      Anticipated Barriers for therapy: chronic knee pain, pes planus in feet    Goals:     Short Term Goals: 4 weeks      Pt will demonstrate knowledge and independence with HEP to continue with exercises outside of therapy to facilitate optimal overall improvements- progressing (pt reports performs HEP most of the time)     Pt will improve functional score on knee FOTO by >10 points which indicates improved ability to perform daily tasks with less difficulty and pain -met (56 on (3/20/24) from 22 on initial eval (3/4/24))     Reduce c/o pain in left knee to < 2/10 pain level with daily activities- progressing (c/o 3/10 pain level only with certain movements)     Improve strength in L LE to 4+/5 MMT throughout in order to improve tolerance with overall functional mobility- met (4+/5 MMT throughout)     Pt will purchase arch supports for shoes to improve foot and knee alignment to reduce knee pain- not met (has not purchased yet)     Long Term Goals: 6 weeks      Pt will improve functional score on knee FOTO by >20 points which indicates improved ability to perform daily tasks with less difficulty and pain -met (56 on (3/20/24) from 22 on initial eval (3/4/24))     Pt will improve L LE proprioception input to improve balance as evidence by improved Single Leg Stance to >30 secs- met (40 secs)     Pt will reduce risk for falls and improve ability and safety with transfers as evidence by improved 5x sit to stands to < 12 secs-  progressing (15.39 secs without use of B UE)     Improve flexibility of B LE in order to reduce mechanical stressors on the knee joint complex- progressing     Improve strength in L LE to 5/5 MMT throughout in order to improve tolerance with overall functional mobility- progressing (4+/5 MMT throughout)    Plan     Plan of care Certification: 5/10/24.     Outpatient Physical Therapy 2 times weekly for 6 weeks to include the following interventions: Neuromuscular Re-ed, Patient Education, Therapeutic Exercise, and pain management . Prepare pt for upcoming discharge from PT with HEP at end of authorization period       David Baird, PTA

## 2024-03-27 ENCOUNTER — CLINICAL SUPPORT (OUTPATIENT)
Dept: REHABILITATION | Facility: HOSPITAL | Age: 34
End: 2024-03-27
Payer: MEDICAID

## 2024-03-27 DIAGNOSIS — M21.41 BILATERAL PES PLANUS: ICD-10-CM

## 2024-03-27 DIAGNOSIS — R29.898 IMPAIRED FLEXIBILITY OF LOWER EXTREMITY: ICD-10-CM

## 2024-03-27 DIAGNOSIS — G89.29 CHRONIC PAIN OF LEFT KNEE: Primary | ICD-10-CM

## 2024-03-27 DIAGNOSIS — M21.42 BILATERAL PES PLANUS: ICD-10-CM

## 2024-03-27 DIAGNOSIS — M25.562 CHRONIC PAIN OF LEFT KNEE: Primary | ICD-10-CM

## 2024-03-27 DIAGNOSIS — R29.898 DECREASED STRENGTH INVOLVING KNEE JOINT: ICD-10-CM

## 2024-03-27 PROCEDURE — 97110 THERAPEUTIC EXERCISES: CPT | Mod: CQ

## 2024-04-01 NOTE — PROGRESS NOTES
[facility]   Physical Therapy Treatment Note      Name: Nikky Roman  Clinic Number: [id number]    Therapy Diagnosis:   Encounter Diagnoses   Name Primary?    Chronic pain of left knee Yes    Impaired flexibility of lower extremity     Decreased strength involving knee joint     Bilateral pes planus      Physician: Marilee Jimenez FNP    Visit Date: 4/2/2024    Physician: Marilee Jimenez FNP     Physician Orders: PT Eval and Treat, 2 wk 6  Medical Diagnosis from Referral: M25.562, G 89.29- Chronic L knee pain  Evaluation Date: 3/4/2024  Authorization Period Expiration: 5/29/24  Plan of Care Expiration: 5/29/24  Reassessment / Plan of Care Due: RA Completed- 3/25/2024  Visit # / Visits authorized: 6/8     Functional knee FOTO: 56 (3/20/24) increased 22 (3/4/24)     Precautions: Standard      Time In: 1031  Time Out: 1101  Total Appointment Time (timed & untimed codes): 30 minutes     PTA Visit #: 2/5     Subjective     Patient reports: decreased pain today, with varying pain levels relative to her activity level and the weather.   She was compliant with home exercise program.  Response to previous treatment: good  Functional change: less L knee pain    Pain: 3/10 without medication for pain  Location: L knee    Objective      L single leg stance: 57 sec     Treatment     Nikky received the treatments listed below:      therapeutic exercises to develop strength and ROM for 30 minutes including:  Manual therapy:  joint mobs for 0 minutes:     Therapeutic Exercise Grid     Exercise 1  Exercise 2  Exercise 3  Exercise 4    Exercise :    L LE: hamstring,  quadriceps L LE: gastroc stretch (L) VMO LAQ with DF  Knee flexion with TB   Repetition/Time :     3 x 30 sec   3 x 30s   20 each   20x 5 sec   Resist or Assist :             2 lbs, green TB     Comment :                 Done :    yes  no  yes yes                      Exercise 5  Exercise 6  Exercise 7  Exercise 8    Exercise :    4  way hip L step-ups,  Stair negotiation Heel raises  Mini squats Leg press   Repetition/Time :    20   20  5 x 4 steps  20  15      Resist or Assist :       2 lbs, 6 inch step unsupported         Comment :              Done :    yes   yes   yes no                    Exercise 9  Exercise 10  Exercise 11  Exercise 12    Exercise :    BOSU   L single leg stance NuStep Joint mobs     Repetition/Time :       57 sec  7 min   5 min     Resist or Assist :          L5, Seat 6        Comment :           LE only       Done :    no   no  yes   no                     Exercise 13 Exercise 14  Exercise 15  Exercise 16   Exercise :                  Repetition/Time :                  Resist or Assist :                  Comment :                  Done :                                   Patient Education and Home Exercises       Education provided:   - importance and benefits of performing HEP daily outside of therapy for optimal improvements  - PT to focus on stretching and strengthening muscles that surround knee joint complex  - information provided on arch supports to purchase to place in shoes to improve alignment of ankle and knee joints and WB surface of knee joint  - importance of not walking without shoes due to plantar fasciitis     Written Home Exercises Provided: Patient instructed to cont prior HEP. Exercises were reviewed and Nikky was able to demonstrate them prior to the end of the session.  Nikky demonstrated good understanding of the education provided. See EMR under Patient Instructions for exercises provided during therapy sessions.    Assessment     Nikky demonstrated (+) outcome after today's session as evidenced by ability to increase reps of most exercises as noted without additional pain.    She is making a fair progress toward goals as evidenced by ability to successfully negotiate stairs without upper extremity support. Nikky continues to demo deficits with L knee stability and strength. Will  continue to progress as patient is able.      Nikky Is progressing well towards her goals.   Patient prognosis is Fair.   Patient will benefit from skilled outpatient Physical Therapy to address the deficits stated above and in the chart below, provide patient /family education, and to maximize patientt's level of independence.      Plan of care discussed with patient: Yes  Patient's spiritual, cultural and educational needs considered and patient is agreeable to the plan of care and goals as stated below:      Anticipated Barriers for therapy: chronic knee pain, pes planus in feet    Goals:     Short Term Goals: 4 weeks      Pt will demonstrate knowledge and independence with HEP to continue with exercises outside of therapy to facilitate optimal overall improvements- progressing (pt reports performs HEP most of the time)     Pt will improve functional score on knee FOTO by >10 points which indicates improved ability to perform daily tasks with less difficulty and pain -met (56 on (3/20/24) from 22 on initial eval (3/4/24))     Reduce c/o pain in left knee to < 2/10 pain level with daily activities- progressing (c/o 3/10 pain level only with certain movements)     Improve strength in L LE to 4+/5 MMT throughout in order to improve tolerance with overall functional mobility- met (4+/5 MMT throughout)     Pt will purchase arch supports for shoes to improve foot and knee alignment to reduce knee pain- not met (has not purchased yet)     Long Term Goals: 6 weeks      Pt will improve functional score on knee FOTO by >20 points which indicates improved ability to perform daily tasks with less difficulty and pain -met (56 on (3/20/24) from 22 on initial eval (3/4/24))     Pt will improve L LE proprioception input to improve balance as evidence by improved Single Leg Stance to >30 secs- met (40 secs)     Pt will reduce risk for falls and improve ability and safety with transfers as evidence by improved 5x sit to stands  to < 12 secs- progressing (15.39 secs without use of B UE)     Improve flexibility of B LE in order to reduce mechanical stressors on the knee joint complex- progressing     Improve strength in L LE to 5/5 MMT throughout in order to improve tolerance with overall functional mobility- progressing (4+/5 MMT throughout)    Plan     Plan of care Certification: 5/10/24.     Outpatient Physical Therapy 2 times weekly for 6 weeks to include the following interventions: Neuromuscular Re-ed, Patient Education, Therapeutic Exercise, and pain management . Prepare pt for upcoming discharge from PT with HEP at end of authorization period       David Baird, PTA

## 2024-04-02 ENCOUNTER — CLINICAL SUPPORT (OUTPATIENT)
Dept: REHABILITATION | Facility: HOSPITAL | Age: 34
End: 2024-04-02
Payer: MEDICAID

## 2024-04-02 DIAGNOSIS — M21.42 BILATERAL PES PLANUS: ICD-10-CM

## 2024-04-02 DIAGNOSIS — M21.41 BILATERAL PES PLANUS: ICD-10-CM

## 2024-04-02 DIAGNOSIS — R29.898 DECREASED STRENGTH INVOLVING KNEE JOINT: ICD-10-CM

## 2024-04-02 DIAGNOSIS — G89.29 CHRONIC PAIN OF LEFT KNEE: Primary | ICD-10-CM

## 2024-04-02 DIAGNOSIS — M25.562 CHRONIC PAIN OF LEFT KNEE: Primary | ICD-10-CM

## 2024-04-02 DIAGNOSIS — R29.898 IMPAIRED FLEXIBILITY OF LOWER EXTREMITY: ICD-10-CM

## 2024-04-02 PROCEDURE — 97110 THERAPEUTIC EXERCISES: CPT | Mod: CQ

## 2024-04-13 ENCOUNTER — TELEPHONE (OUTPATIENT)
Dept: INTERNAL MEDICINE | Facility: CLINIC | Age: 34
End: 2024-04-13
Payer: MEDICAID

## 2024-04-15 NOTE — PROGRESS NOTES
[facility]   Physical Therapy Treatment Note      Name: Nikky Roman  Clinic Number: [id number]    Therapy Diagnosis:   Encounter Diagnoses   Name Primary?    Chronic pain of left knee Yes    Impaired flexibility of lower extremity     Decreased strength involving knee joint     Bilateral pes planus      Physician: Marilee Jimenez FNP    Visit Date: 4/16/2024    Physician: Marilee Jimenez FNP     Physician Orders: PT Eval and Treat, 2 wk 6  Medical Diagnosis from Referral: M25.562, G 89.29- Chronic L knee pain  Evaluation Date: 3/4/2024  Authorization Period Expiration: 5/29/24  Plan of Care Expiration: 5/29/24  Reassessment / Plan of Care Due: RA Completed- 3/25/2024  Visit # / Visits authorized: 7/8     Functional knee FOTO: 50 (4/16/24) decreased from 56 (3/20/24)      Precautions: Standard      Time In: 1033  Time Out: 1105  Total Appointment Time (timed & untimed codes): 32 minutes     PTA Visit #: 3/5     Subjective     Patient reports: she fell while walking last weekend and landed on her knees, causing increased pain. Fall was not related to her knee, she just tripped.   She was compliant with home exercise program.  Response to previous treatment: good  Functional change: less L knee pain    Pain: 5/10 without medication for pain  Location: L knee    Objective      L single leg stance:  sec     Treatment     Nikky received the treatments listed below:      therapeutic exercises to develop strength and ROM for 32 minutes including:  Manual therapy:  joint mobs for 0 minutes:     Therapeutic Exercise Grid     Exercise 1  Exercise 2  Exercise 3  Exercise 4    Exercise :    L LE: hamstring,  quadriceps L LE: gastroc stretch (L) VMO LAQ with DF  Knee flexion with TB   Repetition/Time :     3 x 30 sec   3 x 30s   20 each   20x 5 sec   Resist or Assist :             2 lbs, green TB     Comment :                 Done :    yes  no  yes yes                      Exercise 5   Exercise 6  Exercise 7  Exercise 8    Exercise :    4 way hip L step-ups,  Stair negotiation Heel raises  Mini squats Leg press   Repetition/Time :    20   20  5 x 4 steps  20  15      Resist or Assist :       2 lbs, 6 inch step unsupported         Comment :              Done :    yes   yes   yes no                    Exercise 9  Exercise 10  Exercise 11  Exercise 12    Exercise :    BOSU   L single leg stance NuStep Joint mobs     Repetition/Time :       57 sec  7 min   5 min     Resist or Assist :          L5, Seat 6        Comment :           LE only       Done :    no   no  yes   no                     Exercise 13 Exercise 14  Exercise 15  Exercise 16   Exercise :                  Repetition/Time :                  Resist or Assist :                  Comment :                  Done :                                   Patient Education and Home Exercises       Education provided:   - importance and benefits of performing HEP daily outside of therapy for optimal improvements  - PT to focus on stretching and strengthening muscles that surround knee joint complex  - information provided on arch supports to purchase to place in shoes to improve alignment of ankle and knee joints and WB surface of knee joint  - importance of not walking without shoes due to plantar fasciitis     Written Home Exercises Provided: Patient instructed to cont prior HEP. Exercises were reviewed and Nikky was able to demonstrate them prior to the end of the session.  Nikky demonstrated good understanding of the education provided. See EMR under Patient Instructions for exercises provided during therapy sessions.    Assessment     Nikky demonstrated (+) outcome after today's session as evidenced by ability to continue reps of most exercises as noted without additional pain.    She is making slow progress toward goals as evidenced by reduced FOTO score. Nikky continues to demo deficits with L knee stability and strength. Will  continue to progress as patient is able.      Nikky Is progressing well towards her goals.   Patient prognosis is Fair.   Patient will benefit from skilled outpatient Physical Therapy to address the deficits stated above and in the chart below, provide patient /family education, and to maximize patientt's level of independence.      Plan of care discussed with patient: Yes  Patient's spiritual, cultural and educational needs considered and patient is agreeable to the plan of care and goals as stated below:      Anticipated Barriers for therapy: chronic knee pain, pes planus in feet    Goals:     Short Term Goals: 4 weeks      Pt will demonstrate knowledge and independence with HEP to continue with exercises outside of therapy to facilitate optimal overall improvements- progressing (pt reports performs HEP most of the time)     Pt will improve functional score on knee FOTO by >10 points which indicates improved ability to perform daily tasks with less difficulty and pain -met (56 on (3/20/24) from 22 on initial eval (3/4/24))     Reduce c/o pain in left knee to < 2/10 pain level with daily activities- progressing (c/o 3/10 pain level only with certain movements)     Improve strength in L LE to 4+/5 MMT throughout in order to improve tolerance with overall functional mobility- met (4+/5 MMT throughout)     Pt will purchase arch supports for shoes to improve foot and knee alignment to reduce knee pain- not met (has not purchased yet)     Long Term Goals: 6 weeks      Pt will improve functional score on knee FOTO by >20 points which indicates improved ability to perform daily tasks with less difficulty and pain -met (56 on (3/20/24) from 22 on initial eval (3/4/24))     Pt will improve L LE proprioception input to improve balance as evidence by improved Single Leg Stance to >30 secs- met (40 secs)     Pt will reduce risk for falls and improve ability and safety with transfers as evidence by improved 5x sit to stands  to < 12 secs- progressing (15.39 secs without use of B UE)     Improve flexibility of B LE in order to reduce mechanical stressors on the knee joint complex- progressing     Improve strength in L LE to 5/5 MMT throughout in order to improve tolerance with overall functional mobility- progressing (4+/5 MMT throughout)    Plan     Plan of care Certification: 5/10/24.     Outpatient Physical Therapy 2 times weekly for 6 weeks to include the following interventions: Neuromuscular Re-ed, Patient Education, Therapeutic Exercise, and pain management . Prepare pt for upcoming discharge from PT with HEP at end of authorization period       David Baird, PTA

## 2024-04-16 ENCOUNTER — CLINICAL SUPPORT (OUTPATIENT)
Dept: REHABILITATION | Facility: HOSPITAL | Age: 34
End: 2024-04-16
Payer: MEDICAID

## 2024-04-16 DIAGNOSIS — G89.29 CHRONIC PAIN OF LEFT KNEE: Primary | ICD-10-CM

## 2024-04-16 DIAGNOSIS — M25.562 CHRONIC PAIN OF LEFT KNEE: Primary | ICD-10-CM

## 2024-04-16 DIAGNOSIS — M21.42 BILATERAL PES PLANUS: ICD-10-CM

## 2024-04-16 DIAGNOSIS — R29.898 IMPAIRED FLEXIBILITY OF LOWER EXTREMITY: ICD-10-CM

## 2024-04-16 DIAGNOSIS — M21.41 BILATERAL PES PLANUS: ICD-10-CM

## 2024-04-16 DIAGNOSIS — R29.898 DECREASED STRENGTH INVOLVING KNEE JOINT: ICD-10-CM

## 2024-04-16 PROCEDURE — 97110 THERAPEUTIC EXERCISES: CPT | Mod: CQ

## 2024-04-17 ENCOUNTER — CLINICAL SUPPORT (OUTPATIENT)
Dept: REHABILITATION | Facility: HOSPITAL | Age: 34
End: 2024-04-17
Payer: MEDICAID

## 2024-04-17 DIAGNOSIS — R29.898 IMPAIRED FLEXIBILITY OF LOWER EXTREMITY: ICD-10-CM

## 2024-04-17 DIAGNOSIS — G89.29 CHRONIC PAIN OF LEFT KNEE: Primary | ICD-10-CM

## 2024-04-17 DIAGNOSIS — M21.41 BILATERAL PES PLANUS: ICD-10-CM

## 2024-04-17 DIAGNOSIS — R29.898 DECREASED STRENGTH INVOLVING KNEE JOINT: ICD-10-CM

## 2024-04-17 DIAGNOSIS — M21.42 BILATERAL PES PLANUS: ICD-10-CM

## 2024-04-17 DIAGNOSIS — M25.562 CHRONIC PAIN OF LEFT KNEE: Primary | ICD-10-CM

## 2024-04-17 PROCEDURE — 97110 THERAPEUTIC EXERCISES: CPT

## 2024-04-17 NOTE — PLAN OF CARE
[facility] OUTPATIENT THERAPY AND WELLNESS  Physical Therapy Discharge Note    Name: Nikky Roman  Clinic Number: [id number]    Physician:Marilee Jimenez FNP    Physician Orders: PT Eval and Treat  Medical Diagnosis from Referral: M25.562, G 89.29- Chronic L knee pain   Therapy Diagnosis:  Encounter Diagnoses   Name Primary?    Chronic pain of left knee Yes    Impaired flexibility of lower extremity     Decreased strength involving knee joint     Bilateral pes planus      Evaluation Date: 3/4/2024  Authorization Period Expiration: 5/29/24  Plan of Care Expiration: 5/29/24  Reassessment / Plan of Care Due: RA Completed- 3/25/2024  Visit # / Visits authorized: 8/8     Total visits received: 8 plus initial eval  Date of Last visit: 4/17/24    Time In: 1030  Time Out: 1105  Duration of treatment: 35 minutes    SUBJECTIVE     Pt reports: she just saw chiropractor for LBP so kind of stiff. Minimal pain in Left knee. Has not gotten arch supports yet   Pt reports compliance with most of HEP outside of therapy since last session    Pain level: 2/10 without medication for pain  Location of pain: left knee    Functional knee FOTO: 50 (4/16/24) improved from 22 on initial eval (3/4/24)     OBJECTIVE     Update:  L knee ROM: flexion and extension WNL without c/o pain at end range  L L LE strength: 4+/5 MMT throughout  Muscle tightness: hamstrings, quadriceps  Tenderness: Left lateral knee joint line     No pain with patella mobs in all planes     Has not purchased arch supports yet for Pes planus in B feet affecting ankle and knee alignment as well as WB surface on knee joints     5x sit to stand: 11.75 secs without use of B UE  L single leg stance: >40 sec     TREATMENT :      therapeutic exercises to develop strength and ROM for 35 minutes including:  Manual therapy:  joint mobs for 0 minutes:      Therapeutic Exercise Grid     Exercise 1  Exercise 2  Exercise 3  Exercise 4    Exercise :    L LE: hamstring,  quadriceps L LE:  gastroc stretch (L) VMO LAQ with DF  Knee flexion with TB   Repetition/Time :     3 x 30 sec   3 x 30s   15 each   20x 5 sec   Resist or Assist :             3 lbs, blue TB     Comment :                 Done :    yes  no  no yes                      Exercise 5  Exercise 6  Exercise 7  Exercise 8    Exercise :    4 way hip L step-ups,  Stair negotiation Heel raises  Mini squats L LE: Leg press   Repetition/Time :       20    20  10 20     Resist or Assist :       3 lbs, 8 inch step unsupported    30 lbs     Comment :                Done :    no   yes   no yes                    Exercise 9  Exercise 10  Exercise 11  Exercise 12    Exercise :    BOSU: static,  Ball toss L single leg stance NuStep Joint mobs     Repetition/Time :       >40 sec  7 min   5 min     Resist or Assist :          L5, Seat 5      Comment :           LE only       Done :    yes yes   yes   no                     ASSESSMENT      Nikky Roman presented to physical therapy on 4/17/24 for final visit of authorized duration.  Nikky has progressed well with therapy in the areas of Left Knee pain, strength, ROM, balance, and overall functional mobility allowing pt to be less limited with daily activities as evidence by improved functional knee FOTO score. Nikky has met 6 /10 goals set at initial evaluation, and will continue to work at home towards personal goal(s) . Nikky is independent with home exercise program and was given handouts throughout this episode of care to reference for continued wellness and physical fitness .     Contact information was given to patient in case any questions arise in the future or if therapy is needed.    Discharge reason: Patient has met all of his/her goals, Patient has reached the maximum rehab potential for the present time, and Patient requested discharge    Anticipated barriers to therapy: chronic knee pain, pes planus in feet, elevated BMI      FOTO Score:   Initial = 22  Discharge = 50    Goals:  Short  Term Goals: 4 weeks      Pt will demonstrate knowledge and independence with HEP to continue with exercises outside of therapy to facilitate optimal overall improvements- met (pt reports performs HEP most of the time)     Pt will improve functional score on knee FOTO by >10 points which indicates improved ability to perform daily tasks with less difficulty and pain -met (50 on (4/16/24) improved from 22 on initial eval (3/4/24))     Reduce c/o pain in left knee to < 2/10 pain level with daily activities- progressing (c/o 2-5/10 pain level only with certain movements)     Improve strength in L LE to 4+/5 MMT throughout in order to improve tolerance with overall functional mobility- met (4+/5 MMT throughout)     Pt will purchase arch supports for shoes to improve foot and knee alignment to reduce knee pain- not met (has not purchased yet)     Long Term Goals: 6 weeks      Pt will improve functional score on knee FOTO by >20 points which indicates improved ability to perform daily tasks with less difficulty and pain -met (50 on (4/16/24) improved from 22 on initial eval (3/4/24))     Pt will improve L LE proprioception input to improve balance as evidence by improved Single Leg Stance to >30 secs- met (40 secs)     Pt will reduce risk for falls and improve ability and safety with transfers as evidence by improved 5x sit to stands to < 12 secs- met (11.75 secs without use of B UE)     Improve flexibility of B LE in order to reduce mechanical stressors on the knee joint complex- progressing     Improve strength in L LE to 5/5 MMT throughout in order to improve tolerance with overall functional mobility- progressing (4+/5 MMT throughout)     PLAN   This patient is discharged from Physical Therapy.     Kayla Marcum, PT

## 2024-04-19 ENCOUNTER — TELEPHONE (OUTPATIENT)
Dept: INTERNAL MEDICINE | Facility: CLINIC | Age: 34
End: 2024-04-19
Payer: MEDICAID

## 2024-05-13 PROBLEM — Z00.00 WELL ADULT EXAM: Status: RESOLVED | Noted: 2024-02-09 | Resolved: 2024-05-13

## 2024-05-13 PROBLEM — Z13.6 SCREENING FOR HYPERTENSION: Status: RESOLVED | Noted: 2024-02-09 | Resolved: 2024-05-13

## 2024-05-24 DIAGNOSIS — G89.29 CHRONIC PAIN OF LEFT KNEE: ICD-10-CM

## 2024-05-24 DIAGNOSIS — M25.562 CHRONIC PAIN OF LEFT KNEE: ICD-10-CM

## 2024-05-24 RX ORDER — IBUPROFEN 800 MG/1
800 TABLET ORAL 3 TIMES DAILY PRN
Qty: 90 TABLET | Refills: 0 | Status: SHIPPED | OUTPATIENT
Start: 2024-05-24

## 2024-08-02 DIAGNOSIS — M25.562 CHRONIC PAIN OF LEFT KNEE: ICD-10-CM

## 2024-08-02 DIAGNOSIS — G89.29 CHRONIC PAIN OF LEFT KNEE: ICD-10-CM

## 2024-08-05 RX ORDER — IBUPROFEN 800 MG/1
800 TABLET ORAL 3 TIMES DAILY PRN
Qty: 90 TABLET | Refills: 0 | Status: SHIPPED | OUTPATIENT
Start: 2024-08-05

## 2024-10-21 ENCOUNTER — OFFICE VISIT (OUTPATIENT)
Dept: INTERNAL MEDICINE | Facility: CLINIC | Age: 34
End: 2024-10-21
Payer: MEDICAID

## 2024-10-21 VITALS
HEART RATE: 79 BPM | TEMPERATURE: 98 F | WEIGHT: 261 LBS | SYSTOLIC BLOOD PRESSURE: 135 MMHG | HEIGHT: 61 IN | DIASTOLIC BLOOD PRESSURE: 84 MMHG | RESPIRATION RATE: 18 BRPM | BODY MASS INDEX: 49.28 KG/M2

## 2024-10-21 DIAGNOSIS — D50.0 IRON DEFICIENCY ANEMIA DUE TO CHRONIC BLOOD LOSS: ICD-10-CM

## 2024-10-21 DIAGNOSIS — R51.9 NONINTRACTABLE HEADACHE, UNSPECIFIED CHRONICITY PATTERN, UNSPECIFIED HEADACHE TYPE: Primary | ICD-10-CM

## 2024-10-21 DIAGNOSIS — G89.29 CHRONIC PAIN OF LEFT KNEE: ICD-10-CM

## 2024-10-21 DIAGNOSIS — M25.562 CHRONIC PAIN OF LEFT KNEE: ICD-10-CM

## 2024-10-21 DIAGNOSIS — N93.9 ABNORMAL UTERINE BLEEDING (AUB): ICD-10-CM

## 2024-10-21 DIAGNOSIS — M25.571 CHRONIC PAIN OF RIGHT ANKLE: ICD-10-CM

## 2024-10-21 DIAGNOSIS — Z00.00 WELL ADULT EXAM: ICD-10-CM

## 2024-10-21 DIAGNOSIS — G89.29 CHRONIC PAIN OF RIGHT ANKLE: ICD-10-CM

## 2024-10-21 DIAGNOSIS — Z13.6 SCREENING FOR HYPERTENSION: ICD-10-CM

## 2024-10-21 DIAGNOSIS — E55.9 VITAMIN D DEFICIENCY: ICD-10-CM

## 2024-10-21 PROCEDURE — 3008F BODY MASS INDEX DOCD: CPT | Mod: CPTII,,, | Performed by: NURSE PRACTITIONER

## 2024-10-21 PROCEDURE — 99214 OFFICE O/P EST MOD 30 MIN: CPT | Mod: PBBFAC | Performed by: NURSE PRACTITIONER

## 2024-10-21 PROCEDURE — 3044F HG A1C LEVEL LT 7.0%: CPT | Mod: CPTII,,, | Performed by: NURSE PRACTITIONER

## 2024-10-21 PROCEDURE — 1160F RVW MEDS BY RX/DR IN RCRD: CPT | Mod: CPTII,,, | Performed by: NURSE PRACTITIONER

## 2024-10-21 PROCEDURE — 1159F MED LIST DOCD IN RCRD: CPT | Mod: CPTII,,, | Performed by: NURSE PRACTITIONER

## 2024-10-21 PROCEDURE — 3079F DIAST BP 80-89 MM HG: CPT | Mod: CPTII,,, | Performed by: NURSE PRACTITIONER

## 2024-10-21 PROCEDURE — 99214 OFFICE O/P EST MOD 30 MIN: CPT | Mod: S$PBB,,, | Performed by: NURSE PRACTITIONER

## 2024-10-21 PROCEDURE — 3075F SYST BP GE 130 - 139MM HG: CPT | Mod: CPTII,,, | Performed by: NURSE PRACTITIONER

## 2024-10-21 RX ORDER — IBUPROFEN 800 MG/1
800 TABLET ORAL 3 TIMES DAILY PRN
Qty: 90 TABLET | Refills: 3 | Status: SHIPPED | OUTPATIENT
Start: 2024-10-21

## 2024-10-21 RX ORDER — FERROUS SULFATE TAB 325 MG (65 MG ELEMENTAL FE) 325 (65 FE) MG
325 TAB ORAL 2 TIMES DAILY
Qty: 60 TABLET | Refills: 3 | Status: SHIPPED | OUTPATIENT
Start: 2024-10-21

## 2024-10-21 NOTE — PROGRESS NOTES
Patient ID: [id number]     Chief Complaint: Left knee pain        HPI:     HPI      Nikky Roman is a 34 y.o. female here today for a follow up.         Immunizations:   Immunization History   Administered Date(s) Administered    DTP 1989, 1989, 02/06/1990, 08/21/1990, 02/25/1994    HIB 08/21/1990    Hepatitis B 08/03/2001, 09/07/2001    Hepatitis B, Pediatric/Adolescent 08/07/2003    MMR 08/21/1990, 02/25/1994    OPV 1989, 1989, 08/21/1990, 02/25/1994    Td (ADULT) 09/07/2001        -------------------------------------    Chronic low back pain    Dysfunctional uterine bleeding    Iron deficiency anemia, unspecified    Morbid obesity        Past Surgical History:   Procedure Laterality Date    DILATION AND CURETTAGE OF UTERUS  01/2023       Review of patient's allergies indicates:  No Known Allergies    Current Outpatient Medications   Medication Instructions    albuterol (PROAIR HFA) 90 mcg/actuation inhaler 2 puffs, Inhalation, Every 6 hours PRN, Rescue    chlorhexidine (PERIDEX) 0.12 % solution SMARTSIG:By Mouth    cholecalciferol (vitamin D3) (VITAMIN D3) 2,000 Units, Oral, Daily    FEROSUL 325 mg (65 mg iron) Tab tablet 2 times daily    ibuprofen (ADVIL,MOTRIN) 800 mg, Oral, 3 times daily PRN    norgestimate-ethinyl estradioL (ORTHO-CYCLEN) 0.25-35 mg-mcg per tablet 1 tablet, Oral, Daily    ondansetron (ZOFRAN) 4 mg, Oral, Every 6 hours       Social History     Socioeconomic History    Marital status: Single    Number of children: 0   Tobacco Use    Smoking status: Never     Passive exposure: Current    Smokeless tobacco: Never   Substance and Sexual Activity    Alcohol use: Not Currently     Alcohol/week: 2.0 standard drinks of alcohol     Types: 2 Cans of beer per week    Drug use: Never    Sexual activity: Yes     Partners: Male     Social Drivers of Health     Financial Resource Strain: Patient Declined (6/13/2024)    Overall Financial Resource Strain (CARDIA)     Difficulty of  Paying Living Expenses: Patient declined   Food Insecurity: Patient Declined (6/13/2024)    Hunger Vital Sign     Worried About Running Out of Food in the Last Year: Patient declined     Ran Out of Food in the Last Year: Patient declined   Transportation Needs: Unmet Transportation Needs (4/3/2023)    PRAPARE - Transportation     Lack of Transportation (Medical): Yes     Lack of Transportation (Non-Medical): Yes   Physical Activity: Insufficiently Active (6/13/2024)    Exercise Vital Sign     Days of Exercise per Week: 1 day     Minutes of Exercise per Session: 10 min   Stress: No Stress Concern Present (6/13/2024)    [employer] - Occupational Stress Questionnaire     Feeling of Stress : Not at all   Housing Stability: Unknown (6/13/2024)    Housing Stability Vital Sign     Unable to Pay for Housing in the Last Year: Patient declined        Family History   Problem Relation Name Age of Onset    Diabetes Father      Hypertension Father      Diabetes Maternal Grandmother      Hypertension Maternal Grandmother      Diabetes Paternal Grandmother      Hypertension Paternal Grandmother          Patient Care Team:  Marilee Jimenez FNP as PCP - General (Family Medicine)  Margaret Hansen LPN as Care Coordinator     Subjective:     Review of Systems     See HPI for details    Constitutional: Denies Change in appetite. Denies Chills. Denies Fever. Denies Night sweats.  Eye: Denies Blurred vision. Denies Discharge. Denies Eye pain.  ENT: Denies Decreased hearing. Denies Sore throat. Denies Swollen glands.  Respiratory: Denies Cough. Denies Shortness of breath. Denies Shortness of breath with exertion. Denies Wheezing.  Cardiovascular: DeniesChest pain at rest. Denies Chest pain with exertion. Denies Irregular heartbeat. Denies Palpitations. Denies Edema.  Gastrointestinal: Denies Abdominal pain. DeniesDiarrhea. Denies Nausea. Denies Vomiting. Denies Hematemesis or  "Hematochezia.  Genitourinary: Denies Dysuria. Denies Urinary frequency. Denies Urinary urgency. Denies Blood in urine.  Endocrine: Denies Cold intolerance. Denies Excessive thirst. Denies Heat intolerance. Denies Weight loss. Denies Weight gain.  Musculoskeletal: Denies Painful joints. Denies Weakness. Admits chronic left knee pain  Integumentary: Denies Rash. Denies Itching. Denies Dry skin.  Neurologic: Denies Dizziness. Denies Fainting. Denies Headache.  Psychiatric: Denies Depression. Denies Anxiety. Denies Suicidal/Homicidal ideations.    All Other ROS: Negative except as stated in HPI.       Objective:     Visit Vitals  /84 (BP Location: Right arm, Patient Position: Sitting)   Pulse 79   Temp 98.4 °F (36.9 °C) (Oral)   Resp 18   Ht 5' 1" (1.549 m)   Wt 118.4 kg (261 lb)   BMI 49.32 kg/m²       Physical Exam    General: Alert and oriented, No acute distress.  Head: Normocephalic, Atraumatic.  Eye: Pupils are equal, round and reactive to light, Extraocular movements are intact, Sclera non-icteric.  Ears/Nose/Throat: Normal, Mucosa moist,Clear.  Neck/Thyroid: Supple, Non-tender, No carotid bruit, No lymphadenopathy, No JVD, Full range of motion.  Respiratory: Clear to auscultation bilaterally; No wheezes, rales or rhonchi,Non-labored respirations, Symmetrical chest wall expansion.  Cardiovascular: Regular rate and rhythm, S1/S2 normal, No murmurs, rubs or gallops.  Gastrointestinal: Soft, Non-tender, Non-distended, Normal bowel sounds, No palpable organomegaly.  Musculoskeletal: Normal range of motion. + grinding noted with AROM to left leg below patella. + pain to right medial to patella.   Integumentary: Warm, Dry, Intact, No suspicious lesions or rashes.  Extremities: No clubbing, cyanosis or edema  Neurologic: No focal deficits, Cranial Nerves II-XII are grossly intact, Motor strength normal upper and lower extremities, Sensory exam intact.  Psychiatric: Normal interaction, Coherent speech, Euthymic " mood, Appropriate affect       Labs Reviewed:     Chemistry:  Lab Results   Component Value Date     02/09/2024    K 4.3 02/09/2024    BUN 13.3 02/09/2024    CREATININE 0.92 02/09/2024    EGFRNORACEVR >60 02/09/2024    GLUCOSE 137 (H) 02/09/2024    CALCIUM 9.1 02/09/2024    ALKPHOS 90 10/24/2023    LABPROT 7.0 03/14/2024    ALBUMIN 3.6 03/14/2024    BILIDIR 0.2 12/07/2021    IBILI 0.20 12/07/2021    AST 27 10/24/2023    ALT 25 10/24/2023    MG 2.0 07/24/2017    YRDVKZGH65YW 14.9 (L) 03/08/2024        Lab Results   Component Value Date    HGBA1C 5.3 03/08/2024        Hematology:  Lab Results   Component Value Date    WBC 9.01 03/19/2024    HGB 9.7 (L) 03/19/2024    HCT 34.5 (L) 03/19/2024     03/19/2024       Lipid Panel:  Lab Results   Component Value Date    CHOL 125 02/09/2024    HDL 38 02/09/2024    LDL 69.00 02/09/2024    TRIG 91 02/09/2024    TOTALCHOLEST 3 02/09/2024        Urine:  Lab Results   Component Value Date    APPEARANCEUA Turbid (A) 02/09/2024    SGUA 1.027 02/09/2024    PROTEINUA 1+ (A) 02/09/2024    KETONESUA Negative 02/09/2024    LEUKOCYTESUR Negative 02/09/2024    RBCUA 0-5 02/09/2024    WBCUA 0-5 02/09/2024    BACTERIA Trace (A) 02/09/2024    SQEPUA Many (A) 02/09/2024    HYALINECASTS None Seen 02/09/2024        Assessment:       ICD-10-CM ICD-9-CM   1. Nonintractable headache, unspecified chronicity pattern, unspecified headache type  R51.9 784.0   2. Abnormal uterine bleeding (AUB)  N93.9 626.9   3. Iron deficiency anemia due to chronic blood loss  D50.0 280.0   4. Chronic pain of right ankle  M25.571 719.47    G89.29 338.29   5. Screening for hypertension  Z13.6 V81.1   6. Well adult exam  Z00.00 V70.0   7. Chronic pain of left knee  M25.562 719.46    G89.29 338.29        Plan:     1. Nonintractable headache, unspecified chronicity pattern, unspecified headache type (Primary)  CT head done 2-22-24 showing:  CT Head Without Contrast  Order: [id number]  Status: Final result        Visible to patient: Yes (seen)       Next appt: None       Dx: Syncope and collapse    0 Result Notes  Details     Reading Physician Reading Date Result Priority   Som Bernal MD  [phone] 2/22/2024 Routine      Narrative & Impression  EXAMINATION:  CT HEAD WITHOUT CONTRAST     CLINICAL HISTORY:  Syncope, recurrent; Syncope and collapse     TECHNIQUE:  Low dose axial images were obtained through the head.  Coronal and sagittal reformations were also performed. Contrast was not administered.     Automatic exposure control was utilized to reduce the patient's radiation dose.     DLP= 963     COMPARISON:  None.     FINDINGS:  No acute intracranial hemorrhage, edema or mass. No acute parenchymal abnormality.     There is no hydrocephalus, evidence of herniation or midline shift. The ventricles and sulci are normal.     There is normal gray white differentiation.     The osseous structures are normal.     The mastoid air cells are clear.     The auditory canals are patent bilaterally.     The globes and orbital contents are normal bilaterally.     The visualized maxillary, ethmoid and sphenoid sinuses are clear.     Impression:     No acute intracranial abnormality identified.        Electronically signed by: Som Bernal  Date:                                            02/22/2024  Time:                                           13:49             Exam Ended: 02/22/24 13:46 CST Last Resulted: 02/22/24 13:49 CST                 2. Abnormal uterine bleeding (AUB)  Pt followed by Dr Ryan CONNER. Keep appts.     3. Iron deficiency anemia due to chronic blood loss  CBC in 3 months.     4. Chronic pain of right ankle  Pt has been having right ankle pain for a couple of years. States she sustained an ankle fracture and was followed in Ortho cl here. See Ortho note 7-24-23:  Plan:      MDM: Prior external referring provider notes reviewed. Prior external referring provider studies reviewed.   Dx:  Subchondral  stress reaction on the both sides of the posterior facet of the subtalar joint / early stages of talocalcaneal arthritis   Treatment Plan:   - Discussed with patient diagnosis, prognosis, and treatment recommendations. Education provided.    .- recommended over-the-counter shoe orthotics   - recommended the patient wear  athletic footwear with arch support and stay away from flip-flops/sandals  - recommend the patient continue with physical therapy to maximize her ankle range of motion  Over the counter NSAID and/or tylenol provided you do not have contraindications such as but not limited to liver or kidney disease or uncontrolled blood pressure. If you're doctors have told you to to not take them based on your health, do not take them.   - can consider a subtalar joint diagnostic/therapeutic CSI in the future (over 6 weeks from now to give time for the stress reaction to heal)  -recommended and encouraged weight loss  Imaging: radiological studies ordered and independently reviewed; discussed with patient; pending radiologist interpretation.   Weight Management: is paramount. BMI >35; recommend to discuss with PCP about bariatric surgery options if applicable. A bmi 24.9 or less may provide further relief..   Procedure: Discussed CSI/VSI as treatment options; discussed CSI vs VS injections as treatment options in future if conservative measures do not improve symptoms.  Activity: Activity as tolerated; HEP to include aerobic conditioning and strength training with non-painful activity. ROM/STG exercises. Proper footware; assistive devises to avoid limping.   Therapy: Physical Therapy  RTC: PRN; call if any issues.       This note is dictated using the M*Modal Fluency Direct word recognition program. There are word recognition mistakes that are occasionally missed on review.     Shira Toscano MD  Sports Medicine Fellow          Electronically signed by Zohaib Mishra MD at 8/7/2023 11:21 AM      MRI Right  ankle done 7-14-23 showing:  MRI Ankle Without Contrast Right  Order: [id number]  Status: Final result       Visible to patient: Yes (seen)       Next appt: None       Dx: Chronic pain of right ankle    0 Result Notes  Details     Reading Physician Reading Date Result Priority   Jacinto Treviño MD  [phone] 7/17/2023 Routine      Narrative & Impression  EXAMINATION:  MRI ANKLE WITHOUT CONTRAST RIGHT     CLINICAL HISTORY:  Ankle pain, tendon abnormality suspected, neg xray;  Pain in right ankle and joints of right foot     TECHNIQUE:  Unenhanced, multiplanar, multisequence MR imaging of the right ankle was obtained.     COMPARISON:  Radiographs right ankle used for comparison dated 11/02/2021     FINDINGS:  The syndesmosis is intact.  The talofibular ligaments are intact.  Deltoid ligament complex is intact.  Spring ligament is intact.  Lisfranc ligament is intact.     Subchondral edema is present the posterior facet of the subtalar joint along with a subtalar joint effusion.  Degenerative calcaneal enthesopathy is present.  The Achilles tendon is normal in course and caliber.  The 2 cm ganglion extends from the talonavicular joint deep to the extensor digitorum tendons.  The peroneal tendons and posterior tendons are normal in course and caliber.  Sinus tarsi is unremarkable.  Plantar fasciitis is present at the origin of the middle band.     Impression:     Subchondral stress reaction is present on both sides of the posterior facet of the subtalar joint and is nonspecific.     Degenerative plantar calcaneal enthesopathy which is somewhat advanced for the patient's age.  Plantar fasciitis is present at the origin of the middle band.     A 2 cm ganglion extends from the talonavicular joint deep to the extensor digitorum tendons.        Electronically signed by: Jacinto Treviño  Date:                                            07/17/2023  Time:                                           10:57             Exam Ended:  07/14/23 13:04 CDT Last Resulted: 07/17/23 10:57 CDT            Pt states she was unhappy with the determination in care she received here at [facility]. Pt requesting to be referred to an outside Ortho specialits. Informed pt due to her medicaid coverage there are no local Ortho Mds that accept medicaid so will need referral to [facility]. Referred to Ortho at [facility] in [facility] at last visit for further eval and mgmt.     5. Screening for hypertension  Labs in 3 months.     6. Well adult exam  Labs in 3 months. Keep GYN appt with Dr Davis for GYN exams.     7. Chronic pain of left knee  Pt states she has been having left knee pain > 1 year. XR left knee done 2-9-24 showing:   X-Ray Knee 1 or 2 View Left  Order: [id number]  Status: Final result       Visible to patient: Yes (seen)       Next appt: None       Dx: Chronic pain of left knee    0 Result Notes  Details     Reading Physician Reading Date Result Priority   Ponce La MD  [phone] 2/9/2024 Routine      Narrative & Impression  EXAMINATION:  XR KNEE 1 OR 2 VIEW LEFT     CLINICAL HISTORY:  Left knee pain;Pain in left knee     COMPARISON:  None.     FINDINGS:  No acute displaced fractures or dislocations.     Joint spaces preserved.     No blastic or lytic lesions.     Soft tissues within normal limits.     Impression:     No acute osseous abnormality.        Electronically signed by: Ponce La  Date:                                            02/09/2024  Time:                                           09:37             Exam Ended: 02/09/24 09:32 CST Last Resulted: 02/09/24 09:37 CST           Pt had PT at [facility] but did not complete 6 weeks. Re-refer to [facility] PT for eval and txmt.          Follow up in about 3 months (around 1/21/2025) for with labs 1 week prior to appt. . In addition to their scheduled follow up, the patient has also been instructed to follow up on as needed basis.     No future appointments.     Marilee Jimenez,  FNP

## 2024-12-16 ENCOUNTER — LAB VISIT (OUTPATIENT)
Dept: LAB | Facility: HOSPITAL | Age: 34
End: 2024-12-16
Attending: NURSE PRACTITIONER
Payer: MEDICAID

## 2024-12-16 DIAGNOSIS — D50.0 IRON DEFICIENCY ANEMIA DUE TO CHRONIC BLOOD LOSS: ICD-10-CM

## 2024-12-16 DIAGNOSIS — E55.9 VITAMIN D DEFICIENCY: ICD-10-CM

## 2024-12-16 DIAGNOSIS — Z13.6 SCREENING FOR HYPERTENSION: ICD-10-CM

## 2024-12-16 LAB
25(OH)D3+25(OH)D2 SERPL-MCNC: 14 NG/ML (ref 30–80)
ALBUMIN SERPL-MCNC: 3.6 G/DL (ref 3.5–5)
ALBUMIN/GLOB SERPL: 1 RATIO (ref 1.1–2)
ALP SERPL-CCNC: 91 UNIT/L (ref 40–150)
ALT SERPL-CCNC: 22 UNIT/L (ref 0–55)
ANION GAP SERPL CALC-SCNC: 7 MEQ/L
AST SERPL-CCNC: 17 UNIT/L (ref 5–34)
BASOPHILS # BLD AUTO: 0.04 X10(3)/MCL
BASOPHILS NFR BLD AUTO: 0.5 %
BILIRUB SERPL-MCNC: 0.3 MG/DL
BUN SERPL-MCNC: 12 MG/DL (ref 7–18.7)
CALCIUM SERPL-MCNC: 9.4 MG/DL (ref 8.4–10.2)
CHLORIDE SERPL-SCNC: 106 MMOL/L (ref 98–107)
CO2 SERPL-SCNC: 27 MMOL/L (ref 22–29)
CREAT SERPL-MCNC: 0.92 MG/DL (ref 0.55–1.02)
CREAT/UREA NIT SERPL: 13
EOSINOPHIL # BLD AUTO: 0.09 X10(3)/MCL (ref 0–0.9)
EOSINOPHIL NFR BLD AUTO: 1.1 %
ERYTHROCYTE [DISTWIDTH] IN BLOOD BY AUTOMATED COUNT: 14.3 % (ref 11.5–17)
GFR SERPLBLD CREATININE-BSD FMLA CKD-EPI: >60 ML/MIN/1.73/M2
GLOBULIN SER-MCNC: 3.7 GM/DL (ref 2.4–3.5)
GLUCOSE SERPL-MCNC: 138 MG/DL (ref 74–100)
HCT VFR BLD AUTO: 36.1 % (ref 37–47)
HGB BLD-MCNC: 11.1 G/DL (ref 12–16)
IMM GRANULOCYTES # BLD AUTO: 0.01 X10(3)/MCL (ref 0–0.04)
IMM GRANULOCYTES NFR BLD AUTO: 0.1 %
IRON SATN MFR SERPL: 12 % (ref 20–50)
IRON SERPL-MCNC: 40 UG/DL (ref 50–170)
LYMPHOCYTES # BLD AUTO: 1.83 X10(3)/MCL (ref 0.6–4.6)
LYMPHOCYTES NFR BLD AUTO: 21.7 %
MCH RBC QN AUTO: 28 PG (ref 27–31)
MCHC RBC AUTO-ENTMCNC: 30.7 G/DL (ref 33–36)
MCV RBC AUTO: 90.9 FL (ref 80–94)
MONOCYTES # BLD AUTO: 0.35 X10(3)/MCL (ref 0.1–1.3)
MONOCYTES NFR BLD AUTO: 4.1 %
NEUTROPHILS # BLD AUTO: 6.12 X10(3)/MCL (ref 2.1–9.2)
NEUTROPHILS NFR BLD AUTO: 72.5 %
PLATELET # BLD AUTO: 247 X10(3)/MCL (ref 130–400)
PMV BLD AUTO: 11.4 FL (ref 7.4–10.4)
POTASSIUM SERPL-SCNC: 4.3 MMOL/L (ref 3.5–5.1)
PROT SERPL-MCNC: 7.3 GM/DL (ref 6.4–8.3)
RBC # BLD AUTO: 3.97 X10(6)/MCL (ref 4.2–5.4)
SODIUM SERPL-SCNC: 140 MMOL/L (ref 136–145)
TIBC SERPL-MCNC: 295 UG/DL (ref 70–310)
TIBC SERPL-MCNC: 335 UG/DL (ref 250–450)
TRANSFERRIN SERPL-MCNC: 313 MG/DL (ref 180–382)
WBC # BLD AUTO: 8.44 X10(3)/MCL (ref 4.5–11.5)

## 2024-12-16 PROCEDURE — 85025 COMPLETE CBC W/AUTO DIFF WBC: CPT

## 2024-12-16 PROCEDURE — 83550 IRON BINDING TEST: CPT

## 2024-12-16 PROCEDURE — 80053 COMPREHEN METABOLIC PANEL: CPT

## 2024-12-16 PROCEDURE — 82306 VITAMIN D 25 HYDROXY: CPT

## 2024-12-16 PROCEDURE — 36415 COLL VENOUS BLD VENIPUNCTURE: CPT

## 2025-01-02 ENCOUNTER — PATIENT MESSAGE (OUTPATIENT)
Dept: INTERNAL MEDICINE | Facility: CLINIC | Age: 35
End: 2025-01-02
Payer: MEDICAID

## 2025-01-03 DIAGNOSIS — G89.29 CHRONIC PAIN OF LEFT KNEE: Primary | ICD-10-CM

## 2025-01-03 DIAGNOSIS — M25.562 CHRONIC PAIN OF LEFT KNEE: Primary | ICD-10-CM

## 2025-01-03 NOTE — PROGRESS NOTES
Pt attended PT at [facility] on 4-17-24, 4-16-24, 4-2-24, 3-27-24, 3-25-24, 3-20-24, 3-13-24, 3-11-24, 3-4-24. Pt states still having Left knee pain. Will order MRI Left knee in 2 weeks.

## 2025-01-03 NOTE — TELEPHONE ENCOUNTER
I located you physical therapy notes in our computer will inform SARAH Jimenez that your notes are in our system. I will call you with an update concerning your order for a MRI.

## 2025-01-13 ENCOUNTER — HOSPITAL ENCOUNTER (OUTPATIENT)
Dept: RADIOLOGY | Facility: HOSPITAL | Age: 35
Discharge: HOME | End: 2025-01-13
Attending: NURSE PRACTITIONER
Payer: MEDICAID

## 2025-01-13 DIAGNOSIS — G89.29 CHRONIC PAIN OF LEFT KNEE: ICD-10-CM

## 2025-01-13 DIAGNOSIS — M25.562 CHRONIC PAIN OF LEFT KNEE: ICD-10-CM

## 2025-01-13 PROCEDURE — 73721 MRI JNT OF LWR EXTRE W/O DYE: CPT | Mod: TC,LT

## 2025-01-23 ENCOUNTER — OFFICE VISIT (OUTPATIENT)
Dept: INTERNAL MEDICINE | Facility: CLINIC | Age: 35
End: 2025-01-23
Payer: MEDICAID

## 2025-01-23 DIAGNOSIS — M25.571 CHRONIC PAIN OF BOTH ANKLES: ICD-10-CM

## 2025-01-23 DIAGNOSIS — M25.562 CHRONIC PAIN OF LEFT KNEE: ICD-10-CM

## 2025-01-23 DIAGNOSIS — R93.6 ABNORMAL MRI, KNEE: ICD-10-CM

## 2025-01-23 DIAGNOSIS — G89.29 CHRONIC PAIN OF LEFT KNEE: ICD-10-CM

## 2025-01-23 DIAGNOSIS — Z00.00 WELL ADULT EXAM: ICD-10-CM

## 2025-01-23 DIAGNOSIS — E55.9 VITAMIN D DEFICIENCY: ICD-10-CM

## 2025-01-23 DIAGNOSIS — M25.572 CHRONIC PAIN OF BOTH ANKLES: ICD-10-CM

## 2025-01-23 DIAGNOSIS — R51.9 NONINTRACTABLE HEADACHE, UNSPECIFIED CHRONICITY PATTERN, UNSPECIFIED HEADACHE TYPE: Primary | ICD-10-CM

## 2025-01-23 DIAGNOSIS — G89.29 CHRONIC PAIN OF BOTH ANKLES: ICD-10-CM

## 2025-01-23 DIAGNOSIS — N93.9 ABNORMAL UTERINE BLEEDING (AUB): ICD-10-CM

## 2025-01-23 DIAGNOSIS — D50.0 IRON DEFICIENCY ANEMIA DUE TO CHRONIC BLOOD LOSS: ICD-10-CM

## 2025-01-23 DIAGNOSIS — Z13.6 SCREENING FOR HYPERTENSION: ICD-10-CM

## 2025-01-23 RX ORDER — FERROUS SULFATE 325(65) MG
325 TABLET ORAL 2 TIMES DAILY
Qty: 60 TABLET | Refills: 6 | Status: SHIPPED | OUTPATIENT
Start: 2025-01-23

## 2025-01-23 RX ORDER — IBUPROFEN 800 MG/1
800 TABLET ORAL 3 TIMES DAILY PRN
Qty: 90 TABLET | Refills: 3 | Status: SHIPPED | OUTPATIENT
Start: 2025-01-23

## 2025-01-23 RX ORDER — CHOLECALCIFEROL (VITAMIN D3) 50 MCG
2000 TABLET ORAL DAILY
Qty: 90 TABLET | Refills: 3 | Status: SHIPPED | OUTPATIENT
Start: 2025-01-23

## 2025-01-23 NOTE — PROGRESS NOTES
Patient ID: [id number]     Chief Complaint: Follow-up (Lab results/MRI results)      HPI:     This is a telemedicine note. Patient was treated using telemedicine, real time audio and video, according to [facility] protocols. Marilee GARCIA, conducted the visit from the [facility]. The patient participated in the visit at a non-[facility] location selected by the patient, identified below. I am licensed in the state where the patient stated they are located. The patient stated that they understood and accepted the privacy and security risks to their information at their location. This visit is not recorded.    Patient was located at the patient's home.       Nikky Roman is a 34 y.o. female here today for a telemedicine visit.       -------------------------------------    Chronic low back pain    Dysfunctional uterine bleeding    Iron deficiency anemia, unspecified    Morbid obesity        Past Surgical History:   Procedure Laterality Date    DILATION AND CURETTAGE OF UTERUS  01/2023       Review of patient's allergies indicates:  No Known Allergies    No outpatient medications have been marked as taking for the 1/23/25 encounter (Office Visit) with Marilee Jimenez FNP.       Social History     Socioeconomic History    Marital status: Single    Number of children: 0   Tobacco Use    Smoking status: Never     Passive exposure: Current    Smokeless tobacco: Never   Substance and Sexual Activity    Alcohol use: Not Currently     Alcohol/week: 2.0 standard drinks of alcohol     Types: 2 Cans of beer per week    Drug use: Never    Sexual activity: Yes     Partners: Male     Social Drivers of Health     Financial Resource Strain: Patient Declined (6/13/2024)    Overall Financial Resource Strain (CARDIA)     Difficulty of Paying Living Expenses: Patient declined   Food Insecurity: Patient Declined (6/13/2024)    Hunger Vital Sign     Worried About Running Out of Food in the Last Year: Patient  declined     Ran Out of Food in the Last Year: Patient declined   Transportation Needs: Unmet Transportation Needs (4/3/2023)    PRAPARE - Transportation     Lack of Transportation (Medical): Yes     Lack of Transportation (Non-Medical): Yes   Physical Activity: Insufficiently Active (6/13/2024)    Exercise Vital Sign     Days of Exercise per Week: 1 day     Minutes of Exercise per Session: 10 min   Stress: No Stress Concern Present (6/13/2024)    [employer] [place] [employer] - Occupational Stress Questionnaire     Feeling of Stress : Not at all   Housing Stability: Unknown (6/13/2024)    Housing Stability Vital Sign     Unable to Pay for Housing in the Last Year: Patient declined        Family History   Problem Relation Name Age of Onset    Diabetes Father      Hypertension Father      Diabetes Maternal Grandmother      Hypertension Maternal Grandmother      Diabetes Paternal Grandmother      Hypertension Paternal Grandmother          Patient Care Team:  Marilee Jimenez FNP as PCP - General (Family Medicine)  Margaret Hansen LPN as Care Coordinator  Sukh Davis MD as Consulting Physician (Obstetrics and Gynecology)      Subjective:       Review of Systems       See HPI for details    Constitutional: Denies Change in appetite. Denies Chills. Denies Fever. Denies Night sweats.  Eye: Denies Blurred vision. Denies Discharge. Denies Eye pain.  ENT: Denies Decreased hearing. Denies Sore throat. Denies Swollen glands.  Respiratory: Denies Cough. Denies Shortness of breath. Denies Shortness of breath with exertion. Denies Wheezing.  Cardiovascular: DeniesChest pain at rest. Denies Chest pain with exertion. Denies Irregular heartbeat. Denies Palpitations. Denies Edema.  Gastrointestinal: Denies Abdominal pain. DeniesDiarrhea. Denies Nausea. Denies Vomiting. Denies Hematemesis or Hematochezia.  Genitourinary: Denies Dysuria. Denies Urinary frequency. Denies Urinary urgency. Denies Blood in  urine.  Endocrine: Denies Cold intolerance. Denies Excessive thirst. Denies Heat intolerance. Denies Weight loss. Denies Weight gain.  Musculoskeletal: Denies Painful joints. Denies Weakness. Admits left knee pain, bilat ankle pain  Integumentary: Denies Rash. Denies Itching. Denies Dry skin.  Neurologic: Denies Dizziness. Denies Fainting. Denies Headache.  Psychiatric: Denies Depression. Denies Anxiety. Denies Suicidal/Homicidal ideations.    All Other ROS: Negative except as stated in HPI.       Objective:     There were no vitals taken for this visit.    Physical Exam    Physical Exam: LIMITED DUE TO TELEMEDICINE RESTRICTIONS.  General: Alert and oriented, No acute distress.  Head: Normocephalic, Atraumatic.  Eye: Sclera non-icteric.  Neck/Thyroid:  Full range of motion.  Respiratory: Non-labored respirations, Symmetrical chest wall expansion.  Musculoskeletal: Normal range of motion. On prior exam 10-21-24: + grinding noted with AROM to left leg below patella. + pain to right medial to patella.   Integumentary:  No visible suspicious lesions or rashes. No diaphoresis.   Neurologic: No focal deficits  Psychiatric: Normal interaction, Coherent speech, Euthymic mood, Appropriate affect       Assessment:       ICD-10-CM ICD-9-CM   1. Nonintractable headache, unspecified chronicity pattern, unspecified headache type  R51.9 784.0   2. Abnormal uterine bleeding (AUB)  N93.9 626.9   3. Iron deficiency anemia due to chronic blood loss  D50.0 280.0   4. Screening for hypertension  Z13.6 V81.1   5. Well adult exam  Z00.00 V70.0   6. Chronic pain of left knee  M25.562 719.46    G89.29 338.29   7. Abnormal MRI, knee  R93.6 793.7   8. Chronic pain of both ankles  M25.571 719.47    G89.29 338.29    M25.572    9. Vitamin D deficiency  E55.9 268.9        Plan:     1. Nonintractable headache, unspecified chronicity pattern, unspecified headache type  CT head done 2-22-24 showing:  CT Head Without Contrast  Order:  [id number]  Status: Final result       Visible to patient: Yes (seen)       Next appt: None       Dx: Syncope and collapse    0 Result Notes  Details     Reading Physician Reading Date Result Priority   Som Bernal MD  [phone] 2/22/2024 Routine      Narrative & Impression  EXAMINATION:  CT HEAD WITHOUT CONTRAST     CLINICAL HISTORY:  Syncope, recurrent; Syncope and collapse     TECHNIQUE:  Low dose axial images were obtained through the head.  Coronal and sagittal reformations were also performed. Contrast was not administered.     Automatic exposure control was utilized to reduce the patient's radiation dose.     DLP= [phone]     COMPARISON:  None.     FINDINGS:  No acute intracranial hemorrhage, edema or mass. No acute parenchymal abnormality.     There is no hydrocephalus, evidence of herniation or midline shift. The ventricles and sulci are normal.     There is normal gray white differentiation.     The osseous structures are normal.     The mastoid air cells are clear.     The auditory canals are patent bilaterally.     The globes and orbital contents are normal bilaterally.     The visualized maxillary, ethmoid and sphenoid sinuses are clear.     Impression:     No acute intracranial abnormality identified.        Electronically signed by: Som Bernal  Date:                                            02/22/2024  Time:                                           13:49             Exam Ended: 02/22/24 13:46 CST Last Resulted: 02/22/24 13:49 CST            Refilled Ibuprofen as prescribed prn pain.     2. Abnormal uterine bleeding (AUB)  CBC stable. CBC in 3 months. Pt has 1st appt with Dr Carlin REYNOSO MD in March 2025. Keep appt. ER precautions given.     3. Iron deficiency anemia due to chronic blood loss  CBC stable. CBC in 3 months. Pt has 1st appt with Dr Carlin REYNOSO MD in March 2025. Keep appt. ER precautions given.     4. Screening for hypertension  Labs in 3 months. Pt has 1st appt with Dr Carlin REYNOSO  MD in March 2025. Keep appt. ER precautions given.   5. Well adult exam  Labs in 3 months. Pt has upcoming appt with Dr Gillis GYN- will be first visit. Pt will address AUB with Dr Gillis.     6. Chronic pain of left knee  Pt had MRI Left knee showing:  MRI Knee Without Contrast Left  Order: [id number]  Status: Final result       Visible to patient: Yes (seen)       Next appt: 03/12/2025 at 09:30 AM in Obstetrics and Gynecology (Charles Gillis MD)       Dx: Chronic pain of left knee    0 Result Notes  Details    Reading Physician Reading Date Result Priority   Jacinto Treviño MD  [phone] 1/14/2025 Routine     Narrative & Impression  EXAMINATION:  MRI KNEE WITHOUT CONTRAST LEFT     CLINICAL HISTORY:  Knee pain, chronic, negative xray (Age >= 5y);Pain in left knee     TECHNIQUE:  Multiplanar, multisequence images were performed about the left knee.  No contrast was administered.     COMPARISON:  Radiographs left knee used for comparison dated 02/09/2024     FINDINGS:  The cruciate ligaments are intact.  Collateral ligaments are intact.  Popliteus tendon is intact.     The menisci are intact.  Articular cartilage is intact.  A moderate-sized knee joint effusion is present.     Patellar tendinitis without organized tear.  The included muscles are intact.  The distance from the tibial tuberosity to the trochlear groove measures 14 mm.  No popliteal cyst.  Tricompartmental hypertrophic change is present.     Impression:     A moderate-sized knee joint effusion is present and is nonspecific     Patellar tendinitis is present without organized tear.     The distance from the tibial tuberosity to the trochlear groove measures 14 mm.  Please evaluate clinically for a potential patellofemoral tracking abnormality.     Tricompartmental hypertrophic change which is advanced for the patient's age.        Electronically signed by:Jacinto Treviño  Date:                                            01/14/2025  Time:                                            15:05        Exam Ended: 01/13/25 09:27 CST Last Resulted: 01/14/25 15:05 CST     Refer to Ortho cl for eval and mgmt. Pt states she is also having bilat ankle pain and unsure if due to compensating for knee pain. Will have pt address in Ortho clinic. Keep appt when scheduled. Cont Ibuprofen as prescribed prn pain.    7. Abnormal MRI, knee   Pt had MRI Left knee showing:  MRI Knee Without Contrast Left  Order: [id number]  Status: Final result       Visible to patient: Yes (seen)       Next appt: 03/12/2025 at 09:30 AM in Obstetrics and Gynecology (Charles Gillis MD)       Dx: Chronic pain of left knee    0 Result Notes  Details    Reading Physician Reading Date Result Priority   Jacinto Treviño MD  [phone] 1/14/2025 Routine     Narrative & Impression  EXAMINATION:  MRI KNEE WITHOUT CONTRAST LEFT     CLINICAL HISTORY:  Knee pain, chronic, negative xray (Age >= 5y);Pain in left knee     TECHNIQUE:  Multiplanar, multisequence images were performed about the left knee.  No contrast was administered.     COMPARISON:  Radiographs left knee used for comparison dated 02/09/2024     FINDINGS:  The cruciate ligaments are intact.  Collateral ligaments are intact.  Popliteus tendon is intact.     The menisci are intact.  Articular cartilage is intact.  A moderate-sized knee joint effusion is present.     Patellar tendinitis without organized tear.  The included muscles are intact.  The distance from the tibial tuberosity to the trochlear groove measures 14 mm.  No popliteal cyst.  Tricompartmental hypertrophic change is present.     Impression:     A moderate-sized knee joint effusion is present and is nonspecific     Patellar tendinitis is present without organized tear.     The distance from the tibial tuberosity to the trochlear groove measures 14 mm.  Please evaluate clinically for a potential patellofemoral tracking abnormality.     Tricompartmental hypertrophic change which is advanced for  the patient's age.        Electronically signed by:Jacinto Treviño  Date:                                            01/14/2025  Time:                                           15:05        Exam Ended: 01/13/25 09:27 CST Last Resulted: 01/14/25 15:05 CST     Refer to Ortho cl for eval and mgmt. Pt states she is also having bilat ankle pain and unsure if due to compensating for knee pain. Will have pt address in Ortho clinic. Keep appt when scheduled. Cont Ibuprofen as prescribed prn pain.    8. Vit D Def  Refilled Vit D3 2000 IU 1 cap po daily. Vit D level in 3 months.          Orders Placed This Encounter   Procedures    CBC Auto Differential     Standing Status:   Future     Standing Expiration Date:   5/2/2025    TSH     Standing Status:   Future     Standing Expiration Date:   5/2/2025    Hemoglobin A1C     Standing Status:   Future     Standing Expiration Date:   5/2/2025    Vitamin D     Standing Status:   Future     Standing Expiration Date:   5/2/2025    Basic Metabolic Panel     Standing Status:   Future     Standing Expiration Date:   5/2/2025     Order Specific Question:   Send normal result to authorizing provider's In Basket if patient is active on MyChart:     Answer:   Yes    Iron and TIBC     Standing Status:   Future     Standing Expiration Date:   5/2/2025     Order Specific Question:   Send normal result to authorizing provider's In Basket if patient is active on MyChart:     Answer:   Yes    Ambulatory referral/consult to Orthopedics     Standing Status:   Future     Standing Expiration Date:   2/23/2026     Referral Priority:   Routine     Referral Type:   Consultation     Requested Specialty:   Orthopedic Surgery     Number of Visits Requested:   1          Follow up in about 3 months (around 4/23/2025) for with labs 1 week prior to appt. . In addition to their scheduled follow up, the patient has also been instructed to follow up on as needed basis.       Video Time Documentation:  Spent 33  minutes with patient face to face discussed health concerns. More than 50% of this time was spent in counseling and coordination of care.

## 2025-03-12 PROCEDURE — 87624 HPV HI-RISK TYP POOLED RSLT: CPT | Performed by: OBSTETRICS & GYNECOLOGY

## 2025-03-12 PROCEDURE — 87661 TRICHOMONAS VAGINALIS AMPLIF: CPT | Performed by: OBSTETRICS & GYNECOLOGY

## 2025-03-12 PROCEDURE — 87591 N.GONORRHOEAE DNA AMP PROB: CPT | Performed by: OBSTETRICS & GYNECOLOGY

## 2025-03-12 PROCEDURE — 87491 CHLMYD TRACH DNA AMP PROBE: CPT | Performed by: OBSTETRICS & GYNECOLOGY

## 2025-03-14 ENCOUNTER — TELEPHONE (OUTPATIENT)
Dept: BARIATRICS | Facility: HOSPITAL | Age: 35
End: 2025-03-14
Payer: MEDICAID

## 2025-04-02 ENCOUNTER — OFFICE VISIT (OUTPATIENT)
Dept: ORTHOPEDICS | Facility: CLINIC | Age: 35
End: 2025-04-02
Payer: MEDICAID

## 2025-04-02 ENCOUNTER — HOSPITAL ENCOUNTER (OUTPATIENT)
Dept: RADIOLOGY | Facility: HOSPITAL | Age: 35
Discharge: HOME | End: 2025-04-02
Attending: NURSE PRACTITIONER
Payer: MEDICAID

## 2025-04-02 VITALS
HEIGHT: 60 IN | WEIGHT: 263.88 LBS | SYSTOLIC BLOOD PRESSURE: 164 MMHG | DIASTOLIC BLOOD PRESSURE: 103 MMHG | HEART RATE: 76 BPM | TEMPERATURE: 98 F | BODY MASS INDEX: 51.8 KG/M2

## 2025-04-02 DIAGNOSIS — M22.2X9 PATELLOFEMORAL STRESS SYNDROME, UNSPECIFIED LATERALITY: Primary | ICD-10-CM

## 2025-04-02 DIAGNOSIS — G89.29 CHRONIC PAIN OF LEFT KNEE: ICD-10-CM

## 2025-04-02 DIAGNOSIS — M25.562 CHRONIC PAIN OF LEFT KNEE: ICD-10-CM

## 2025-04-02 DIAGNOSIS — M17.12 PRIMARY OSTEOARTHRITIS OF LEFT KNEE: ICD-10-CM

## 2025-04-02 PROCEDURE — 3008F BODY MASS INDEX DOCD: CPT | Mod: CPTII,,, | Performed by: NURSE PRACTITIONER

## 2025-04-02 PROCEDURE — 3077F SYST BP >= 140 MM HG: CPT | Mod: CPTII,,, | Performed by: NURSE PRACTITIONER

## 2025-04-02 PROCEDURE — 3080F DIAST BP >= 90 MM HG: CPT | Mod: CPTII,,, | Performed by: NURSE PRACTITIONER

## 2025-04-02 PROCEDURE — 99203 OFFICE O/P NEW LOW 30 MIN: CPT | Mod: S$PBB,,, | Performed by: NURSE PRACTITIONER

## 2025-04-02 PROCEDURE — 1160F RVW MEDS BY RX/DR IN RCRD: CPT | Mod: CPTII,,, | Performed by: NURSE PRACTITIONER

## 2025-04-02 PROCEDURE — 73564 X-RAY EXAM KNEE 4 OR MORE: CPT | Mod: TC,LT

## 2025-04-02 PROCEDURE — 99214 OFFICE O/P EST MOD 30 MIN: CPT | Mod: PBBFAC,25 | Performed by: NURSE PRACTITIONER

## 2025-04-02 PROCEDURE — 1159F MED LIST DOCD IN RCRD: CPT | Mod: CPTII,,, | Performed by: NURSE PRACTITIONER

## 2025-04-02 RX ORDER — DICLOFENAC SODIUM 10 MG/G
4 GEL TOPICAL 3 TIMES DAILY
Qty: 100 G | Refills: 2 | Status: SHIPPED | OUTPATIENT
Start: 2025-04-02 | End: 2026-04-02

## 2025-04-02 NOTE — PROGRESS NOTES
"[facility]  Subjective:   PATIENT ID: Nikky Roman is a 34 y.o. female.   CHIEF COMPLAINT: Knee Pain of the Left Knee (Referred for Lt Knee pain. Pain level 5 and  constant. Pt C/O cracking and Popping, Unable to apply pressure.)    HPI:  Left knee pain anterior around patella sharp and burning  Injury/ Surgical HX r/t CC:  no HX of prior significant joint injury   Onset: Insidious onset  fluctuates    Modifying Factors: exacerbated by:  increased activity (especially running), prolonged sitting, squatting, prolonged walking/ standing, stair climbing, kneeling, jumping improved with:  rest   Associated Symptoms:  [] joint locking/ catching  [x] swelling  [x] decreased ROM  [x] crepitus [x] weakness/ "Giving way"   [] falls  [] recurrent dislocations of patellar  [] difficult sleeping s/t pain        Activity: sedentary with light activity and pain moderately interferes with ADLs, assistive devices none   Previous Treatments:  [x] HEP > 6 weeks  [x] RX PT 8 wks / 2 xs per wk, completed 2024  [] Taping  [] Soft knee splint [x] OTC Pain Relievers: Tylenol, ibuprofen  [] RX Medications: ibuprofen , meloxicam, cyclobenzaprine  [] CSI   [] Orthotics   PMH:  non-smoker, obesity, and L5/S1 buldging disk w/ nerve damage under care of chiropractor, plantar fasciitis b/l feet, HX right ankle fracture few years ago    Family History: + OA   NOTE: New patient referred for left knee pain with minimal conservative treatments.  Symptoms affecting ADLs. .   Employment HX: , currently seeking disability.     Current Medications[1]  Review of patient's allergies indicates:  No Known Allergies  REVIEW OF SYSTEMS:  A ten-point review of systems was performed and is negative, except as mentioned above   Objective:   Body mass index is 51.54 kg/m².   Vitals:    04/02/25 1317 04/02/25 1318 04/02/25 1321   BP: (!) 164/103  (!) (P) 160/99   Pulse: 76     Temp: 98 °F " (36.7 °C)     TempSrc: Oral     Weight: 119.7 kg (263 lb 14.3 oz)     Height: 5' (1.524 m)     PainSc:    5      MSK Knee Exam  General:  no apparent distress, no pain indicators,  obesity  Inspection: lower extremities in proportion with overall body habitus, no erythema   ,  no erythema, limping gait w/ full weight bearing, weight bearing full, positive left knee lateral patellar tracking with sitting   LEFT KNEE RIGHT KNEE   [] Swelling   [x] Valgus deformity  [] Rash [] Contusion  [] Mass  [] Scars [] Swelling   [] Valgus deformity  [] Rash [] Contusion  [] Mass  [] Scars   Palpation:     LEFT KNEE RIGHT KNEE   Joint Warmth: normal  POMT: Quadriceps tendon/ patellar tendon at patellar insertion  [] Patellar grind with compression  [x] Crepitus  [x] Joint effusion  [x] Quad atrophy  [] Active mechanical locking with joint movement  [] Popliteal fullness  [x] Tenderness medial joint line  [] Tenderness lateral joint line  [] Tenderness of tibial plateau with palpation  [x] Tenderness of patellar tendon  [x] Tenderness of quadriceps tendon  [] Tenderness of prepatellar  [] Tenderness of infrapatellar  [] Tenderness of anserine bursae  [x] Tenderness of patellar facet  [] Tenderness over lateral retinaculum  [] Tenderness of medial retinaculum  [] Tenderness of vastus medialis  [] Tenderness of vastus lateralis Joint Warmth: normal  POMT: None  [] Patellar grind with compression  [] Crepitus  [] Joint effusion  [] Quad atrophy  [] Active mechanical locking with joint movement  [] Popliteal fullness  [] Tenderness medial joint line  [] Tenderness lateral joint line  [] Tenderness of tibial plateau with palpation  [] Tenderness of patellar tendon  [] Tenderness of quadriceps tendon  [] Tenderness of prepatellar  [] Tenderness of infrapatellar  [] Tenderness of anserine bursae  [] Tenderness of patellar facet  [] Tenderness over lateral retinaculum  [] Tenderness of medial retinaculum  [] Tenderness of vastus  medialis  [] Tenderness of vastus lateralis   ROM Active Flexion / Extension (0-140)  Left 0 / 115 w/ pain Right 0 / 130 w/o pain   Strength Flexion / Extension (5 / 5)  Left 5 / 5 Right 5 / 5     Special Testing:         Not  Tested IT Band Syndrome L+ L-- R+ R--    [] Janna's Test [] [x] [] [x]     Joint Effusion        [] Ballotable Effusion [] [x] [] [x]    [] Fluid Wave [] [x] [] [x]     Patellar Testing        [] Apprehension [x] [] [] [x]    []  J Sign [] [] [] []     Meniscal Injury        [] Gatito's Test [] [x] [] [x]    [x] Thessaly's Test [] [] [] []     Ligament Injury        [] ACL Anterior Drawer [] [x] [] [x]    [] PCL Posterior Drawer [] [x] [] [x]    [] LCL Varus Test [] [x] [] [x]    [] MCL Valgus Test [] [x] [] [x]      Hip Exam Trendelenburg test positive with noted weakness of gluteus medius muscle  Ankle Exam normal  Neurovascular: Intact to light touch  Neuro/ Psych: Awake, alert, oriented, normal mood and affect  Lymphatic: No LAD  Skin/ Soft Tissue: no rash, skin intact  Cardio: no edema, vascular integrity noted   Assessment:   IMAGING:  XR Ordered by me today 4 views of left knee reviewed and independently interpreted by me with noted no acute findings noted.  Noted patellar tilt. Awaiting radiologist findings.  Findings discussed with patient today.    MRI/CT:   EXAMINATION: MRI KNEE WITHOUT CONTRAST LEFT 1/13/25  CLINICAL HISTORY:  Knee pain, chronic, negative xray (Age >= 5y);Pain in left knee  TECHNIQUE:  Multiplanar, multisequence images were performed about the left knee.  No contrast was administered.  COMPARISON:  Radiographs left knee used for comparison dated 02/09/2024  FINDINGS:  The cruciate ligaments are intact.  Collateral ligaments are intact.  Popliteus tendon is intact.  The menisci are intact.  Articular cartilage is intact.  A moderate-sized knee joint effusion is present.  Patellar tendinitis without organized tear.  The included muscles are intact.  The distance  from the tibial tuberosity to the trochlear groove measures 14 mm.  No popliteal cyst.  Tricompartmental hypertrophic change is present.  Impression:  A moderate-sized knee joint effusion is present and is nonspecific  Patellar tendinitis is present without organized tear.  The distance from the tibial tuberosity to the trochlear groove measures 14 mm.  Please evaluate clinically for a potential patellofemoral tracking abnormality.  Tricompartmental hypertrophic change which is advanced for the patient's age    LABS:   Hemoglobin A1c   Date Value Ref Range Status   03/08/2024 5.3 <=7.0 % Final   02/09/2024 6.2 <=7.0 % Final   12/07/2021 5.3 <<=7.0 % Final     EMR REVIEW: completed with noted Referral documentation reviewed  Diagnosis & Treatment Plan:   DIAGNOSIS: Mild exacerbation of chronic Left PATELLOFEMORAL PAIN SYNDROME with medial joint space narrowing, complicated by obesity and pes planus w/ lumbar pain causing gait dysfunction   1. Patellofemoral stress syndrome, unspecified laterality    2. Primary osteoarthritis of left knee    3. BMI 50.0-59.9, adult      TREATMENT PLAN:  Orders Placed This Encounter    X-Ray Knee Complete 4 or More Views Left    diclofenac sodium (VOLTAREN) 1 % Gel     Treatment plan:    Recommend initial conservative treatments including: RICE- Rest, Ice compress 2-3 times daily for 5-10 minutes, Compression with soft knee splint as needed and elevation if swelling occurs. , HEP with TheraBand > 6 weeks, Formal RX PT to correct pelvic, core and LE musculature imbalances.  Aggressive stretching and strengthening program w/ goal to restore ROM and increase flexibility and strength. , Instructed to contact insurance provider for list of PT providers that take their insurance..   If inadequate will consider CSI.   Ongoing education about DX, treatment recommendations and reasonable expectations including goal to decrease pain and increase function  Conservative treatments including, but  limited to:  activity modification as needed, daily HEP with TheraBand, proper supportive footwear, non-impact muscle strengthening with use of stationary bike (RPM set at 80 or > with slow progression to goal of 40 minutes 3-4 times per week as tolerated), walking-aides as needed, adequate vit D/C, glucosamine 1500 mg/day and/or daily acetaminophen 1000 mg 3 times/ day if able to tolerate.    Weight management is paramount. Immediate BMI reduction goal 5-10% of body weight.  Achieving a BMI < 25 would be optimal for overall health and symptoms relief.   Procedure: n/a  RX Medications: continue medications as RX per PCP; RX topical diclofenac as directed, medication precautions given.   RTC:  PRN if symptoms worsen or return  NOTE: Patient aware that in order to qualify for referral to ortho surgeon for evaluation of surgical treatment options BMI needs to be < 40.  Patient weight goal for BMI < 40 is 200 #.  Will consider referral once BMI meets qualifications.     Leah Menard-Neumann FNP [facility]  Procedure Note:   None  Time Based Billing   Total Time Spent with Patient: 30 minutes   Visit Start Time: 1330  10 minutes spent prior to visit reviewing EMR, prior labs and x-rays  10 minutes spent in visit with patient face-to-face time completing exam, obtaining history, educating on DX and treatment plan.  10 minutes spent after visit completing EMR documentation.   Visit End Time: 1400     *Please be aware that this note has been generated with the assistance of MModal voice-to-text.  Please excuse any spelling or grammatical errors. Positive findings indicted by checkmark*         [1]   Current Outpatient Medications:     cholecalciferol, vitamin D3, (VITAMIN D3) 50 mcg (2,000 unit) Tab, Take 1 tablet (2,000 Units total) by mouth once daily., Disp: 90 tablet, Rfl: 3    FEROSUL 325 mg (65 mg iron) Tab tablet, Take 1 tablet (325 mg total) by mouth 2 (two) times daily., Disp: 60  tablet, Rfl: 6    ibuprofen (ADVIL,MOTRIN) 800 MG tablet, Take 1 tablet (800 mg total) by mouth 3 (three) times daily as needed for Pain., Disp: 90 tablet, Rfl: 3    albuterol (PROAIR HFA) 90 mcg/actuation inhaler, Inhale 2 puffs into the lungs every 6 (six) hours as needed for Wheezing. Rescue, Disp: 18 g, Rfl: 0    chlorhexidine (PERIDEX) 0.12 % solution, SMARTSIG:By Mouth (Patient not taking: Reported on 4/2/2025), Disp: , Rfl:     diclofenac sodium (VOLTAREN) 1 % Gel, Apply 4 g topically 3 (three) times daily., Disp: 100 g, Rfl: 2    norgestimate-ethinyl estradioL (ORTHO-CYCLEN) 0.25-35 mg-mcg per tablet, Take 1 tablet by mouth once daily., Disp: 30 tablet, Rfl: 0    ondansetron (ZOFRAN) 4 MG tablet, Take 1 tablet (4 mg total) by mouth every 6 (six) hours. (Patient not taking: Reported on 4/2/2025), Disp: 12 tablet, Rfl: 0

## 2025-04-09 DIAGNOSIS — Z23 NEED FOR VACCINATION: Primary | ICD-10-CM

## 2025-04-15 DIAGNOSIS — G89.29 CHRONIC PAIN OF LEFT KNEE: ICD-10-CM

## 2025-04-15 DIAGNOSIS — M25.562 CHRONIC PAIN OF LEFT KNEE: ICD-10-CM

## 2025-04-17 ENCOUNTER — INFUSION (OUTPATIENT)
Dept: INFUSION THERAPY | Facility: HOSPITAL | Age: 35
End: 2025-04-17
Attending: OBSTETRICS & GYNECOLOGY
Payer: MEDICAID

## 2025-04-17 VITALS
TEMPERATURE: 98 F | DIASTOLIC BLOOD PRESSURE: 55 MMHG | OXYGEN SATURATION: 98 % | BODY MASS INDEX: 51.8 KG/M2 | RESPIRATION RATE: 18 BRPM | SYSTOLIC BLOOD PRESSURE: 151 MMHG | HEIGHT: 60 IN | HEART RATE: 69 BPM | WEIGHT: 263.88 LBS

## 2025-04-17 DIAGNOSIS — Z23 NEED FOR VACCINATION: Primary | ICD-10-CM

## 2025-04-17 PROCEDURE — 63600175 PHARM REV CODE 636 W HCPCS: Performed by: OBSTETRICS & GYNECOLOGY

## 2025-04-17 PROCEDURE — 96372 THER/PROPH/DIAG INJ SC/IM: CPT

## 2025-04-17 PROCEDURE — 90471 IMMUNIZATION ADMIN: CPT | Performed by: OBSTETRICS & GYNECOLOGY

## 2025-04-17 PROCEDURE — 90651 9VHPV VACCINE 2/3 DOSE IM: CPT | Performed by: OBSTETRICS & GYNECOLOGY

## 2025-04-17 RX ORDER — IBUPROFEN 800 MG/1
800 TABLET ORAL 3 TIMES DAILY PRN
Qty: 90 TABLET | Refills: 3 | OUTPATIENT
Start: 2025-04-17

## 2025-04-17 RX ADMIN — HUMAN PAPILLOMAVIRUS 9-VALENT VACCINE, RECOMBINANT 0.5 ML: 30; 40; 60; 40; 20; 20; 20; 20; 20 INJECTION, SUSPENSION INTRAMUSCULAR at 10:04

## 2025-04-17 NOTE — TELEPHONE ENCOUNTER
Spoke with patient informed that a script was sent 1/2025 with 3 refills,please reach out to your pharmacy.Patient acknowledged understanding

## 2025-04-21 DIAGNOSIS — M72.2 BILATERAL PLANTAR FASCIITIS: ICD-10-CM

## 2025-04-21 DIAGNOSIS — M54.50 LUMBAR PAIN: ICD-10-CM

## 2025-04-21 DIAGNOSIS — M17.12 LEFT KNEE DJD: Primary | ICD-10-CM

## 2025-04-28 ENCOUNTER — CLINICAL SUPPORT (OUTPATIENT)
Dept: REHABILITATION | Facility: HOSPITAL | Age: 35
End: 2025-04-28
Attending: NURSE PRACTITIONER
Payer: MEDICAID

## 2025-04-28 DIAGNOSIS — G89.29 CHRONIC PAIN OF LEFT KNEE: ICD-10-CM

## 2025-04-28 DIAGNOSIS — G89.29 CHRONIC BILATERAL LOW BACK PAIN, UNSPECIFIED WHETHER SCIATICA PRESENT: Primary | ICD-10-CM

## 2025-04-28 DIAGNOSIS — M54.50 CHRONIC BILATERAL LOW BACK PAIN, UNSPECIFIED WHETHER SCIATICA PRESENT: Primary | ICD-10-CM

## 2025-04-28 DIAGNOSIS — R29.898 DECREASED STRENGTH INVOLVING KNEE JOINT: ICD-10-CM

## 2025-04-28 DIAGNOSIS — M21.41 BILATERAL PES PLANUS: ICD-10-CM

## 2025-04-28 DIAGNOSIS — E66.01 MORBID OBESITY: ICD-10-CM

## 2025-04-28 DIAGNOSIS — M22.2X9 PATELLOFEMORAL STRESS SYNDROME, UNSPECIFIED LATERALITY: ICD-10-CM

## 2025-04-28 DIAGNOSIS — R29.898 IMPAIRED FLEXIBILITY OF LOWER EXTREMITY: ICD-10-CM

## 2025-04-28 DIAGNOSIS — M25.562 CHRONIC PAIN OF LEFT KNEE: ICD-10-CM

## 2025-04-28 DIAGNOSIS — M21.42 BILATERAL PES PLANUS: ICD-10-CM

## 2025-04-28 DIAGNOSIS — M17.12 PRIMARY OSTEOARTHRITIS OF LEFT KNEE: ICD-10-CM

## 2025-04-28 PROCEDURE — 97162 PT EVAL MOD COMPLEX 30 MIN: CPT
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
## 2025-05-02 ENCOUNTER — PATIENT MESSAGE (OUTPATIENT)
Dept: INTERNAL MEDICINE | Facility: CLINIC | Age: 35
End: 2025-05-02
Payer: MEDICAID

## 2025-05-05 ENCOUNTER — CLINICAL SUPPORT (OUTPATIENT)
Dept: REHABILITATION | Facility: HOSPITAL | Age: 35
End: 2025-05-05
Attending: NURSE PRACTITIONER
Payer: MEDICAID

## 2025-05-05 DIAGNOSIS — R29.898 DECREASED STRENGTH INVOLVING KNEE JOINT: ICD-10-CM

## 2025-05-05 DIAGNOSIS — M17.12 PRIMARY OSTEOARTHRITIS OF LEFT KNEE: ICD-10-CM

## 2025-05-05 DIAGNOSIS — M25.562 CHRONIC PAIN OF LEFT KNEE: ICD-10-CM

## 2025-05-05 DIAGNOSIS — M21.41 BILATERAL PES PLANUS: ICD-10-CM

## 2025-05-05 DIAGNOSIS — G89.29 CHRONIC BILATERAL LOW BACK PAIN, UNSPECIFIED WHETHER SCIATICA PRESENT: ICD-10-CM

## 2025-05-05 DIAGNOSIS — R29.898 IMPAIRED FLEXIBILITY OF LOWER EXTREMITY: ICD-10-CM

## 2025-05-05 DIAGNOSIS — M22.2X9 PATELLOFEMORAL STRESS SYNDROME, UNSPECIFIED LATERALITY: ICD-10-CM

## 2025-05-05 DIAGNOSIS — M54.50 CHRONIC BILATERAL LOW BACK PAIN, UNSPECIFIED WHETHER SCIATICA PRESENT: ICD-10-CM

## 2025-05-05 DIAGNOSIS — G89.29 CHRONIC PAIN OF LEFT KNEE: ICD-10-CM

## 2025-05-05 DIAGNOSIS — E66.01 MORBID OBESITY: Primary | ICD-10-CM

## 2025-05-05 DIAGNOSIS — M21.42 BILATERAL PES PLANUS: ICD-10-CM

## 2025-05-05 PROCEDURE — 97110 THERAPEUTIC EXERCISES: CPT | Mod: CQ

## 2025-05-05 PROCEDURE — 97140 MANUAL THERAPY 1/> REGIONS: CPT | Mod: CQ

## 2025-05-05 PROCEDURE — 97112 NEUROMUSCULAR REEDUCATION: CPT | Mod: CQ

## 2025-05-05 NOTE — PROGRESS NOTES
Outpatient Rehab    Physical Therapy Visit    Patient Name: Nikky Roman  MRN: [id number]  YOB: 1990  Encounter Date: 5/5/2025    Therapy Diagnosis:   Encounter Diagnoses   Name Primary?    Morbid obesity Yes    Chronic bilateral low back pain, unspecified whether sciatica present     Chronic pain of left knee     Impaired flexibility of lower extremity     Decreased strength involving knee joint     Bilateral pes planus     Patellofemoral stress syndrome, unspecified laterality     Primary osteoarthritis of left knee      Physician: Vianey Banks NP    Physician Orders: Eval and Treat  Medical Diagnosis: Low back pain, unspecified  Unilateral primary osteoarthritis, left knee    Visit # / Visits Authorized:  1 / 12  Insurance Authorization Period: 4/30/2025 to 6/29/2025  Date of Evaluation: 4/28/25  Plan of Care Certification: 4/28/25 to 7/18/25     PT/PTA: PTA   Number of PTA visits since last PT visit:1  Time In: 1245   Time Out: 1330  Total Time (in minutes): 45   Total Billable Time (in minutes): 45    FOTO:  Intake Score:  %  Survey Score 2:  %  Survey Score 3:  %         Subjective   Pt reports continued LB pain. States it is always present. Her L knee only hurts when she is ascending steps..  Pain reported as 9/10. lower back    Objective            Treatment:  Therapeutic Exercise  TE 1: B LE strengthening in supine, SL, and prone (see exercise sheet)  TE 2: Core strengthening in supine (see exercise sheet)  TE 3: Seated LE strengthening exercises (see exercise sheet)  TE 4: NuStep (warm-up)  Manual Therapy  MT 1: B HS, piriformis stretch  MT 2: L LE LAD, L knee Gr II A/P mobilizations  Balance/Neuromuscular Re-Education  NMR 1: Glute and core re-ed with bridges, PPT, and DKTC    Time Entry(in minutes):  Manual Therapy Time Entry: 10  Neuromuscular Re-Education Time Entry: 15  Therapeutic Exercise Time Entry: 20    Assessment & Plan   Assessment: Nikky demonstrated a positive  response to initiation of strengthening exercises and manual work today, as evidenced by ability to complete all assigned exercises and reported no increased pain. Some relief reported in L knee after mobilizations. She was not limited by lower back pain during session.  Evaluation/Treatment Tolerance: Patient tolerated treatment well    Patient will continue to benefit from skilled outpatient physical therapy to address the deficits listed in the problem list box on initial evaluation, provide pt/family education and to maximize pt's level of independence in the home and community environment.     Patient's spiritual, cultural, and educational needs considered and patient agreeable to plan of care and goals.           Plan: Continue with current plan of care and progress as tolerated.    Goals:   Active       PT goals:        Reduce c/o pain in L knee and lumbar spine to <4/10 to improve tolerance with daily activities (Progressing)       Start:  04/28/25    Expected End:  07/25/25            Improve strength in B LE to 4+/5 MMT throughout in order to improve tolerance with overall functional mobility  (Progressing)       Start:  04/28/25    Expected End:  07/25/25            Improve flexibility of B LE in order to reduce mechanical stressors on the lumbar spine and Left knee (Progressing)       Start:  04/28/25    Expected End:  07/25/25             Pt will be able to stand / walk for >30 minutes with < 4/10 pain level in order to complete daily activities including grocery shopping, household chores, etc (Progressing)       Start:  04/28/25    Expected End:  07/25/25             Pt will demonstrate proper bending and lifting techniques to reduce stress on lumbar spine (Progressing)       Start:  04/28/25    Expected End:  07/25/25               continued PT goals:        Pt will demonstrate knowledge and independence with HEP to continue with exercises outside of therapy to facilitate optimal overall improvements  (Progressing)       Start:  04/28/25    Expected End:  07/25/25            Pt will improve functional score on lumbar FOTO by >20 points which indicates improved ability to perform daily tasks with less difficulty and pain (Progressing)       Start:  04/28/25    Expected End:  07/25/25             Pt will purchase arch supports for shoes to improve foot and knee alignment to reduce knee pain  (Progressing)       Start:  04/28/25    Expected End:  07/25/25                David Baird, PTA

## 2025-05-07 NOTE — TELEPHONE ENCOUNTER
Informed patient she needed to contact her gyn doctor to get a mri of her reproductive tract.  Patient verbalized understanding.

## 2025-05-08 ENCOUNTER — CLINICAL SUPPORT (OUTPATIENT)
Dept: REHABILITATION | Facility: HOSPITAL | Age: 35
End: 2025-05-08
Attending: NURSE PRACTITIONER
Payer: MEDICAID

## 2025-05-08 DIAGNOSIS — G89.29 CHRONIC BILATERAL LOW BACK PAIN, UNSPECIFIED WHETHER SCIATICA PRESENT: ICD-10-CM

## 2025-05-08 DIAGNOSIS — M21.41 BILATERAL PES PLANUS: ICD-10-CM

## 2025-05-08 DIAGNOSIS — M25.562 CHRONIC PAIN OF LEFT KNEE: ICD-10-CM

## 2025-05-08 DIAGNOSIS — M54.50 CHRONIC BILATERAL LOW BACK PAIN, UNSPECIFIED WHETHER SCIATICA PRESENT: ICD-10-CM

## 2025-05-08 DIAGNOSIS — R29.898 DECREASED STRENGTH INVOLVING KNEE JOINT: ICD-10-CM

## 2025-05-08 DIAGNOSIS — M17.12 PRIMARY OSTEOARTHRITIS OF LEFT KNEE: ICD-10-CM

## 2025-05-08 DIAGNOSIS — E66.01 MORBID OBESITY: Primary | ICD-10-CM

## 2025-05-08 DIAGNOSIS — M22.2X9 PATELLOFEMORAL STRESS SYNDROME, UNSPECIFIED LATERALITY: ICD-10-CM

## 2025-05-08 DIAGNOSIS — R29.898 IMPAIRED FLEXIBILITY OF LOWER EXTREMITY: ICD-10-CM

## 2025-05-08 DIAGNOSIS — G89.29 CHRONIC PAIN OF LEFT KNEE: ICD-10-CM

## 2025-05-08 DIAGNOSIS — M21.42 BILATERAL PES PLANUS: ICD-10-CM

## 2025-05-08 PROCEDURE — 97110 THERAPEUTIC EXERCISES: CPT | Mod: CQ

## 2025-05-09 NOTE — PROGRESS NOTES
Outpatient Rehab    Physical Therapy Visit    Patient Name: Nikky Roman  MRN: [id number]  YOB: 1990  Encounter Date: 5/8/2025    Therapy Diagnosis:   Encounter Diagnoses   Name Primary?    Morbid obesity Yes    Chronic bilateral low back pain, unspecified whether sciatica present     Chronic pain of left knee     Impaired flexibility of lower extremity     Decreased strength involving knee joint     Bilateral pes planus     Patellofemoral stress syndrome, unspecified laterality     Primary osteoarthritis of left knee      Physician: Vianey Banks NP    Physician Orders: Eval and Treat  Medical Diagnosis: Low back pain, unspecified  Unilateral primary osteoarthritis, left knee    Visit # / Visits Authorized:  2 / 12  Insurance Authorization Period: 4/30/2025 to 6/29/2025  Date of Evaluation: 4/28/25  Plan of Care Certification: 4/28/25 to 7/18/25     PT/PTA: PTA   Number of PTA visits since last PT visit:2  Time In: 1503   Time Out: 1545  Total Time (in minutes): 42   Total Billable Time (in minutes): 42    FOTO:  Intake Score:  %  Survey Score 2:  %  Survey Score 3:  %         Subjective   No new issues reported. She continues to have pain in the left knee with increased weight bearing such as climbing steps. Left knee pain is 5/10..  Pain reported as 9/10. lower back    Objective            Treatment:  Therapeutic Exercise  TE 1: nustep warm up for LE  TE 2: bilateral prone hip extension, SLR, sidelying abd, prone ham curls  TE 3: sidelying clamshells for glute med strengthening  TE 4: lower trunk rotation for thoracolumbar mobility  TE 5: standing heel and toe raises  Manual Therapy  MT 2: L LE LAD, L knee Gr II A/P mobilizations  Balance/Neuromuscular Re-Education  NMR 1: Glute re-ed: double limb bridges  NMR 2: core re-ed: posterior pelvic tilts, dying bug march  NMR 3: quad re-ed: standing TKE    Time Entry(in minutes):  Manual Therapy Time Entry: 10  Neuromuscular Re-Education Time  Entry: 12  Therapeutic Exercise Time Entry: 20    Assessment & Plan   Assessment: Nikky demonstrated a positive response after today's PT session as evidenced by displaying good effort with lower extremity strengthening exercises.. Good effort with standing closed chained quad strengthening exercise. Gait instability, left quad weakness with decreased stability, core weakness, and increased pain in lower back with daily activities noted. Recommend patient continue with skilled PT services.  Evaluation/Treatment Tolerance: Patient tolerated treatment well    Patient will continue to benefit from skilled outpatient physical therapy to address the deficits listed in the problem list box on initial evaluation, provide pt/family education and to maximize pt's level of independence in the home and community environment.     Patient's spiritual, cultural, and educational needs considered and patient agreeable to plan of care and goals.     Education  Education was done with Patient. The patient's learning style includes Listening. The patient Verbalizes understanding.         - Recommended arch supports to improve alignment of ankle and knee joints to improve WB surface on knee joints to facilitate reduction of knee pain. - Written HEP issued with exercises and stretches demonstrated and performed - importance and benefits of performing HEP daily for optimal improvements even after discharged from PT. - PT will focus on stretching and strengthening muscles that surround knee joint and lumbar spine, however will not change the degenerative changes in the knee joint that cause pain         Plan: Continue with current plan of care and progress as tolerated.    Goals:   Active       PT goals:        Reduce c/o pain in L knee and lumbar spine to <4/10 to improve tolerance with daily activities (Progressing)       Start:  04/28/25    Expected End:  07/25/25            Improve strength in B LE to 4+/5 MMT throughout in order  to improve tolerance with overall functional mobility  (Progressing)       Start:  04/28/25    Expected End:  07/25/25            Improve flexibility of B LE in order to reduce mechanical stressors on the lumbar spine and Left knee (Progressing)       Start:  04/28/25    Expected End:  07/25/25             Pt will be able to stand / walk for >30 minutes with < 4/10 pain level in order to complete daily activities including grocery shopping, household chores, etc (Progressing)       Start:  04/28/25    Expected End:  07/25/25             Pt will demonstrate proper bending and lifting techniques to reduce stress on lumbar spine (Progressing)       Start:  04/28/25    Expected End:  07/25/25               continued PT goals:        Pt will demonstrate knowledge and independence with HEP to continue with exercises outside of therapy to facilitate optimal overall improvements (Progressing)       Start:  04/28/25    Expected End:  07/25/25            Pt will improve functional score on lumbar FOTO by >20 points which indicates improved ability to perform daily tasks with less difficulty and pain (Progressing)       Start:  04/28/25    Expected End:  07/25/25             Pt will purchase arch supports for shoes to improve foot and knee alignment to reduce knee pain  (Progressing)       Start:  04/28/25    Expected End:  07/25/25                Jenny Mills PTA

## 2025-05-13 ENCOUNTER — CLINICAL SUPPORT (OUTPATIENT)
Dept: REHABILITATION | Facility: HOSPITAL | Age: 35
End: 2025-05-13
Attending: NURSE PRACTITIONER
Payer: MEDICAID

## 2025-05-13 DIAGNOSIS — M54.50 CHRONIC BILATERAL LOW BACK PAIN, UNSPECIFIED WHETHER SCIATICA PRESENT: ICD-10-CM

## 2025-05-13 DIAGNOSIS — M21.41 BILATERAL PES PLANUS: ICD-10-CM

## 2025-05-13 DIAGNOSIS — R29.898 IMPAIRED FLEXIBILITY OF LOWER EXTREMITY: ICD-10-CM

## 2025-05-13 DIAGNOSIS — G89.29 CHRONIC PAIN OF LEFT KNEE: ICD-10-CM

## 2025-05-13 DIAGNOSIS — G89.29 CHRONIC BILATERAL LOW BACK PAIN, UNSPECIFIED WHETHER SCIATICA PRESENT: ICD-10-CM

## 2025-05-13 DIAGNOSIS — M21.42 BILATERAL PES PLANUS: ICD-10-CM

## 2025-05-13 DIAGNOSIS — R29.898 DECREASED STRENGTH INVOLVING KNEE JOINT: ICD-10-CM

## 2025-05-13 DIAGNOSIS — M25.562 CHRONIC PAIN OF LEFT KNEE: ICD-10-CM

## 2025-05-13 DIAGNOSIS — M22.2X9 PATELLOFEMORAL STRESS SYNDROME, UNSPECIFIED LATERALITY: ICD-10-CM

## 2025-05-13 DIAGNOSIS — M17.12 PRIMARY OSTEOARTHRITIS OF LEFT KNEE: ICD-10-CM

## 2025-05-13 DIAGNOSIS — E66.01 MORBID OBESITY: Primary | ICD-10-CM

## 2025-05-13 PROCEDURE — 97110 THERAPEUTIC EXERCISES: CPT | Mod: CQ

## 2025-05-13 PROCEDURE — 97112 NEUROMUSCULAR REEDUCATION: CPT | Mod: CQ

## 2025-05-13 NOTE — PROGRESS NOTES
Outpatient Rehab    Physical Therapy Visit    Patient Name: Nikky Roman  MRN: [id number]  YOB: 1990  Encounter Date: 5/13/2025    Therapy Diagnosis:   Encounter Diagnoses   Name Primary?    Morbid obesity Yes    Chronic bilateral low back pain, unspecified whether sciatica present     Chronic pain of left knee     Impaired flexibility of lower extremity     Decreased strength involving knee joint     Bilateral pes planus     Patellofemoral stress syndrome, unspecified laterality     Primary osteoarthritis of left knee      Physician: Vianey Banks NP    Physician Orders: Eval and Treat  Medical Diagnosis: Low back pain, unspecified  Unilateral primary osteoarthritis, left knee    Visit # / Visits Authorized:  3 / 12  Insurance Authorization Period: 4/30/2025 to 6/29/2025  Date of Evaluation: 4/28/25  Plan of Care Certification: 4/28/25 to 6/29/25     PT/PTA: PTA   Number of PTA visits since last PT visit:3  Time In: 1250   Time Out: 1330  Total Time (in minutes): 40   Total Billable Time (in minutes): 40    FOTO:  Intake Score:  %  Survey Score 2:  %  Survey Score 3:  %         Subjective   No issues reported. Stated she has not been doing any of the HEP at home. LB is 9/10 and left knee is 5/10 pain level..  Pain reported as 9/10. lower back    Objective            Treatment:  Therapeutic Exercise  TE 2: bilateral prone hip extension, SLR, sidelying abd, prone ham curls  TE 3: sidelying clamshells for glute med strengthening  TE 4: resisted lower trunk rotation for thoracolumbar mobility  Balance/Neuromuscular Re-Education  NMR 1: Glute re-ed: double limb bridges  NMR 2: core re-ed: posterior pelvic tilts, dying bug march  NMR 3: quad re-ed: standing TKE    Time Entry(in minutes):  Neuromuscular Re-Education Time Entry: 15  Therapeutic Exercise Time Entry: 25    Assessment & Plan   Assessment: Nikky demonstrated a fair response after today's PT session as evidenced by no change is pain  level. Focused on strengthening glute medius exercises and quad control with good response. Advised patient on importance of performing HEP at home to address deficits mentioned above. Continued deficits with gait instability, left quad weakness with decreased stability, core weakness, and increased pain in lower back with daily activities. Recommend patient continue with skilled PT services.  Evaluation/Treatment Tolerance: Patient tolerated treatment well    Patient will continue to benefit from skilled outpatient physical therapy to address the deficits listed in the problem list box on initial evaluation, provide pt/family education and to maximize pt's level of independence in the home and community environment.     Patient's spiritual, cultural, and educational needs considered and patient agreeable to plan of care and goals.     Education  Education was done with Patient. The patient's learning style includes Listening. The patient Verbalizes understanding and Demonstrates understanding.         - Recommended arch supports to improve alignment of ankle and knee joints to improve WB surface on knee joints to facilitate reduction of knee pain. - Written HEP issued with exercises and stretches demonstrated and performed - importance and benefits of performing HEP daily for optimal improvements even after discharged from PT. - PT will focus on stretching and strengthening muscles that surround knee joint and lumbar spine, however will not change the degenerative changes in the knee joint that cause pain         Plan: Continue with current plan of care and progress as tolerated.    Goals:   Active       PT goals:        Reduce c/o pain in L knee and lumbar spine to <4/10 to improve tolerance with daily activities (Progressing)       Start:  04/28/25    Expected End:  07/25/25            Improve strength in B LE to 4+/5 MMT throughout in order to improve tolerance with overall functional mobility  (Progressing)        Start:  04/28/25    Expected End:  07/25/25            Improve flexibility of B LE in order to reduce mechanical stressors on the lumbar spine and Left knee (Progressing)       Start:  04/28/25    Expected End:  07/25/25             Pt will be able to stand / walk for >30 minutes with < 4/10 pain level in order to complete daily activities including grocery shopping, household chores, etc (Progressing)       Start:  04/28/25    Expected End:  07/25/25             Pt will demonstrate proper bending and lifting techniques to reduce stress on lumbar spine (Progressing)       Start:  04/28/25    Expected End:  07/25/25               continued PT goals:        Pt will demonstrate knowledge and independence with HEP to continue with exercises outside of therapy to facilitate optimal overall improvements (Progressing)       Start:  04/28/25    Expected End:  07/25/25            Pt will improve functional score on lumbar FOTO by >20 points which indicates improved ability to perform daily tasks with less difficulty and pain (Progressing)       Start:  04/28/25    Expected End:  07/25/25             Pt will purchase arch supports for shoes to improve foot and knee alignment to reduce knee pain  (Progressing)       Start:  04/28/25    Expected End:  07/25/25                Jenny Mills PTA

## 2025-05-16 ENCOUNTER — CLINICAL SUPPORT (OUTPATIENT)
Dept: REHABILITATION | Facility: HOSPITAL | Age: 35
End: 2025-05-16
Attending: NURSE PRACTITIONER
Payer: MEDICAID

## 2025-05-16 DIAGNOSIS — M22.2X9 PATELLOFEMORAL STRESS SYNDROME, UNSPECIFIED LATERALITY: ICD-10-CM

## 2025-05-16 DIAGNOSIS — M21.41 BILATERAL PES PLANUS: ICD-10-CM

## 2025-05-16 DIAGNOSIS — M54.50 CHRONIC BILATERAL LOW BACK PAIN, UNSPECIFIED WHETHER SCIATICA PRESENT: ICD-10-CM

## 2025-05-16 DIAGNOSIS — E66.01 MORBID OBESITY: Primary | ICD-10-CM

## 2025-05-16 DIAGNOSIS — M25.562 CHRONIC PAIN OF LEFT KNEE: ICD-10-CM

## 2025-05-16 DIAGNOSIS — G89.29 CHRONIC BILATERAL LOW BACK PAIN, UNSPECIFIED WHETHER SCIATICA PRESENT: ICD-10-CM

## 2025-05-16 DIAGNOSIS — R29.898 DECREASED STRENGTH INVOLVING KNEE JOINT: ICD-10-CM

## 2025-05-16 DIAGNOSIS — M17.12 PRIMARY OSTEOARTHRITIS OF LEFT KNEE: ICD-10-CM

## 2025-05-16 DIAGNOSIS — G89.29 CHRONIC PAIN OF LEFT KNEE: ICD-10-CM

## 2025-05-16 DIAGNOSIS — R29.898 IMPAIRED FLEXIBILITY OF LOWER EXTREMITY: ICD-10-CM

## 2025-05-16 DIAGNOSIS — M21.42 BILATERAL PES PLANUS: ICD-10-CM

## 2025-05-16 PROCEDURE — 97112 NEUROMUSCULAR REEDUCATION: CPT | Mod: CQ

## 2025-05-16 PROCEDURE — 97110 THERAPEUTIC EXERCISES: CPT | Mod: CQ

## 2025-05-16 NOTE — PROGRESS NOTES
Outpatient Rehab    Physical Therapy Visit    Patient Name: Nikky Roman  MRN: [id number]  YOB: 1990  Encounter Date: 5/16/2025    Therapy Diagnosis:   Encounter Diagnoses   Name Primary?    Morbid obesity Yes    Chronic bilateral low back pain, unspecified whether sciatica present     Chronic pain of left knee     Impaired flexibility of lower extremity     Decreased strength involving knee joint     Bilateral pes planus     Patellofemoral stress syndrome, unspecified laterality     Primary osteoarthritis of left knee      Physician: Vianey Banks NP    Physician Orders: Eval and Treat  Medical Diagnosis: Low back pain, unspecified  Unilateral primary osteoarthritis, left knee    Visit # / Visits Authorized:  4 / 12  Insurance Authorization Period: 4/30/2025 to 6/29/2025  Date of Evaluation: 4/28/2025  Plan of Care Certification: 4/28/2025 to 7/18/2025      PT/PTA: PTA   Number of PTA visits since last PT visit:4  Time In: 1557   Time Out: 1630  Total Time (in minutes): 33   Total Billable Time (in minutes): 33    FOTO:  Intake Score:  %  Survey Score 2:  %  Survey Score 3:  %    Precautions:       Subjective   Patient reported feeling lightheaded and weak today. Pain level continues at 9/10 in the lower back and 5/10 in the left knee. Patient stated all she normally does is lay down in the bed because she is anemic..  Pain reported as 9/10. LB and L knee    Objective            Treatment:  Therapeutic Exercise  TE 1: standing hip 3 way: flexion, abduction, extension with red TB for bilateral hip strength and knee stability  TE 2: standing heel and toe raises  TE 3: steps (6inch): forward, lateral, and backward for glute strengthening and left knee stability  TE 4: lumbar flexion with physio ball for regaining pain free ROM and core re-ed for proper engagement  Balance/Neuromuscular Re-Education  NMR 2: core re-ed: performed during lumbar flexion and seated pelvic mobility on disc (in all  planes)  NMR 3: quad re-ed: standing TKE with blue theraband  NMR 4: thoracolumbar re-ed: chair extension with static hold (pt reported relief)    Time Entry(in minutes):  Neuromuscular Re-Education Time Entry: 10  Therapeutic Exercise Time Entry: 23    Assessment & Plan   Assessment: Nikky demonstrated a fair response after today's PT session as evidenced by continued report in lower back and knee. Decreased activity tolerance today due to report of weakness and lightheadedness. Continued focus on strengthening glute medius exercises and quad strength with good response. Educated patient on importance of performing HEP at home to address deficits mentioned above. Continued deficits with gait instability, left quad weakness with decreased stability, core weakness, and increased pain in lower back with daily activities. Limited pelvic mobility noted and report of stiffness during seated pelvic mobs on disc. Recommend patient continue with skilled PT services.  Evaluation/Treatment Tolerance: Patient tolerated treatment well    Patient will continue to benefit from skilled outpatient physical therapy to address the deficits listed in the problem list box on initial evaluation, provide pt/family education and to maximize pt's level of independence in the home and community environment.     Patient's spiritual, cultural, and educational needs considered and patient agreeable to plan of care and goals.     Education  Education was done with Patient. The patient's learning style includes Listening. The patient Verbalizes understanding.         - Recommended arch supports to improve alignment of ankle and knee joints to improve WB surface on knee joints to facilitate reduction of knee pain. - Written HEP issued with exercises and stretches demonstrated and performed - importance and benefits of performing HEP daily for optimal improvements even after discharged from PT. - PT will focus on stretching and strengthening  muscles that surround knee joint and lumbar spine, however will not change the degenerative changes in the knee joint that cause pain         Plan: Continue with current plan of care and progress as tolerated.    Goals:   Active       PT goals:        Reduce c/o pain in L knee and lumbar spine to <4/10 to improve tolerance with daily activities (Progressing)       Start:  04/28/25    Expected End:  07/25/25            Improve strength in B LE to 4+/5 MMT throughout in order to improve tolerance with overall functional mobility  (Progressing)       Start:  04/28/25    Expected End:  07/25/25            Improve flexibility of B LE in order to reduce mechanical stressors on the lumbar spine and Left knee (Progressing)       Start:  04/28/25    Expected End:  07/25/25             Pt will be able to stand / walk for >30 minutes with < 4/10 pain level in order to complete daily activities including grocery shopping, household chores, etc (Progressing)       Start:  04/28/25    Expected End:  07/25/25             Pt will demonstrate proper bending and lifting techniques to reduce stress on lumbar spine (Progressing)       Start:  04/28/25    Expected End:  07/25/25               continued PT goals:        Pt will demonstrate knowledge and independence with HEP to continue with exercises outside of therapy to facilitate optimal overall improvements (Progressing)       Start:  04/28/25    Expected End:  07/25/25            Pt will improve functional score on lumbar FOTO by >20 points which indicates improved ability to perform daily tasks with less difficulty and pain (Progressing)       Start:  04/28/25    Expected End:  07/25/25             Pt will purchase arch supports for shoes to improve foot and knee alignment to reduce knee pain  (Progressing)       Start:  04/28/25    Expected End:  07/25/25                Jenny Mills PTA

## 2025-05-20 ENCOUNTER — CLINICAL SUPPORT (OUTPATIENT)
Dept: REHABILITATION | Facility: HOSPITAL | Age: 35
End: 2025-05-20
Attending: NURSE PRACTITIONER
Payer: MEDICAID

## 2025-05-20 DIAGNOSIS — M21.42 BILATERAL PES PLANUS: ICD-10-CM

## 2025-05-20 DIAGNOSIS — M21.41 BILATERAL PES PLANUS: ICD-10-CM

## 2025-05-20 DIAGNOSIS — G89.29 CHRONIC PAIN OF LEFT KNEE: ICD-10-CM

## 2025-05-20 DIAGNOSIS — R29.898 IMPAIRED FLEXIBILITY OF LOWER EXTREMITY: ICD-10-CM

## 2025-05-20 DIAGNOSIS — G89.29 CHRONIC BILATERAL LOW BACK PAIN, UNSPECIFIED WHETHER SCIATICA PRESENT: ICD-10-CM

## 2025-05-20 DIAGNOSIS — M25.562 CHRONIC PAIN OF LEFT KNEE: ICD-10-CM

## 2025-05-20 DIAGNOSIS — R29.898 DECREASED STRENGTH INVOLVING KNEE JOINT: ICD-10-CM

## 2025-05-20 DIAGNOSIS — E66.01 MORBID OBESITY: Primary | ICD-10-CM

## 2025-05-20 DIAGNOSIS — M54.50 CHRONIC BILATERAL LOW BACK PAIN, UNSPECIFIED WHETHER SCIATICA PRESENT: ICD-10-CM

## 2025-05-20 DIAGNOSIS — M22.2X9 PATELLOFEMORAL STRESS SYNDROME, UNSPECIFIED LATERALITY: ICD-10-CM

## 2025-05-20 DIAGNOSIS — M17.12 PRIMARY OSTEOARTHRITIS OF LEFT KNEE: ICD-10-CM

## 2025-05-20 PROCEDURE — 97140 MANUAL THERAPY 1/> REGIONS: CPT | Mod: CQ

## 2025-05-20 PROCEDURE — 97112 NEUROMUSCULAR REEDUCATION: CPT | Mod: CQ

## 2025-05-20 PROCEDURE — 97110 THERAPEUTIC EXERCISES: CPT | Mod: CQ

## 2025-05-20 NOTE — PROGRESS NOTES
Outpatient Rehab    Physical Therapy Visit    Patient Name: Nikky Roman  MRN: [id number]  YOB: 1990  Encounter Date: 5/20/2025    Therapy Diagnosis:   Encounter Diagnoses   Name Primary?    Morbid obesity Yes    Chronic bilateral low back pain, unspecified whether sciatica present     Chronic pain of left knee     Impaired flexibility of lower extremity     Decreased strength involving knee joint     Bilateral pes planus     Patellofemoral stress syndrome, unspecified laterality     Primary osteoarthritis of left knee      Physician: Vianey Banks NP    Physician Orders: Eval and Treat  Medical Diagnosis: Low back pain, unspecified  Unilateral primary osteoarthritis, left knee    Visit # / Visits Authorized:  5 / 12  Insurance Authorization Period: 4/30/2025 to 6/29/2025  Date of Evaluation: 4/28/2025  Plan of Care Certification: 4/28/2025 to 7/18/2025      PT/PTA: PTA   Number of PTA visits since last PT visit:5  Time In: 1250   Time Out: 1330  Total Time (in minutes): 40   Total Billable Time (in minutes): 40    FOTO:  Intake Score:  %  Survey Score 2:  %  Survey Score 3:  %    Precautions:       Subjective   patient reported increased swelling in both ankles. reported she has not done anything different than her usual daily activities. pain level remains unchanged in lower back and left knee..  Pain reported as 9/10. LB and L knee    Objective            Treatment:  Therapeutic Exercise  TE 1: standing hip 3 way: abduction, extension with red TB for bilateral hip strength and knee stability  TE 6: bilateral S/L clamshells with green TB, 2 x 10 reps  TE 7: prone modified press ups and full range press ups (report of no change in pain)  TE 8: quadruped: cat cow exercises for thoracolumbar mobility with minimal tactile cues at lumbar PS  TE 9: prone HS curls with #3, 2 x 10 reps  Manual Therapy  MT 2: L LE LAD, L knee Gr II A/P mobilizations  Balance/Neuromuscular Re-Education  NMR 1: Glute  re-ed: double limb bridges  NMR 2: core re-ed: posterior pelvic tilts, dying bug march, seated pelvic mobility on disc (in all planes)  NMR 3: quad re-ed: seated LAQ with #2  NMR 4: thoracolumbar re-ed: chair extension with static hold (pt reported relief)    Time Entry(in minutes):  Manual Therapy Time Entry: 10  Neuromuscular Re-Education Time Entry: 10  Therapeutic Exercise Time Entry: 20    Assessment & Plan   Assessment: Nikky continues to present to clinic with 9/10 and 5/10 pain level in lower back and left knee. Decreased activity tolerance today due to report of weakness and lightheadedness. Continued focus on strengthening glute medius exercises and quad strength with good response. Continued deficits with gait instability, left quad weakness with decreased stability, core weakness, and increased pain in lower back with daily activities. Slight improvement with thoracolumbar mobility during chair extension exercise. Mild relief following left knee distraction. Recommend patient continue with skilled PT services.  Evaluation/Treatment Tolerance: Patient tolerated treatment well    Patient will continue to benefit from skilled outpatient physical therapy to address the deficits listed in the problem list box on initial evaluation, provide pt/family education and to maximize pt's level of independence in the home and community environment.     Patient's spiritual, cultural, and educational needs considered and patient agreeable to plan of care and goals.     Education  Education was done with Patient. The patient's learning style includes Listening. The patient Verbalizes understanding.         - Recommended arch supports to improve alignment of ankle and knee joints to improve WB surface on knee joints to facilitate reduction of knee pain. - Written HEP issued with exercises and stretches demonstrated and performed - importance and benefits of performing HEP daily for optimal improvements even after  discharged from PT. - PT will focus on stretching and strengthening muscles that surround knee joint and lumbar spine, however will not change the degenerative changes in the knee joint that cause pain         Plan: Continue with current plan of care and progress as tolerated.    Goals:   Active       PT goals:        Reduce c/o pain in L knee and lumbar spine to <4/10 to improve tolerance with daily activities (Progressing)       Start:  04/28/25    Expected End:  07/25/25            Improve strength in B LE to 4+/5 MMT throughout in order to improve tolerance with overall functional mobility  (Progressing)       Start:  04/28/25    Expected End:  07/25/25            Improve flexibility of B LE in order to reduce mechanical stressors on the lumbar spine and Left knee (Progressing)       Start:  04/28/25    Expected End:  07/25/25             Pt will be able to stand / walk for >30 minutes with < 4/10 pain level in order to complete daily activities including grocery shopping, household chores, etc (Progressing)       Start:  04/28/25    Expected End:  07/25/25             Pt will demonstrate proper bending and lifting techniques to reduce stress on lumbar spine (Progressing)       Start:  04/28/25    Expected End:  07/25/25               continued PT goals:        Pt will demonstrate knowledge and independence with HEP to continue with exercises outside of therapy to facilitate optimal overall improvements (Progressing)       Start:  04/28/25    Expected End:  07/25/25            Pt will improve functional score on lumbar FOTO by >20 points which indicates improved ability to perform daily tasks with less difficulty and pain (Progressing)       Start:  04/28/25    Expected End:  07/25/25             Pt will purchase arch supports for shoes to improve foot and knee alignment to reduce knee pain  (Progressing)       Start:  04/28/25    Expected End:  07/25/25                Jenny Mills PTA

## 2025-05-27 ENCOUNTER — CLINICAL SUPPORT (OUTPATIENT)
Dept: REHABILITATION | Facility: HOSPITAL | Age: 35
End: 2025-05-27
Attending: NURSE PRACTITIONER
Payer: MEDICAID

## 2025-05-27 ENCOUNTER — DOCUMENTATION ONLY (OUTPATIENT)
Dept: REHABILITATION | Facility: HOSPITAL | Age: 35
End: 2025-05-27
Payer: MEDICAID

## 2025-05-27 DIAGNOSIS — R29.898 DECREASED STRENGTH INVOLVING KNEE JOINT: ICD-10-CM

## 2025-05-27 DIAGNOSIS — M25.562 CHRONIC PAIN OF LEFT KNEE: ICD-10-CM

## 2025-05-27 DIAGNOSIS — M21.42 BILATERAL PES PLANUS: ICD-10-CM

## 2025-05-27 DIAGNOSIS — M21.41 BILATERAL PES PLANUS: ICD-10-CM

## 2025-05-27 DIAGNOSIS — M22.2X9 PATELLOFEMORAL STRESS SYNDROME, UNSPECIFIED LATERALITY: ICD-10-CM

## 2025-05-27 DIAGNOSIS — M54.50 CHRONIC BILATERAL LOW BACK PAIN, UNSPECIFIED WHETHER SCIATICA PRESENT: ICD-10-CM

## 2025-05-27 DIAGNOSIS — M17.12 PRIMARY OSTEOARTHRITIS OF LEFT KNEE: ICD-10-CM

## 2025-05-27 DIAGNOSIS — E66.01 MORBID OBESITY: Primary | ICD-10-CM

## 2025-05-27 DIAGNOSIS — G89.29 CHRONIC BILATERAL LOW BACK PAIN, UNSPECIFIED WHETHER SCIATICA PRESENT: ICD-10-CM

## 2025-05-27 DIAGNOSIS — G89.29 CHRONIC PAIN OF LEFT KNEE: ICD-10-CM

## 2025-05-27 DIAGNOSIS — R29.898 IMPAIRED FLEXIBILITY OF LOWER EXTREMITY: ICD-10-CM

## 2025-05-27 PROCEDURE — 97140 MANUAL THERAPY 1/> REGIONS: CPT

## 2025-05-27 PROCEDURE — 97110 THERAPEUTIC EXERCISES: CPT

## 2025-05-27 PROCEDURE — 97112 NEUROMUSCULAR REEDUCATION: CPT

## 2025-05-27 NOTE — PROGRESS NOTES
Outpatient Rehab    Physical Therapy Progress Note    Patient Name: Nikky Roman  MRN: [id number]  YOB: 1990  Encounter Date: 5/27/2025    Therapy Diagnosis:   Encounter Diagnoses   Name Primary?    Morbid obesity Yes    Chronic bilateral low back pain, unspecified whether sciatica present     Chronic pain of left knee     Impaired flexibility of lower extremity     Decreased strength involving knee joint     Bilateral pes planus     Patellofemoral stress syndrome, unspecified laterality     Primary osteoarthritis of left knee      Physician: Vianey Banks NP    Physician Orders: Eval and Treat  Medical Diagnosis: Low back pain, unspecified  Unilateral primary osteoarthritis, left knee    Visit # / Visits Authorized:  6 / 12  Insurance Authorization Period: 4/30/2025 to 6/29/2025  Date of Evaluation: 4/28/2025  Plan of Care Certification: 4/28/2025 to 7/18/2025      PT/PTA: PT   Number of PTA visits since last PT visit:5  Time In: 1250   Time Out: 1330  Total Time (in minutes): 40     Subjective   The pt states she is feeling okay with ct L knee and lower back pain..  Pain reported as 8/10. Lower back and 4/10 to L knee    Objective      Spinal Mobility  Hypomobile: Thoracic and Lumbosacral  Lumbosacral Mobility Details: L3-L5    Vertebral Palpation       Increased tenderness with PA glides to lower lumbar spinous process and B lumbar paraspinal muscles          Hip Range of Motion    Sciatic neural tension (B) (L>R). Mod tightness of (B) HS and adductors; Thoracic rotation AROM: R=75% of WFL and L= WFL              Hip Strength - Planes of Motion   Right Strength Right Pain Left Strength Left  Pain   Flexion (L2) 4   4-     Extension           ABduction 4   4     ADduction 4   4     Internal Rotation           External Rotation 4+   4+                Treatment:  Therapeutic Exercise  TE 1: hip stability and core strength with quadruped hip abd with SB  TE 2: retractions and lat pull downs  with Blue TB resistance while seated on SB for core/pelvic awareness and engagement  Manual Therapy  MT 1: (B) sciatic and femoral nerve glides  MT 2: supine lumbar traction over SB with belt  MT 3: (B) hip add, piri, and HS stretch  MT 4: prone sacral mobs  Balance/Neuromuscular Re-Education  NMR 1: core and pelvis re-ed with quadruped ant/post pelvic tilt with SB  NMR 2: core and posterior chain re-ed with UE only superman with SB    Time Entry(in minutes):  Manual Therapy Time Entry: 15  Neuromuscular Re-Education Time Entry: 10  Therapeutic Exercise Time Entry: 15    Assessment & Plan   Assessment: The pt ct to have high reports of lower back pain (8/10 today) since her SOC with PT. Today is her 6th PT session overall since her IE on 04/28/25. Tx today consisted of core/hip stability interventions such as quadruped positioning with SB under pelvis for cue to this area about the amount of pressure put into ball with certain movements. She also complained of foot pain. She was educated on the importance of compliance with HEP as well as PT sessions.       The patient will continue to benefit from skilled outpatient physical therapy in order to address the deficits listed in the problem list on the initial evaluation, provide patient and family education, and maximize the patients level of independence in the home and community environments.     The patient's spiritual, cultural, and educational needs were considered, and the patient is agreeable to the plan of care and goals.           Plan: Ct with current PT POC.    Goals:   Active       PT goals:        Reduce c/o pain in L knee and lumbar spine to <4/10 to improve tolerance with daily activities (Progressing)       Start:  04/28/25    Expected End:  07/25/25            Improve strength in B LE to 4+/5 MMT throughout in order to improve tolerance with overall functional mobility  (Progressing)       Start:  04/28/25    Expected End:  07/25/25             Improve flexibility of B LE in order to reduce mechanical stressors on the lumbar spine and Left knee (Progressing)       Start:  04/28/25    Expected End:  07/25/25             Pt will be able to stand / walk for >30 minutes with < 4/10 pain level in order to complete daily activities including grocery shopping, household chores, etc (Progressing)       Start:  04/28/25    Expected End:  07/25/25             Pt will demonstrate proper bending and lifting techniques to reduce stress on lumbar spine (Progressing)       Start:  04/28/25    Expected End:  07/25/25               continued PT goals:        Pt will demonstrate knowledge and independence with HEP to continue with exercises outside of therapy to facilitate optimal overall improvements (Progressing)       Start:  04/28/25    Expected End:  07/25/25            Pt will improve functional score on lumbar FOTO by >20 points which indicates improved ability to perform daily tasks with less difficulty and pain (Progressing)       Start:  04/28/25    Expected End:  07/25/25             Pt will purchase arch supports for shoes to improve foot and knee alignment to reduce knee pain  (Progressing)       Start:  04/28/25    Expected End:  07/25/25                David Mosquera, PT

## 2025-05-29 ENCOUNTER — HOSPITAL ENCOUNTER (OUTPATIENT)
Dept: RADIOLOGY | Facility: HOSPITAL | Age: 35
Discharge: HOME | End: 2025-05-29
Attending: OBSTETRICS & GYNECOLOGY
Payer: MEDICAID

## 2025-05-29 ENCOUNTER — CLINICAL SUPPORT (OUTPATIENT)
Dept: REHABILITATION | Facility: HOSPITAL | Age: 35
End: 2025-05-29
Attending: NURSE PRACTITIONER
Payer: MEDICAID

## 2025-05-29 DIAGNOSIS — G89.29 CHRONIC BILATERAL LOW BACK PAIN, UNSPECIFIED WHETHER SCIATICA PRESENT: Primary | ICD-10-CM

## 2025-05-29 DIAGNOSIS — R29.898 IMPAIRED FLEXIBILITY OF LOWER EXTREMITY: ICD-10-CM

## 2025-05-29 DIAGNOSIS — M17.12 PRIMARY OSTEOARTHRITIS OF LEFT KNEE: ICD-10-CM

## 2025-05-29 DIAGNOSIS — M21.42 BILATERAL PES PLANUS: ICD-10-CM

## 2025-05-29 DIAGNOSIS — N93.9 ABNORMAL UTERINE BLEEDING (AUB): ICD-10-CM

## 2025-05-29 DIAGNOSIS — E66.01 MORBID OBESITY: ICD-10-CM

## 2025-05-29 DIAGNOSIS — R29.898 DECREASED STRENGTH INVOLVING KNEE JOINT: ICD-10-CM

## 2025-05-29 DIAGNOSIS — G89.29 CHRONIC PAIN OF LEFT KNEE: ICD-10-CM

## 2025-05-29 DIAGNOSIS — M25.562 CHRONIC PAIN OF LEFT KNEE: ICD-10-CM

## 2025-05-29 DIAGNOSIS — M21.41 BILATERAL PES PLANUS: ICD-10-CM

## 2025-05-29 DIAGNOSIS — M54.50 CHRONIC BILATERAL LOW BACK PAIN, UNSPECIFIED WHETHER SCIATICA PRESENT: Primary | ICD-10-CM

## 2025-05-29 DIAGNOSIS — M22.2X9 PATELLOFEMORAL STRESS SYNDROME, UNSPECIFIED LATERALITY: ICD-10-CM

## 2025-05-29 PROCEDURE — 97110 THERAPEUTIC EXERCISES: CPT

## 2025-05-29 PROCEDURE — 76856 US EXAM PELVIC COMPLETE: CPT | Mod: TC

## 2025-05-29 NOTE — PROGRESS NOTES
Outpatient Rehab    Physical Therapy Visit    Patient Name: Nikky Roman  MRN: [id number]  YOB: 1990  Encounter Date: 5/29/2025    Therapy Diagnosis:   Encounter Diagnoses   Name Primary?    Morbid obesity     Chronic bilateral low back pain, unspecified whether sciatica present Yes    Chronic pain of left knee     Impaired flexibility of lower extremity     Decreased strength involving knee joint     Bilateral pes planus     Patellofemoral stress syndrome, unspecified laterality     Primary osteoarthritis of left knee      Physician: Vianey Banks NP    Physician Orders: Eval and Treat  Medical Diagnosis: Low back pain, unspecified  Unilateral primary osteoarthritis, left knee    Visit # / Visits Authorized:  7 / 12  Insurance Authorization Period: 4/30/2025 to 6/29/2025  Date of Evaluation: 4/28/2025  Plan of Care Certification: 4/28/2025 to 7/18/2025   RA completed: 5/26/25     PT/PTA: PT   Number of PTA visits since last PT visit:0  Time In: 0805   Time Out: 0845  Total Time (in minutes): 40   Total Billable Time (in minutes):      FOTO:  Intake Score:  %  Survey Score 2:  %  Survey Score 3:  %    Precautions:       Subjective   pt reports severe pain in low back as well as pain in L knee, B ankles with Ibuprofen 800mg taken earlier today. she says she has chronic pain so does not expect her pain to get better. laying down helps reduce her pain a little. She performs a few exercises from [facility].  Pain reported as 9/10. 9/10- LBP, 6/10- knees, ankles    Objective            Treatment:  Therapeutic Exercise  TE 1: warm-up: NuStep UE and LE (see exercise sheet)  TE 2: B LE stretches (see exercise sheet)  TE 3: lumbar mobility / ROM (see exercise sheet)  TE 4: core strengthening exercises (see exercise sheet)  TE 5: B hip strengthening exercises (see exercise sheet)  TE 6: sit to stands (see exercise sheet)      Time Entry(in minutes):  Therapeutic Exercise Time Entry: 45    Assessment & Plan    Assessment: Pt performed all core and LE strengthening exercises, stretches, and ROM / mobility exercises with good effort with no c/o increased pain despite reported high pain level throughout her body with continued cues required for proper technique to isolate specific muscles. pt continues with deficits causing pain  Evaluation/Treatment Tolerance: Patient tolerated treatment well    The patient will continue to benefit from skilled outpatient physical therapy in order to address the deficits listed in the problem list on the initial evaluation, provide patient and family education, and maximize the patients level of independence in the home and community environments.     The patient's spiritual, cultural, and educational needs were considered, and the patient is agreeable to the plan of care and goals.           Plan: continue with current POC and progress per pt's tolerance    Goals:   Active       PT goals:        Reduce c/o pain in L knee and lumbar spine to <4/10 to improve tolerance with daily activities (Progressing)       Start:  04/28/25    Expected End:  07/25/25            Improve strength in B LE to 4+/5 MMT throughout in order to improve tolerance with overall functional mobility  (Progressing)       Start:  04/28/25    Expected End:  07/25/25            Improve flexibility of B LE in order to reduce mechanical stressors on the lumbar spine and Left knee (Progressing)       Start:  04/28/25    Expected End:  07/25/25             Pt will be able to stand / walk for >30 minutes with < 4/10 pain level in order to complete daily activities including grocery shopping, household chores, etc (Progressing)       Start:  04/28/25    Expected End:  07/25/25             Pt will demonstrate proper bending and lifting techniques to reduce stress on lumbar spine (Progressing)       Start:  04/28/25    Expected End:  07/25/25               continued PT goals:        Pt will demonstrate knowledge and  independence with HEP to continue with exercises outside of therapy to facilitate optimal overall improvements (Progressing)       Start:  04/28/25    Expected End:  07/25/25            Pt will improve functional score on lumbar FOTO by >20 points which indicates improved ability to perform daily tasks with less difficulty and pain (Progressing)       Start:  04/28/25    Expected End:  07/25/25             Pt will purchase arch supports for shoes to improve foot and knee alignment to reduce knee pain  (Progressing)       Start:  04/28/25    Expected End:  07/25/25                Kayla Macrum, PT

## 2025-06-04 ENCOUNTER — LAB VISIT (OUTPATIENT)
Dept: LAB | Facility: HOSPITAL | Age: 35
End: 2025-06-04
Attending: OBSTETRICS & GYNECOLOGY
Payer: MEDICAID

## 2025-06-04 DIAGNOSIS — Z01.419 GYNECOLOGIC EXAM NORMAL: ICD-10-CM

## 2025-06-04 LAB
BASOPHILS # BLD AUTO: 0.03 X10(3)/MCL
BASOPHILS NFR BLD AUTO: 0.3 %
EOSINOPHIL # BLD AUTO: 0.09 X10(3)/MCL (ref 0–0.9)
EOSINOPHIL NFR BLD AUTO: 1 %
ERYTHROCYTE [DISTWIDTH] IN BLOOD BY AUTOMATED COUNT: 14.1 % (ref 11.5–17)
HCT VFR BLD AUTO: 32.6 % (ref 37–47)
HGB BLD-MCNC: 10.4 G/DL (ref 12–16)
IMM GRANULOCYTES # BLD AUTO: 0.01 X10(3)/MCL (ref 0–0.04)
IMM GRANULOCYTES NFR BLD AUTO: 0.1 %
LYMPHOCYTES # BLD AUTO: 1.92 X10(3)/MCL (ref 0.6–4.6)
LYMPHOCYTES NFR BLD AUTO: 21.4 %
MCH RBC QN AUTO: 27.8 PG (ref 27–31)
MCHC RBC AUTO-ENTMCNC: 31.9 G/DL (ref 33–36)
MCV RBC AUTO: 87.2 FL (ref 80–94)
MONOCYTES # BLD AUTO: 0.37 X10(3)/MCL (ref 0.1–1.3)
MONOCYTES NFR BLD AUTO: 4.1 %
NEUTROPHILS # BLD AUTO: 6.54 X10(3)/MCL (ref 2.1–9.2)
NEUTROPHILS NFR BLD AUTO: 73.1 %
PLATELET # BLD AUTO: 268 X10(3)/MCL (ref 130–400)
PMV BLD AUTO: 11.2 FL (ref 7.4–10.4)
RBC # BLD AUTO: 3.74 X10(6)/MCL (ref 4.2–5.4)
T4 FREE SERPL-MCNC: 0.96 NG/DL (ref 0.7–1.48)
TSH SERPL-ACNC: 2.43 UIU/ML (ref 0.35–4.94)
WBC # BLD AUTO: 8.96 X10(3)/MCL (ref 4.5–11.5)

## 2025-06-04 PROCEDURE — 36415 COLL VENOUS BLD VENIPUNCTURE: CPT

## 2025-06-04 PROCEDURE — 84439 ASSAY OF FREE THYROXINE: CPT

## 2025-06-04 PROCEDURE — 85025 COMPLETE CBC W/AUTO DIFF WBC: CPT

## 2025-06-04 PROCEDURE — 84443 ASSAY THYROID STIM HORMONE: CPT

## 2025-06-05 ENCOUNTER — CLINICAL SUPPORT (OUTPATIENT)
Dept: REHABILITATION | Facility: HOSPITAL | Age: 35
End: 2025-06-05
Attending: NURSE PRACTITIONER
Payer: MEDICAID

## 2025-06-05 DIAGNOSIS — M17.12 PRIMARY OSTEOARTHRITIS OF LEFT KNEE: ICD-10-CM

## 2025-06-05 DIAGNOSIS — G89.29 CHRONIC BILATERAL LOW BACK PAIN, UNSPECIFIED WHETHER SCIATICA PRESENT: ICD-10-CM

## 2025-06-05 DIAGNOSIS — E66.01 MORBID OBESITY: Primary | ICD-10-CM

## 2025-06-05 DIAGNOSIS — G89.29 CHRONIC PAIN OF LEFT KNEE: ICD-10-CM

## 2025-06-05 DIAGNOSIS — M54.50 CHRONIC BILATERAL LOW BACK PAIN, UNSPECIFIED WHETHER SCIATICA PRESENT: ICD-10-CM

## 2025-06-05 DIAGNOSIS — M21.41 BILATERAL PES PLANUS: ICD-10-CM

## 2025-06-05 DIAGNOSIS — M25.562 CHRONIC PAIN OF LEFT KNEE: ICD-10-CM

## 2025-06-05 DIAGNOSIS — R29.898 IMPAIRED FLEXIBILITY OF LOWER EXTREMITY: ICD-10-CM

## 2025-06-05 DIAGNOSIS — M21.42 BILATERAL PES PLANUS: ICD-10-CM

## 2025-06-05 DIAGNOSIS — M22.2X9 PATELLOFEMORAL STRESS SYNDROME, UNSPECIFIED LATERALITY: ICD-10-CM

## 2025-06-05 DIAGNOSIS — R29.898 DECREASED STRENGTH INVOLVING KNEE JOINT: ICD-10-CM

## 2025-06-05 PROCEDURE — 97110 THERAPEUTIC EXERCISES: CPT | Mod: CQ

## 2025-06-05 PROCEDURE — 97140 MANUAL THERAPY 1/> REGIONS: CPT | Mod: CQ

## 2025-06-05 PROCEDURE — 97112 NEUROMUSCULAR REEDUCATION: CPT | Mod: CQ

## 2025-06-12 ENCOUNTER — CLINICAL SUPPORT (OUTPATIENT)
Dept: REHABILITATION | Facility: HOSPITAL | Age: 35
End: 2025-06-12
Attending: NURSE PRACTITIONER
Payer: MEDICAID

## 2025-06-12 ENCOUNTER — INFUSION (OUTPATIENT)
Dept: INFUSION THERAPY | Facility: HOSPITAL | Age: 35
End: 2025-06-12
Attending: OBSTETRICS & GYNECOLOGY
Payer: MEDICAID

## 2025-06-12 VITALS
SYSTOLIC BLOOD PRESSURE: 151 MMHG | HEART RATE: 86 BPM | BODY MASS INDEX: 50.21 KG/M2 | OXYGEN SATURATION: 98 % | HEIGHT: 60 IN | TEMPERATURE: 98 F | WEIGHT: 255.75 LBS | DIASTOLIC BLOOD PRESSURE: 88 MMHG | RESPIRATION RATE: 18 BRPM

## 2025-06-12 DIAGNOSIS — M54.50 CHRONIC BILATERAL LOW BACK PAIN, UNSPECIFIED WHETHER SCIATICA PRESENT: ICD-10-CM

## 2025-06-12 DIAGNOSIS — R29.898 DECREASED STRENGTH INVOLVING KNEE JOINT: ICD-10-CM

## 2025-06-12 DIAGNOSIS — M17.12 PRIMARY OSTEOARTHRITIS OF LEFT KNEE: ICD-10-CM

## 2025-06-12 DIAGNOSIS — E66.01 MORBID OBESITY: Primary | ICD-10-CM

## 2025-06-12 DIAGNOSIS — G89.29 CHRONIC PAIN OF LEFT KNEE: ICD-10-CM

## 2025-06-12 DIAGNOSIS — Z23 NEED FOR VACCINATION: Primary | ICD-10-CM

## 2025-06-12 DIAGNOSIS — G89.29 CHRONIC BILATERAL LOW BACK PAIN, UNSPECIFIED WHETHER SCIATICA PRESENT: ICD-10-CM

## 2025-06-12 DIAGNOSIS — M25.562 CHRONIC PAIN OF LEFT KNEE: ICD-10-CM

## 2025-06-12 DIAGNOSIS — M21.41 BILATERAL PES PLANUS: ICD-10-CM

## 2025-06-12 DIAGNOSIS — M21.42 BILATERAL PES PLANUS: ICD-10-CM

## 2025-06-12 DIAGNOSIS — R29.898 IMPAIRED FLEXIBILITY OF LOWER EXTREMITY: ICD-10-CM

## 2025-06-12 DIAGNOSIS — M22.2X9 PATELLOFEMORAL STRESS SYNDROME, UNSPECIFIED LATERALITY: ICD-10-CM

## 2025-06-12 PROCEDURE — 90471 IMMUNIZATION ADMIN: CPT | Performed by: OBSTETRICS & GYNECOLOGY

## 2025-06-12 PROCEDURE — 97110 THERAPEUTIC EXERCISES: CPT

## 2025-06-12 PROCEDURE — 97140 MANUAL THERAPY 1/> REGIONS: CPT

## 2025-06-12 PROCEDURE — 63600175 PHARM REV CODE 636 W HCPCS: Performed by: OBSTETRICS & GYNECOLOGY

## 2025-06-12 PROCEDURE — 96372 THER/PROPH/DIAG INJ SC/IM: CPT

## 2025-06-12 PROCEDURE — 90651 9VHPV VACCINE 2/3 DOSE IM: CPT | Performed by: OBSTETRICS & GYNECOLOGY

## 2025-06-12 RX ADMIN — HUMAN PAPILLOMAVIRUS 9-VALENT VACCINE, RECOMBINANT 0.5 ML: 30; 40; 60; 40; 20; 20; 20; 20; 20 INJECTION, SUSPENSION INTRAMUSCULAR at 01:06

## 2025-06-12 NOTE — PLAN OF CARE
Injection tolerated well-no adverse reactions-plan of care reviewed with pt-pt verbalizes understanding

## 2025-06-13 ENCOUNTER — CLINICAL SUPPORT (OUTPATIENT)
Dept: REHABILITATION | Facility: HOSPITAL | Age: 35
End: 2025-06-13
Attending: NURSE PRACTITIONER
Payer: MEDICAID

## 2025-06-13 DIAGNOSIS — E66.01 MORBID OBESITY: ICD-10-CM

## 2025-06-13 DIAGNOSIS — R29.898 IMPAIRED FLEXIBILITY OF LOWER EXTREMITY: ICD-10-CM

## 2025-06-13 DIAGNOSIS — M21.42 BILATERAL PES PLANUS: ICD-10-CM

## 2025-06-13 DIAGNOSIS — M25.562 CHRONIC PAIN OF LEFT KNEE: ICD-10-CM

## 2025-06-13 DIAGNOSIS — M21.41 BILATERAL PES PLANUS: ICD-10-CM

## 2025-06-13 DIAGNOSIS — M22.2X9 PATELLOFEMORAL STRESS SYNDROME, UNSPECIFIED LATERALITY: ICD-10-CM

## 2025-06-13 DIAGNOSIS — G89.29 CHRONIC BILATERAL LOW BACK PAIN, UNSPECIFIED WHETHER SCIATICA PRESENT: Primary | ICD-10-CM

## 2025-06-13 DIAGNOSIS — G89.29 CHRONIC PAIN OF LEFT KNEE: ICD-10-CM

## 2025-06-13 DIAGNOSIS — M17.12 PRIMARY OSTEOARTHRITIS OF LEFT KNEE: ICD-10-CM

## 2025-06-13 DIAGNOSIS — R29.898 DECREASED STRENGTH INVOLVING KNEE JOINT: ICD-10-CM

## 2025-06-13 DIAGNOSIS — M54.50 CHRONIC BILATERAL LOW BACK PAIN, UNSPECIFIED WHETHER SCIATICA PRESENT: Primary | ICD-10-CM

## 2025-06-13 PROCEDURE — 97530 THERAPEUTIC ACTIVITIES: CPT

## 2025-06-13 PROCEDURE — 97110 THERAPEUTIC EXERCISES: CPT

## 2025-06-13 NOTE — PROGRESS NOTES
Outpatient Rehab    Physical Therapy Visit    Patient Name: Nikky Roman  MRN: [id number]  YOB: 1990  Encounter Date: 6/13/2025    Therapy Diagnosis:   Encounter Diagnoses   Name Primary?    Morbid obesity     Chronic bilateral low back pain, unspecified whether sciatica present Yes    Chronic pain of left knee     Impaired flexibility of lower extremity     Decreased strength involving knee joint     Bilateral pes planus     Patellofemoral stress syndrome, unspecified laterality     Primary osteoarthritis of left knee      Physician: Vianey Banks NP    Physician Orders: Eval and Treat  Medical Diagnosis: Low back pain, unspecified  Unilateral primary osteoarthritis, left knee    Visit # / Visits Authorized:  10 / 12  Insurance Authorization Period: 4/30/2025 to 6/29/2025  Date of Evaluation: 4/28/2025  Plan of Care Certification: 4/28/2025 to 7/18/2025      PT/PTA: PT   Number of PTA visits since last PT visit:0  Time In: 1110   Time Out: 1145  Total Time (in minutes): 35   Total Billable Time (in minutes):      FOTO:  Intake Score:  %  Survey Score 2:  %  Survey Score 3:  %    Precautions:       Subjective   pt continues to c/o same pain level of 9/10 in low back and 5/10 in L knee with no medication taken for pain because she just woke up for the day.    LB- 9/10, L knee- 5/10    Objective            Treatment:  Therapeutic Exercise  TE 1: warm-up: NuStep UE and LE (see exercise sheet)  Modalities  Mechanical Traction: lumbar: 20 minutes, pull 80 lbs for 30 sec, rest 45 lbs for 10 sec      Time Entry(in minutes):  Therapeutic Activity Time Entry: 20  Therapeutic Exercise Time Entry: 15    Assessment & Plan   Assessment: pt arrived late to PT session. pt has not received lumbar decompression at chiropractor in over 1 month which pt reports provides temporary relief even though pt has consistently c/o 9/10 pain level in lumbar spine. Pt rode NuStep for cardiovascular endurance and lumbar  rotation for warm-up. Mechanical lumbar traction performed to facilitate reduction of pain and symptoms. Increased lumbar stiffness after traction  Evaluation/Treatment Tolerance: Patient tolerated treatment well    The patient will continue to benefit from skilled outpatient physical therapy in order to address the deficits listed in the problem list on the initial evaluation, provide patient and family education, and maximize the patients level of independence in the home and community environments.     The patient's spiritual, cultural, and educational needs were considered, and the patient is agreeable to the plan of care and goals.           Plan: continue with current POC, progress per pt's tolerance, prepare pt for upcoming discharge from PT    Goals:   Active       PT goals:        Reduce c/o pain in L knee and lumbar spine to <4/10 to improve tolerance with daily activities (Ongoing)       Start:  04/28/25    Expected End:  07/25/25            Improve strength in B LE to 4+/5 MMT throughout in order to improve tolerance with overall functional mobility  (Ongoing)       Start:  04/28/25    Expected End:  07/25/25            Improve flexibility of B LE in order to reduce mechanical stressors on the lumbar spine and Left knee (Ongoing)       Start:  04/28/25    Expected End:  07/25/25             Pt will be able to stand / walk for >30 minutes with < 4/10 pain level in order to complete daily activities including grocery shopping, household chores, etc (Ongoing)       Start:  04/28/25    Expected End:  07/25/25             Pt will demonstrate proper bending and lifting techniques to reduce stress on lumbar spine (Ongoing)       Start:  04/28/25    Expected End:  07/25/25               continued PT goals:        Pt will demonstrate knowledge and independence with HEP to continue with exercises outside of therapy to facilitate optimal overall improvements (Ongoing)       Start:  04/28/25    Expected End:   07/25/25            Pt will improve functional score on lumbar FOTO by >20 points which indicates improved ability to perform daily tasks with less difficulty and pain (Ongoing)       Start:  04/28/25    Expected End:  07/25/25             Pt will purchase arch supports for shoes to improve foot and knee alignment to reduce knee pain  (Ongoing)       Start:  04/28/25    Expected End:  07/25/25                Kayla Marcum, PT

## 2025-06-19 ENCOUNTER — HOSPITAL ENCOUNTER (EMERGENCY)
Facility: HOSPITAL | Age: 35
Discharge: HOME | End: 2025-06-19
Attending: STUDENT IN AN ORGANIZED HEALTH CARE EDUCATION/TRAINING PROGRAM
Payer: MEDICAID

## 2025-06-19 ENCOUNTER — DOCUMENTATION ONLY (OUTPATIENT)
Dept: REHABILITATION | Facility: HOSPITAL | Age: 35
End: 2025-06-19
Payer: MEDICAID

## 2025-06-19 VITALS
DIASTOLIC BLOOD PRESSURE: 87 MMHG | WEIGHT: 260 LBS | RESPIRATION RATE: 18 BRPM | HEART RATE: 92 BPM | OXYGEN SATURATION: 99 % | SYSTOLIC BLOOD PRESSURE: 147 MMHG | BODY MASS INDEX: 51.04 KG/M2 | TEMPERATURE: 98 F | HEIGHT: 60 IN

## 2025-06-19 DIAGNOSIS — E11.65 TYPE 2 DIABETES MELLITUS WITH HYPERGLYCEMIA, WITHOUT LONG-TERM CURRENT USE OF INSULIN: Primary | ICD-10-CM

## 2025-06-19 LAB
ALBUMIN SERPL-MCNC: 3.7 G/DL (ref 3.5–5)
ALBUMIN/GLOB SERPL: 0.9 RATIO (ref 1.1–2)
ALP SERPL-CCNC: 108 UNIT/L (ref 40–150)
ALT SERPL-CCNC: 20 UNIT/L (ref 0–55)
ANION GAP SERPL CALC-SCNC: 11 MEQ/L
ANION GAP SERPL CALC-SCNC: 7 MEQ/L
AST SERPL-CCNC: 13 UNIT/L (ref 11–45)
B-HCG UR QL: NEGATIVE
BACTERIA #/AREA URNS AUTO: ABNORMAL /HPF
BASOPHILS # BLD AUTO: 0.04 X10(3)/MCL
BASOPHILS NFR BLD AUTO: 0.4 %
BILIRUB SERPL-MCNC: 0.4 MG/DL
BILIRUB UR QL STRIP.AUTO: NEGATIVE
BUN SERPL-MCNC: 10.6 MG/DL (ref 7–18.7)
BUN SERPL-MCNC: 11.4 MG/DL (ref 7–18.7)
CALCIUM SERPL-MCNC: 7.9 MG/DL (ref 8.4–10.2)
CALCIUM SERPL-MCNC: 9.7 MG/DL (ref 8.4–10.2)
CHLORIDE SERPL-SCNC: 111 MMOL/L (ref 98–107)
CHLORIDE SERPL-SCNC: 99 MMOL/L (ref 98–107)
CLARITY UR: CLEAR
CO2 SERPL-SCNC: 20 MMOL/L (ref 22–29)
CO2 SERPL-SCNC: 21 MMOL/L (ref 22–29)
COLOR UR AUTO: ABNORMAL
CREAT SERPL-MCNC: 0.85 MG/DL (ref 0.55–1.02)
CREAT SERPL-MCNC: 1.5 MG/DL (ref 0.55–1.02)
CREAT/UREA NIT SERPL: 12
CREAT/UREA NIT SERPL: 8
EOSINOPHIL # BLD AUTO: 0.11 X10(3)/MCL (ref 0–0.9)
EOSINOPHIL NFR BLD AUTO: 1.2 %
ERYTHROCYTE [DISTWIDTH] IN BLOOD BY AUTOMATED COUNT: 14.9 % (ref 11.5–17)
GFR SERPLBLD CREATININE-BSD FMLA CKD-EPI: 46 ML/MIN/1.73/M2
GFR SERPLBLD CREATININE-BSD FMLA CKD-EPI: >60 ML/MIN/1.73/M2
GLOBULIN SER-MCNC: 4.3 GM/DL (ref 2.4–3.5)
GLUCOSE SERPL-MCNC: 338 MG/DL (ref 74–100)
GLUCOSE SERPL-MCNC: 700 MG/DL (ref 74–100)
GLUCOSE UR QL STRIP: >=1000
HCT VFR BLD AUTO: 31.7 % (ref 37–47)
HGB BLD-MCNC: 9.8 G/DL (ref 12–16)
HGB UR QL STRIP: ABNORMAL
IMM GRANULOCYTES # BLD AUTO: 0.02 X10(3)/MCL (ref 0–0.04)
IMM GRANULOCYTES NFR BLD AUTO: 0.2 %
KETONES UR QL STRIP: 15
LEUKOCYTE ESTERASE UR QL STRIP: NEGATIVE
LIPASE SERPL-CCNC: 18 U/L
LYMPHOCYTES # BLD AUTO: 2.48 X10(3)/MCL (ref 0.6–4.6)
LYMPHOCYTES NFR BLD AUTO: 26.5 %
MCH RBC QN AUTO: 26.8 PG (ref 27–31)
MCHC RBC AUTO-ENTMCNC: 30.9 G/DL (ref 33–36)
MCV RBC AUTO: 86.6 FL (ref 80–94)
MONOCYTES # BLD AUTO: 0.54 X10(3)/MCL (ref 0.1–1.3)
MONOCYTES NFR BLD AUTO: 5.8 %
NEUTROPHILS # BLD AUTO: 6.17 X10(3)/MCL (ref 2.1–9.2)
NEUTROPHILS NFR BLD AUTO: 65.9 %
NITRITE UR QL STRIP: NEGATIVE
PH UR STRIP: 6 [PH]
PLATELET # BLD AUTO: 303 X10(3)/MCL (ref 130–400)
PMV BLD AUTO: 11.6 FL (ref 7.4–10.4)
POCT GLUCOSE: 451 MG/DL (ref 70–110)
POCT GLUCOSE: >500 MG/DL (ref 70–110)
POTASSIUM SERPL-SCNC: 4 MMOL/L (ref 3.5–5.1)
POTASSIUM SERPL-SCNC: 5 MMOL/L (ref 3.5–5.1)
PROT SERPL-MCNC: 8 GM/DL (ref 6.4–8.3)
PROT UR QL STRIP: 30
RBC # BLD AUTO: 3.66 X10(6)/MCL (ref 4.2–5.4)
RBC #/AREA URNS AUTO: >100 /HPF
SODIUM SERPL-SCNC: 131 MMOL/L (ref 136–145)
SODIUM SERPL-SCNC: 138 MMOL/L (ref 136–145)
SP GR UR STRIP.AUTO: 1.01 (ref 1–1.03)
SQUAMOUS #/AREA URNS AUTO: ABNORMAL /HPF
UROBILINOGEN UR STRIP-ACNC: 0.2
WBC # BLD AUTO: 9.36 X10(3)/MCL (ref 4.5–11.5)
WBC #/AREA URNS AUTO: ABNORMAL /HPF

## 2025-06-19 PROCEDURE — 63600175 PHARM REV CODE 636 W HCPCS: Performed by: STUDENT IN AN ORGANIZED HEALTH CARE EDUCATION/TRAINING PROGRAM

## 2025-06-19 PROCEDURE — 82962 GLUCOSE BLOOD TEST: CPT

## 2025-06-19 PROCEDURE — 81025 URINE PREGNANCY TEST: CPT | Performed by: STUDENT IN AN ORGANIZED HEALTH CARE EDUCATION/TRAINING PROGRAM

## 2025-06-19 PROCEDURE — 96374 THER/PROPH/DIAG INJ IV PUSH: CPT

## 2025-06-19 PROCEDURE — 81003 URINALYSIS AUTO W/O SCOPE: CPT | Performed by: STUDENT IN AN ORGANIZED HEALTH CARE EDUCATION/TRAINING PROGRAM

## 2025-06-19 PROCEDURE — 99285 EMERGENCY DEPT VISIT HI MDM: CPT | Mod: 25

## 2025-06-19 PROCEDURE — 25000003 PHARM REV CODE 250: Performed by: STUDENT IN AN ORGANIZED HEALTH CARE EDUCATION/TRAINING PROGRAM

## 2025-06-19 PROCEDURE — 96361 HYDRATE IV INFUSION ADD-ON: CPT

## 2025-06-19 PROCEDURE — 96376 TX/PRO/DX INJ SAME DRUG ADON: CPT

## 2025-06-19 PROCEDURE — 96372 THER/PROPH/DIAG INJ SC/IM: CPT | Performed by: STUDENT IN AN ORGANIZED HEALTH CARE EDUCATION/TRAINING PROGRAM

## 2025-06-19 PROCEDURE — 85025 COMPLETE CBC W/AUTO DIFF WBC: CPT | Performed by: STUDENT IN AN ORGANIZED HEALTH CARE EDUCATION/TRAINING PROGRAM

## 2025-06-19 PROCEDURE — 80053 COMPREHEN METABOLIC PANEL: CPT | Performed by: STUDENT IN AN ORGANIZED HEALTH CARE EDUCATION/TRAINING PROGRAM

## 2025-06-19 PROCEDURE — 83690 ASSAY OF LIPASE: CPT | Performed by: STUDENT IN AN ORGANIZED HEALTH CARE EDUCATION/TRAINING PROGRAM

## 2025-06-19 RX ORDER — SODIUM CHLORIDE 9 MG/ML
1000 INJECTION, SOLUTION INTRAVENOUS
Status: COMPLETED | OUTPATIENT
Start: 2025-06-19 | End: 2025-06-19

## 2025-06-19 RX ORDER — METFORMIN HYDROCHLORIDE 500 MG/1
500 TABLET ORAL 2 TIMES DAILY WITH MEALS
Qty: 180 TABLET | Refills: 0 | Status: SHIPPED | OUTPATIENT
Start: 2025-06-19 | End: 2025-09-17

## 2025-06-19 RX ORDER — KETOROLAC TROMETHAMINE 30 MG/ML
30 INJECTION, SOLUTION INTRAMUSCULAR; INTRAVENOUS
Status: COMPLETED | OUTPATIENT
Start: 2025-06-19 | End: 2025-06-19

## 2025-06-19 RX ORDER — NAPROXEN SODIUM 550 MG/1
550 TABLET ORAL 2 TIMES DAILY WITH MEALS
Qty: 30 TABLET | Refills: 0 | Status: SHIPPED | OUTPATIENT
Start: 2025-06-19 | End: 2025-06-24

## 2025-06-19 RX ADMIN — SODIUM CHLORIDE 1000 ML: 9 INJECTION, SOLUTION INTRAVENOUS at 03:06

## 2025-06-19 RX ADMIN — KETOROLAC TROMETHAMINE 30 MG: 30 INJECTION, SOLUTION INTRAMUSCULAR at 01:06

## 2025-06-19 RX ADMIN — INSULIN HUMAN 10 UNITS: 100 INJECTION, SOLUTION PARENTERAL at 02:06

## 2025-06-19 RX ADMIN — SODIUM CHLORIDE 1000 ML: 9 INJECTION, SOLUTION INTRAVENOUS at 02:06

## 2025-06-19 RX ADMIN — INSULIN HUMAN 7 UNITS: 100 INJECTION, SOLUTION PARENTERAL at 04:06

## 2025-06-19 RX ADMIN — SODIUM CHLORIDE 1000 ML: 9 INJECTION, SOLUTION INTRAVENOUS at 04:06

## 2025-06-19 NOTE — ED PROVIDER NOTES
Encounter Date: 6/19/2025       History     Chief Complaint   Patient presents with    Flank Pain     Pt c/o bilat flank pain radiating to her abd x 2 days. Pt also c/o polydipsia and polyuria. Pt sts she is pre-diabetic. Afebrile     Patient is a 35-year-old  female with history of anemia who presented to the ER today due to right-sided flank pain.  Patient states she feels that the symptoms are consistent with her previous kidney stone.  She states it has been ongoing for the last 2 days.  States that has took ibuprofen for it without much relief.  Denies any diarrhea, constipation, nausea, vomiting, chest pain, shortness of breath.      Review of patient's allergies indicates:  No Known Allergies  Past Medical History:   Diagnosis Date    Chronic low back pain     Dysfunctional uterine bleeding     Iron deficiency anemia, unspecified     Morbid obesity      Past Surgical History:   Procedure Laterality Date    DILATION AND CURETTAGE OF UTERUS  01/2023     Family History   Problem Relation Name Age of Onset    Diabetes Father      Hypertension Father      Diabetes Maternal Grandmother      Hypertension Maternal Grandmother      Diabetes Paternal Grandmother      Hypertension Paternal Grandmother       Social History[1]  Review of Systems   Constitutional:  Negative for chills, fatigue and fever.   HENT:  Negative for congestion, sore throat and trouble swallowing.    Eyes:  Negative for pain and visual disturbance.   Respiratory:  Negative for cough, shortness of breath and wheezing.    Cardiovascular:  Negative for chest pain and palpitations.   Gastrointestinal:  Negative for abdominal pain, blood in stool, constipation, diarrhea, nausea and vomiting.   Genitourinary:  Positive for flank pain. Negative for dysuria and hematuria.   Musculoskeletal:  Negative for back pain and myalgias.   Skin:  Negative for rash and wound.   Neurological:  Negative for seizures, syncope and headaches.    Psychiatric/Behavioral:  Negative for confusion. The patient is not nervous/anxious.        Physical Exam     Initial Vitals [06/19/25 0018]   BP Pulse Resp Temp SpO2   (!) 174/95 94 18 98.2 °F (36.8 °C) 100 %      MAP       --         Physical Exam    Nursing note and vitals reviewed.  Constitutional: She appears well-developed and well-nourished. She is not diaphoretic. No distress.   HENT:   Head: Normocephalic.   Right Ear: External ear normal.   Left Ear: External ear normal.   Nose: Nose normal.   Eyes: Conjunctivae and EOM are normal. Right eye exhibits no discharge. Left eye exhibits no discharge. No scleral icterus.   Neck:   Normal range of motion.  Cardiovascular:  Normal rate, regular rhythm and normal heart sounds.     Exam reveals no gallop and no friction rub.       No murmur heard.  Pulmonary/Chest: Breath sounds normal. No stridor. No respiratory distress. She has no wheezes. She has no rhonchi. She has no rales.   Abdominal: Abdomen is soft. She exhibits no distension. There is no abdominal tenderness. There is no rebound and no guarding.   Musculoskeletal:         General: Normal range of motion.      Cervical back: Normal range of motion.     Neurological: She is alert.   Skin: Skin is warm. No rash noted. No erythema.   Psychiatric: She has a normal mood and affect. Her behavior is normal.         ED Course   Procedures  Labs Reviewed   URINALYSIS, REFLEX TO URINE CULTURE - Abnormal       Result Value    Color, UA Red (*)     Appearance, UA Clear      Specific Gravity, UA 1.010      pH, UA 6.0      Protein, UA 30 (*)     Glucose, UA >=1000 (*)     Ketones, UA 15 (*)     Blood, UA Large (*)     Bilirubin, UA Negative      Urobilinogen, UA 0.2      Nitrites, UA Negative      Leukocyte Esterase, UA Negative     COMPREHENSIVE METABOLIC PANEL - Abnormal    Sodium 131 (*)     Potassium 5.0      Chloride 99      CO2 21 (*)     Glucose 700 (*)     Blood Urea Nitrogen 11.4      Creatinine 1.50 (*)      Calcium 9.7      Protein Total 8.0      Albumin 3.7      Globulin 4.3 (*)     Albumin/Globulin Ratio 0.9 (*)     Bilirubin Total 0.4            ALT 20      AST 13      eGFR 46      Anion Gap 11.0      BUN/Creatinine Ratio 8     CBC WITH DIFFERENTIAL - Abnormal    WBC 9.36      RBC 3.66 (*)     Hgb 9.8 (*)     Hct 31.7 (*)     MCV 86.6      MCH 26.8 (*)     MCHC 30.9 (*)     RDW 14.9      Platelet 303      MPV 11.6 (*)     Neut % 65.9      Lymph % 26.5      Mono % 5.8      Eos % 1.2      Basophil % 0.4      Imm Grans % 0.2      Neut # 6.17      Lymph # 2.48      Mono # 0.54      Eos # 0.11      Baso # 0.04      Imm Gran # 0.02     URINALYSIS, MICROSCOPIC - Abnormal    Bacteria, UA None Seen      RBC, UA >100 (*)     WBC, UA None Seen      Squamous Epithelial Cells, UA None Seen     BASIC METABOLIC PANEL - Abnormal    Sodium 138      Potassium 4.0      Chloride 111 (*)     CO2 20 (*)     Glucose 338 (*)     Blood Urea Nitrogen 10.6      Creatinine 0.85      BUN/Creatinine Ratio 12      Calcium 7.9 (*)     Anion Gap 7.0      eGFR >60     POCT GLUCOSE - Abnormal    POCT Glucose >500 (*)    POCT GLUCOSE - Abnormal    POCT Glucose 451 (*)    PREGNANCY TEST, URINE RAPID - Normal    hCG Qualitative, Urine Negative     LIPASE - Normal    Lipase Level 18     CBC W/ AUTO DIFFERENTIAL    Narrative:     The following orders were created for panel order CBC Auto Differential.  Procedure                               Abnormality         Status                     ---------                               -----------         ------                     CBC with Differential[[id number]]       Abnormal            Final result                 Please view results for these tests on the individual orders.   POCT GLUCOSE, HAND-HELD DEVICE   POCT GLUCOSE MONITORING CONTINUOUS          Imaging Results              CT Renal Stone Study ABD Pelvis WO (Preliminary result)  Result time 06/19/25 01:49:01      Preliminary result by  Duncan Santos Jr., MD (06/19/25 01:49:01)                   Narrative:    START OF REPORT:  Technique: CT of the abdomen and pelvis was performed with axial images as well as sagittal and coronal reconstruction images without intravenous contrast renal stone protocol.    Comparison: None available.    Clinical History: Bilat flank pain.    Dosage Information: Automated Exposure Control was utilized.    Findings:  Lines and Tubes: None.  Thorax:  Lungs: The visualized lung bases appear unremarkable. No focal infiltrate or consolidation is seen.  Pleura: No effusions or thickening are seen. No pneumothorax is seen in the visualized lung bases.  Heart: The heart size is within normal limits.  Abdomen:  Abdominal Wall: No abdominal wall pathology is seen.  Liver: The liver appears unremarkable.  Biliary System: No intrahepatic or extrahepatic biliary duct dilatation is seen.  Gallbladder: The gallbladder appears unremarkable with no stones wall thickening or pericholecystic inflammatory changes or fluid.  Pancreas: The pancreas appears unremarkable. No pancreatic mass, or, ductal dilatation is seen.  Spleen: The spleen appears unremarkable.  Adrenals: The adrenal glands appear unremarkable.  Kidneys: The kidneys appear unremarkable with no stones cysts masses or hydronephrosis.  Aorta: The visualized abdominal aorta appears unremarkable.  IVC: Unremarkable.  Bowel:  Esophagus: The visualized distal esophagus appears unremarkable.  Stomach: The stomach appears unremarkable.  Duodenum: Unremarkable appearing duodenum.  Small Bowel: The small bowel appears unremarkable.  Colon: Nondistended.  Appendix: The appendix is not identified but no inflammatory changes are seen in the right lower quadrant to suggest appendicitis.  Peritoneum: No intraperitoneal free air or ascites is seen.    Pelvis:  Bladder: The bladder appears unremarkable.  Female:  Uterus: The uterus appears unremarkable.  Ovaries: The ovaries appear  unremarkable.    Bony structures:  Dorsal Spine: The visualized dorsal spine appears unremarkable.  Bony Pelvis: The visualized bony structures of the pelvis appear unremarkable.      Impression:  1. The kidneys appear unremarkable with no stones cysts masses or hydronephrosis.  2. No acute intraabdominal or pelvic solid organ or bowel pathology identified. Details and findings as discussed.                                         Medications   ketorolac injection 30 mg (30 mg Intramuscular Given 6/19/25 0121)   0.9% NaCl infusion (0 mLs Intravenous Stopped 6/19/25 0314)   0.9% NaCl infusion (0 mLs Intravenous Stopped 6/19/25 0426)   insulin regular injection 10 Units 0.1 mL (10 Units Intravenous Given 6/19/25 0222)   0.9% NaCl infusion (1,000 mLs Intravenous New Bag 6/19/25 0433)   insulin regular injection 7 Units 0.07 mL (7 Units Intravenous Given 6/19/25 0436)     Medical Decision Making  Amount and/or Complexity of Data Reviewed  Labs: ordered.  Radiology: ordered.    Risk  OTC drugs.  Prescription drug management.                                      Clinical Impression:  Final diagnoses:  [E11.65] Type 2 diabetes mellitus with hyperglycemia, without long-term current use of insulin (Primary)          ED Disposition Condition    Discharge Stable          ED Prescriptions       Medication Sig Dispense Start Date End Date Auth. Provider    metFORMIN (GLUCOPHAGE) 500 MG tablet Take 1 tablet (500 mg total) by mouth 2 (two) times daily with meals. 180 tablet 6/19/2025 9/17/2025 Michael Lora MD    naproxen sodium (ANAPROX) 550 MG tablet Take 1 tablet (550 mg total) by mouth 2 (two) times daily with meals. for 15 days 30 tablet 6/19/2025 7/4/2025 Michael Lora MD          Follow-up Information       Follow up With Specialties Details Why Contact Info    Marilee Jimenez, [facility] Schedule an appointment as soon as possible for a visit in 1 week  [address] [facility]  [address]  [phone]                     [1]   Social History  Tobacco Use    Smoking status: Never     Passive exposure: Current    Smokeless tobacco: Never   Substance Use Topics    Alcohol use: Not Currently     Alcohol/week: 2.0 standard drinks of alcohol     Types: 2 Cans of beer per week    Drug use: Never        Michael Lora MD  06/19/25 0645

## 2025-06-19 NOTE — PROGRESS NOTES
[facility]  Physical Therapy Discharge Note    Name: Nikky Roman  Clinic Number: [id number]    Medical diagnosis:  Low back pain, unspecified  Unilateral primary osteoarthritis, left knee  PT diagnosis: obesity, chronic lumbar and L knee pain, muscle tightness, decreased LE strength, B pes planus, patellofemoral syndrome     Date of Last visit: 6/13/25  Total Visits Received: 10 plus initial eval    Assessment      Nikky Roman did not return to physical therapy today for final assessment for discharge.  Nikky goals were address as listed below and met as stated.  Nikky was issued a home exercise program with handouts throughout this episode of care to reference for continued wellness and physical fitness . Contact information was given at evaluation in case any questions arise in the future or if therapy is needed.    Discharge reason: patient did not return for formal physical therapy. Pt no showed for scheduled last PT visit    Plan   This patient is discharged from Physical Therapy.

## 2025-06-22 ENCOUNTER — HOSPITAL ENCOUNTER (EMERGENCY)
Facility: HOSPITAL | Age: 35
Discharge: HOME | End: 2025-06-22
Attending: EMERGENCY MEDICINE
Payer: MEDICAID

## 2025-06-22 VITALS
WEIGHT: 260 LBS | DIASTOLIC BLOOD PRESSURE: 79 MMHG | HEIGHT: 60 IN | TEMPERATURE: 99 F | RESPIRATION RATE: 16 BRPM | SYSTOLIC BLOOD PRESSURE: 140 MMHG | BODY MASS INDEX: 51.04 KG/M2 | HEART RATE: 87 BPM | OXYGEN SATURATION: 98 %

## 2025-06-22 DIAGNOSIS — R10.30 LOWER ABDOMINAL PAIN: ICD-10-CM

## 2025-06-22 DIAGNOSIS — E11.65 HYPERGLYCEMIA DUE TO DIABETES MELLITUS: ICD-10-CM

## 2025-06-22 DIAGNOSIS — N93.8 DYSFUNCTIONAL UTERINE BLEEDING: Primary | ICD-10-CM

## 2025-06-22 DIAGNOSIS — G89.29 CHRONIC RIGHT-SIDED LOW BACK PAIN WITHOUT SCIATICA: ICD-10-CM

## 2025-06-22 DIAGNOSIS — M54.50 CHRONIC RIGHT-SIDED LOW BACK PAIN WITHOUT SCIATICA: ICD-10-CM

## 2025-06-22 LAB
ALBUMIN SERPL-MCNC: 3.8 G/DL (ref 3.5–5)
ALBUMIN/GLOB SERPL: 0.9 RATIO (ref 1.1–2)
ALP SERPL-CCNC: 95 UNIT/L (ref 40–150)
ALT SERPL-CCNC: 18 UNIT/L (ref 0–55)
ANION GAP SERPL CALC-SCNC: 17 MEQ/L
AST SERPL-CCNC: 17 UNIT/L (ref 11–45)
B-HCG UR QL: NEGATIVE
BACTERIA #/AREA URNS AUTO: ABNORMAL /HPF
BASOPHILS # BLD AUTO: 0.03 X10(3)/MCL
BASOPHILS NFR BLD AUTO: 0.3 %
BILIRUB SERPL-MCNC: 0.3 MG/DL
BILIRUB UR QL STRIP.AUTO: NEGATIVE
BUN SERPL-MCNC: 10.4 MG/DL (ref 7–18.7)
CALCIUM SERPL-MCNC: 9.2 MG/DL (ref 8.4–10.2)
CHLORIDE SERPL-SCNC: 102 MMOL/L (ref 98–107)
CLARITY UR: ABNORMAL
CO2 SERPL-SCNC: 15 MMOL/L (ref 22–29)
COLOR UR AUTO: ABNORMAL
CREAT SERPL-MCNC: 1.17 MG/DL (ref 0.55–1.02)
CREAT/UREA NIT SERPL: 9
EOSINOPHIL # BLD AUTO: 0.02 X10(3)/MCL (ref 0–0.9)
EOSINOPHIL NFR BLD AUTO: 0.2 %
ERYTHROCYTE [DISTWIDTH] IN BLOOD BY AUTOMATED COUNT: 14.7 % (ref 11.5–17)
GFR SERPLBLD CREATININE-BSD FMLA CKD-EPI: >60 ML/MIN/1.73/M2
GLOBULIN SER-MCNC: 4.2 GM/DL (ref 2.4–3.5)
GLUCOSE SERPL-MCNC: 436 MG/DL (ref 74–100)
GLUCOSE UR QL STRIP: >=1000
HCT VFR BLD AUTO: 30.4 % (ref 37–47)
HGB BLD-MCNC: 9.7 G/DL (ref 12–16)
HGB UR QL STRIP: ABNORMAL
IMM GRANULOCYTES # BLD AUTO: 0.03 X10(3)/MCL (ref 0–0.04)
IMM GRANULOCYTES NFR BLD AUTO: 0.3 %
KETONES UR QL STRIP: >=80
LEUKOCYTE ESTERASE UR QL STRIP: NEGATIVE
LYMPHOCYTES # BLD AUTO: 1.92 X10(3)/MCL (ref 0.6–4.6)
LYMPHOCYTES NFR BLD AUTO: 16.7 %
MCH RBC QN AUTO: 27.3 PG (ref 27–31)
MCHC RBC AUTO-ENTMCNC: 31.9 G/DL (ref 33–36)
MCV RBC AUTO: 85.6 FL (ref 80–94)
MONOCYTES # BLD AUTO: 0.7 X10(3)/MCL (ref 0.1–1.3)
MONOCYTES NFR BLD AUTO: 6.1 %
NEUTROPHILS # BLD AUTO: 8.77 X10(3)/MCL (ref 2.1–9.2)
NEUTROPHILS NFR BLD AUTO: 76.4 %
NITRITE UR QL STRIP: NEGATIVE
PH UR STRIP: 5.5 [PH]
PLATELET # BLD AUTO: 320 X10(3)/MCL (ref 130–400)
PMV BLD AUTO: 11.1 FL (ref 7.4–10.4)
POCT GLUCOSE: 368 MG/DL (ref 70–110)
POCT GLUCOSE: 379 MG/DL (ref 70–110)
POCT GLUCOSE: 406 MG/DL (ref 70–110)
POCT GLUCOSE: 461 MG/DL (ref 70–110)
POTASSIUM SERPL-SCNC: 5.3 MMOL/L (ref 3.5–5.1)
PROT SERPL-MCNC: 8 GM/DL (ref 6.4–8.3)
PROT UR QL STRIP: 100
RBC # BLD AUTO: 3.55 X10(6)/MCL (ref 4.2–5.4)
RBC #/AREA URNS AUTO: >100 /HPF
SODIUM SERPL-SCNC: 134 MMOL/L (ref 136–145)
SP GR UR STRIP.AUTO: 1.02 (ref 1–1.03)
SQUAMOUS #/AREA URNS AUTO: ABNORMAL /HPF
UROBILINOGEN UR STRIP-ACNC: 0.2
WBC # BLD AUTO: 11.47 X10(3)/MCL (ref 4.5–11.5)
WBC #/AREA URNS AUTO: ABNORMAL /HPF

## 2025-06-22 PROCEDURE — 85025 COMPLETE CBC W/AUTO DIFF WBC: CPT | Performed by: EMERGENCY MEDICINE

## 2025-06-22 PROCEDURE — 25000003 PHARM REV CODE 250: Performed by: EMERGENCY MEDICINE

## 2025-06-22 PROCEDURE — 96361 HYDRATE IV INFUSION ADD-ON: CPT

## 2025-06-22 PROCEDURE — 96375 TX/PRO/DX INJ NEW DRUG ADDON: CPT

## 2025-06-22 PROCEDURE — 81025 URINE PREGNANCY TEST: CPT | Performed by: EMERGENCY MEDICINE

## 2025-06-22 PROCEDURE — 87086 URINE CULTURE/COLONY COUNT: CPT | Performed by: EMERGENCY MEDICINE

## 2025-06-22 PROCEDURE — 82962 GLUCOSE BLOOD TEST: CPT

## 2025-06-22 PROCEDURE — 81003 URINALYSIS AUTO W/O SCOPE: CPT | Performed by: EMERGENCY MEDICINE

## 2025-06-22 PROCEDURE — 96376 TX/PRO/DX INJ SAME DRUG ADON: CPT

## 2025-06-22 PROCEDURE — 99284 EMERGENCY DEPT VISIT MOD MDM: CPT | Mod: 25

## 2025-06-22 PROCEDURE — 63600175 PHARM REV CODE 636 W HCPCS: Performed by: EMERGENCY MEDICINE

## 2025-06-22 PROCEDURE — 96374 THER/PROPH/DIAG INJ IV PUSH: CPT

## 2025-06-22 PROCEDURE — 80053 COMPREHEN METABOLIC PANEL: CPT | Performed by: EMERGENCY MEDICINE

## 2025-06-22 RX ORDER — HYDROMORPHONE HYDROCHLORIDE 2 MG/ML
0.5 INJECTION, SOLUTION INTRAMUSCULAR; INTRAVENOUS; SUBCUTANEOUS
Refills: 0 | Status: COMPLETED | OUTPATIENT
Start: 2025-06-22 | End: 2025-06-22

## 2025-06-22 RX ORDER — METHOCARBAMOL 100 MG/ML
1000 INJECTION, SOLUTION INTRAMUSCULAR; INTRAVENOUS
Status: COMPLETED | OUTPATIENT
Start: 2025-06-22 | End: 2025-06-22

## 2025-06-22 RX ORDER — HYDROMORPHONE HYDROCHLORIDE 2 MG/ML
0.5 INJECTION, SOLUTION INTRAMUSCULAR; INTRAVENOUS; SUBCUTANEOUS
Status: COMPLETED | OUTPATIENT
Start: 2025-06-22 | End: 2025-06-22

## 2025-06-22 RX ORDER — METHOCARBAMOL 750 MG/1
1500 TABLET, FILM COATED ORAL 3 TIMES DAILY
Qty: 30 TABLET | Refills: 0 | Status: SHIPPED | OUTPATIENT
Start: 2025-06-22 | End: 2025-06-27

## 2025-06-22 RX ADMIN — INSULIN HUMAN 10 UNITS: 100 INJECTION, SOLUTION PARENTERAL at 01:06

## 2025-06-22 RX ADMIN — SODIUM CHLORIDE 1000 ML: 9 INJECTION, SOLUTION INTRAVENOUS at 12:06

## 2025-06-22 RX ADMIN — INSULIN HUMAN 10 UNITS: 100 INJECTION, SOLUTION PARENTERAL at 11:06

## 2025-06-22 RX ADMIN — HYDROMORPHONE HYDROCHLORIDE 0.5 MG: 2 INJECTION, SOLUTION INTRAMUSCULAR; INTRAVENOUS; SUBCUTANEOUS at 01:06

## 2025-06-22 RX ADMIN — METHOCARBAMOL 1000 MG: 100 INJECTION INTRAMUSCULAR; INTRAVENOUS at 11:06

## 2025-06-22 RX ADMIN — HYDROMORPHONE HYDROCHLORIDE 0.5 MG: 2 INJECTION, SOLUTION INTRAMUSCULAR; INTRAVENOUS; SUBCUTANEOUS at 11:06

## 2025-06-22 NOTE — ED NOTES
Pt d/c home with mom; educated pt on importance of taking metformin and monitoring her sugars. Pt also educated on diabetes management. Pt verbalized understanding.

## 2025-06-22 NOTE — ED PROVIDER NOTES
Encounter Date: 6/22/2025       History     Chief Complaint   Patient presents with    Abdominal Pain     Abd pain x 3  days. Pt has hx of uterine polyp. Vaginal bleeding x 3 months. Gyn apt next month.      This 35-year-old female presents with complaints of low abdominal pain for the past 3 days, low back pain which is chronic and sometimes radiates down into her leg on the right, vaginal bleeding for the past 3 months which is irregular.  She has a gynecology appointment next month.  She also reports a history of uterine polyp.  In the last month she has had both a CT of her abdomen and pelvis noncontrast and an ultrasound of her pelvis with no acute intra-abdominal or pelvic pathology noted.       Review of patient's allergies indicates:  No Known Allergies  Past Medical History:   Diagnosis Date    Chronic low back pain     Dysfunctional uterine bleeding     Iron deficiency anemia, unspecified     Morbid obesity      Past Surgical History:   Procedure Laterality Date    DILATION AND CURETTAGE OF UTERUS  01/2023     Family History   Problem Relation Name Age of Onset    Diabetes Father      Hypertension Father      Diabetes Maternal Grandmother      Hypertension Maternal Grandmother      Diabetes Paternal Grandmother      Hypertension Paternal Grandmother       Social History[1]  Review of Systems   Constitutional:  Negative for fever.   HENT:  Negative for sore throat.    Respiratory:  Negative for shortness of breath.    Cardiovascular:  Negative for chest pain.   Gastrointestinal:  Positive for abdominal pain (low abdominal pain). Negative for nausea.   Genitourinary:  Positive for menstrual problem and vaginal bleeding. Negative for dysuria.   Musculoskeletal:  Positive for back pain.   Skin:  Negative for rash.   Neurological:  Negative for weakness.   Hematological:  Does not bruise/bleed easily.       Physical Exam     Initial Vitals [06/22/25 1025]   BP Pulse Resp Temp SpO2   132/83 62 16 98.5 °F (36.9  °C) 98 %      MAP       --         Physical Exam    Nursing note and vitals reviewed.  Constitutional: She appears well-developed and well-nourished.   HENT:   Head: Normocephalic and atraumatic.   Eyes: Conjunctivae and EOM are normal. Pupils are equal, round, and reactive to light.   Neck: Neck supple.   Normal range of motion.  Cardiovascular:  Normal rate, regular rhythm, normal heart sounds and intact distal pulses.           Pulmonary/Chest: Breath sounds normal.   Abdominal: Abdomen is soft. Bowel sounds are normal.   Musculoskeletal:         General: Normal range of motion.      Cervical back: Normal range of motion and neck supple.     Neurological: She is alert and oriented to person, place, and time. She has normal strength.   Skin: Skin is warm and dry. Capillary refill takes less than 2 seconds.   Psychiatric: She has a normal mood and affect. Her behavior is normal. Judgment and thought content normal.         ED Course   Procedures  Labs Reviewed   URINALYSIS, REFLEX TO URINE CULTURE - Abnormal       Result Value    Color, UA Red (*)     Appearance, UA Turbid (*)     Specific Gravity, UA 1.025      pH, UA 5.5      Protein,  (*)     Glucose, UA >=1000 (*)     Ketones, UA >=80 (*)     Blood, UA Large (*)     Bilirubin, UA Negative      Urobilinogen, UA 0.2      Nitrites, UA Negative      Leukocyte Esterase, UA Negative      Narrative:     Specimen color may interfere with dipstick results.     URINALYSIS, MICROSCOPIC - Abnormal    Bacteria, UA Few (*)     RBC, UA >100 (*)     WBC, UA 11-20 (*)     Squamous Epithelial Cells, UA Rare     COMPREHENSIVE METABOLIC PANEL - Abnormal    Sodium 134 (*)     Potassium 5.3 (*)     Chloride 102      CO2 15 (*)     Glucose 436 (*)     Blood Urea Nitrogen 10.4      Creatinine 1.17 (*)     Calcium 9.2      Protein Total 8.0      Albumin 3.8      Globulin 4.2 (*)     Albumin/Globulin Ratio 0.9 (*)     Bilirubin Total 0.3      ALP 95      ALT 18      AST 17       eGFR >60      Anion Gap 17.0      BUN/Creatinine Ratio 9     CBC WITH DIFFERENTIAL - Abnormal    WBC 11.47      RBC 3.55 (*)     Hgb 9.7 (*)     Hct 30.4 (*)     MCV 85.6      MCH 27.3      MCHC 31.9 (*)     RDW 14.7      Platelet 320      MPV 11.1 (*)     Neut % 76.4      Lymph % 16.7      Mono % 6.1      Eos % 0.2      Basophil % 0.3      Imm Grans % 0.3      Neut # 8.77      Lymph # 1.92      Mono # 0.70      Eos # 0.02      Baso # 0.03      Imm Gran # 0.03     POCT GLUCOSE - Abnormal    POCT Glucose 461 (*)    POCT GLUCOSE - Abnormal    POCT Glucose 406 (*)    POCT GLUCOSE - Abnormal    POCT Glucose 379 (*)    POCT GLUCOSE - Abnormal    POCT Glucose 368 (*)    PREGNANCY TEST, URINE RAPID - Normal    hCG Qualitative, Urine Negative     CULTURE, URINE   CBC W/ AUTO DIFFERENTIAL    Narrative:     The following orders were created for panel order CBC auto differential.  Procedure                               Abnormality         Status                     ---------                               -----------         ------                     CBC with Differential[[id number]]       Abnormal            Final result                 Please view results for these tests on the individual orders.          Imaging Results    None          Medications   insulin regular injection 10 Units 0.1 mL (has no administration in time range)   HYDROmorphone (PF) injection 0.5 mg (0.5 mg Intravenous Given 6/22/25 1121)   methocarbamoL injection 1,000 mg (1,000 mg Intravenous Given 6/22/25 1132)   insulin regular injection 10 Units 0.1 mL (10 Units Intravenous Given 6/22/25 1126)   sodium chloride 0.9% bolus 1,000 mL 1,000 mL (0 mLs Intravenous Stopped 6/22/25 1303)   HYDROmorphone (PF) injection 0.5 mg (0.5 mg Intravenous Given 6/22/25 1300)     Medical Decision Making  Amount and/or Complexity of Data Reviewed  Labs: ordered.    Risk  OTC drugs.  Prescription drug management.                                      Clinical  Impression:  Final diagnoses:  [N93.8] Dysfunctional uterine bleeding (Primary)  [R10.30] Lower abdominal pain  [M54.50, G89.29] Chronic right-sided low back pain without sciatica  [E11.65] Hyperglycemia due to diabetes mellitus          ED Disposition Condition    Discharge Stable          ED Prescriptions       Medication Sig Dispense Start Date End Date Auth. Provider    methocarbamoL (ROBAXIN) 750 MG Tab Take 2 tablets (1,500 mg total) by mouth 3 (three) times daily. for 5 days 30 tablet 6/22/2025 6/27/2025 Michael Lora MD          Follow-up Information       Follow up With Specialties Details Why Contact Info    Marilee Jimenez, P Family Medicine Schedule an appointment as soon as possible for a visit in 1 week  [address] [facility] [address] [phone]      Keep your appointment with your gynecologist.                         [1]   Social History  Tobacco Use    Smoking status: Never     Passive exposure: Current    Smokeless tobacco: Never   Substance Use Topics    Alcohol use: Not Currently     Alcohol/week: 2.0 standard drinks of alcohol     Types: 2 Cans of beer per week    Drug use: Never        Michael Lora MD  06/22/25 6554

## 2025-06-22 NOTE — ED NOTES
"Pt presents to ER with c/o uterine pain and right lower back pain; reports hx of uterine polyps and chronic vaginal bleeding. Pt also reports she is a new diabetic - found out on 6/19 and was d/c home on Metformin but hasn't taken it because "I'm taking my pain medication". Glucose 461.   "

## 2025-06-24 ENCOUNTER — HOSPITAL ENCOUNTER (EMERGENCY)
Facility: HOSPITAL | Age: 35
Discharge: HOME | End: 2025-06-25
Attending: SPECIALIST
Payer: MEDICAID

## 2025-06-24 VITALS
RESPIRATION RATE: 18 BRPM | HEART RATE: 71 BPM | OXYGEN SATURATION: 98 % | BODY MASS INDEX: 43.32 KG/M2 | TEMPERATURE: 98 F | DIASTOLIC BLOOD PRESSURE: 89 MMHG | SYSTOLIC BLOOD PRESSURE: 155 MMHG | WEIGHT: 260 LBS | HEIGHT: 65 IN

## 2025-06-24 DIAGNOSIS — R10.9 ABDOMINAL CRAMPING: Primary | ICD-10-CM

## 2025-06-24 LAB — BACTERIA UR CULT: NORMAL

## 2025-06-24 PROCEDURE — 96372 THER/PROPH/DIAG INJ SC/IM: CPT | Performed by: SPECIALIST

## 2025-06-24 PROCEDURE — 99284 EMERGENCY DEPT VISIT MOD MDM: CPT | Mod: 25

## 2025-06-24 PROCEDURE — 63600175 PHARM REV CODE 636 W HCPCS: Mod: JZ,TB | Performed by: SPECIALIST

## 2025-06-24 RX ORDER — NAPROXEN SODIUM 550 MG/1
550 TABLET ORAL 2 TIMES DAILY WITH MEALS
Qty: 30 TABLET | Refills: 0 | Status: SHIPPED | OUTPATIENT
Start: 2025-06-24 | End: 2025-06-24

## 2025-06-24 RX ORDER — HYDROCODONE BITARTRATE AND ACETAMINOPHEN 7.5; 325 MG/1; MG/1
1 TABLET ORAL EVERY 6 HOURS PRN
Qty: 2 TABLET | Refills: 0 | Status: SHIPPED | OUTPATIENT
Start: 2025-06-24

## 2025-06-24 RX ORDER — KETOROLAC TROMETHAMINE 30 MG/ML
60 INJECTION, SOLUTION INTRAMUSCULAR; INTRAVENOUS
Status: COMPLETED | OUTPATIENT
Start: 2025-06-24 | End: 2025-06-24

## 2025-06-24 RX ADMIN — KETOROLAC TROMETHAMINE 60 MG: 30 INJECTION, SOLUTION INTRAMUSCULAR at 11:06

## 2025-06-25 NOTE — ED PROVIDER NOTES
Encounter Date: 6/24/2025       History     Chief Complaint   Patient presents with    Abdominal Cramping     Lower abd cramping from uterine growth. GYN appt in AM. States she is unable o eat, drink, or sleep due to pain. Muscle relaxer is not helping.      35-year-old female presents with lower abdominal cramping from a uterine polyp; states she has an appointment with a gynecologist in the morning but it is hurting so bad she can not eat or drink or sleep; she states the muscle relaxer she was given 2 days ago is not helping; she is asking for IV Dilaudid because that helps; she has a history of chronic low back pain, DUB, iron-deficiency    The history is provided by the patient.     Review of patient's allergies indicates:  No Known Allergies  Past Medical History:   Diagnosis Date    Chronic low back pain     Dysfunctional uterine bleeding     Iron deficiency anemia, unspecified     Morbid obesity      Past Surgical History:   Procedure Laterality Date    DILATION AND CURETTAGE OF UTERUS  01/2023     Family History   Problem Relation Name Age of Onset    Diabetes Father      Hypertension Father      Diabetes Maternal Grandmother      Hypertension Maternal Grandmother      Diabetes Paternal Grandmother      Hypertension Paternal Grandmother       Social History[1]  Review of Systems   Constitutional: Negative.    HENT: Negative.     Respiratory: Negative.     Cardiovascular: Negative.    Gastrointestinal: Negative.    Genitourinary:  Positive for vaginal bleeding.   Musculoskeletal: Negative.    Skin: Negative.    Neurological: Negative.    All other systems reviewed and are negative.      Physical Exam     Initial Vitals [06/24/25 2234]   BP Pulse Resp Temp SpO2   (!) 155/89 71 18 97.6 °F (36.4 °C) 98 %      MAP       --         Physical Exam    Nursing note and vitals reviewed.  Constitutional: She appears well-developed and well-nourished. No distress.   HENT:   Head: Normocephalic and atraumatic.   Eyes:  EOM are normal. Pupils are equal, round, and reactive to light.   Neck: Neck supple.   Normal range of motion.  Cardiovascular:  Normal rate, regular rhythm, normal heart sounds and intact distal pulses.           Pulmonary/Chest: Breath sounds normal.   Abdominal: Abdomen is soft. Bowel sounds are normal. She exhibits no distension. There is abdominal tenderness (Minimal, pelvic). There is no rebound.   Musculoskeletal:         General: Normal range of motion.      Cervical back: Normal range of motion and neck supple.     Neurological: She is alert and oriented to person, place, and time. She has normal strength. GCS score is 15. GCS eye subscore is 4. GCS verbal subscore is 5. GCS motor subscore is 6.   Skin: Skin is warm and dry.         ED Course   Procedures  Labs Reviewed - No data to display       Imaging Results    None          Medications   ketorolac injection 60 mg (60 mg Intramuscular Given 6/24/25 7719)     Medical Decision Making  35-year-old female presents with lower abdominal cramping from a uterine polyp; states she has an appointment with a gynecologist in the morning but it is hurting so bad she can not eat or drink or sleep; she states the muscle relaxer she was given 2 days ago is not helping; she is asking for IV Dilaudid because that helps; she has a history of chronic low back pain, DUB, iron-deficiency    DIFFERENTIAL DIAGNOSIS- uterine polyp and abdominal cramping, malingering, drug-seeking behavior    Risk  Prescription drug management.  Risk Details: Patient appears to be comfortable and generally uterine polyps do not cause pain or maybe mild discomfort if there near the uterine cervix; patient is resting comfortably with a normal heart rate and unremarkable abdominal exam; she will be given Toradol 60 mg IM and a prescription will be sent to her pharmacy for Naprosyn, she has an appointment with a gynecologist in the morning    Patient refused Naprosyn; she drove her car and states  she can go get a medication that will help for pain so prescribed Norco 7.5 #2 tablets as she has an appointment in the morning and can ask for medication from her primary care gynecologist                                      Clinical Impression:  Final diagnoses:  [R10.9] Abdominal cramping (Primary)          ED Disposition Condition    Discharge Stable          ED Prescriptions       Medication Sig Dispense Start Date End Date Auth. Provider    naproxen sodium (ANAPROX) 550 MG tablet  (Status: Discontinued) Take 1 tablet (550 mg total) by mouth 2 (two) times daily with meals. for 15 days 30 tablet 6/24/2025 6/24/2025 Israel Holly MD    naproxen sodium (ANAPROX) 550 MG tablet  (Status: Discontinued) Take 1 tablet (550 mg total) by mouth 2 (two) times daily with meals. for 15 days 30 tablet 6/24/2025 6/24/2025 Israel Holly MD    HYDROcodone-acetaminophen (NORCO) 7.5-325 mg per tablet Take 1 tablet by mouth every 6 (six) hours as needed for Pain. 2 tablet 6/24/2025 -- Israel Holly MD          Follow-up Information       Follow up With Specialties Details Why Contact Info    Gynecology                     Israel Holly MD  06/24/25 2960         [1]   Social History  Tobacco Use    Smoking status: Never     Passive exposure: Current    Smokeless tobacco: Never   Substance Use Topics    Alcohol use: Not Currently     Alcohol/week: 2.0 standard drinks of alcohol     Types: 2 Cans of beer per week    Drug use: Never        Israel Holly MD  06/25/25 3375

## 2025-07-16 ENCOUNTER — HOSPITAL ENCOUNTER (OUTPATIENT)
Dept: RADIOLOGY | Facility: HOSPITAL | Age: 35
Discharge: HOME | End: 2025-07-16
Attending: OBSTETRICS & GYNECOLOGY
Payer: MEDICAID

## 2025-07-16 DIAGNOSIS — Z01.812 PRE-OPERATIVE LABORATORY EXAMINATION: ICD-10-CM

## 2025-07-16 PROCEDURE — 71046 X-RAY EXAM CHEST 2 VIEWS: CPT | Mod: TC

## 2025-07-17 ENCOUNTER — TELEPHONE (OUTPATIENT)
Dept: INTERNAL MEDICINE | Facility: CLINIC | Age: 35
End: 2025-07-17
Payer: MEDICAID

## 2025-07-17 NOTE — TELEPHONE ENCOUNTER
Received call requesting a sooner appointment , was recently diagnosed new diabetes . States blood sugar running in the 400's.Informed will receive a call back to schedule a hospital follow up.  She voiced understanding.

## 2025-07-25 ENCOUNTER — OFFICE VISIT (OUTPATIENT)
Dept: INTERNAL MEDICINE | Facility: CLINIC | Age: 35
End: 2025-07-25
Payer: MEDICAID

## 2025-07-25 ENCOUNTER — CLINICAL SUPPORT (OUTPATIENT)
Dept: INTERNAL MEDICINE | Facility: CLINIC | Age: 35
End: 2025-07-25
Attending: NURSE PRACTITIONER
Payer: MEDICAID

## 2025-07-25 VITALS
BODY MASS INDEX: 45.35 KG/M2 | WEIGHT: 231 LBS | HEIGHT: 60 IN | HEART RATE: 86 BPM | SYSTOLIC BLOOD PRESSURE: 127 MMHG | DIASTOLIC BLOOD PRESSURE: 83 MMHG | TEMPERATURE: 99 F | RESPIRATION RATE: 18 BRPM

## 2025-07-25 DIAGNOSIS — E11.9 NEW ONSET TYPE 2 DIABETES MELLITUS: Primary | ICD-10-CM

## 2025-07-25 DIAGNOSIS — N92.6 IRREGULAR MENSES: ICD-10-CM

## 2025-07-25 DIAGNOSIS — R14.0 ABDOMINAL BLOATING: ICD-10-CM

## 2025-07-25 DIAGNOSIS — R79.89 ABNORMAL CBC: ICD-10-CM

## 2025-07-25 DIAGNOSIS — K21.9 GASTROESOPHAGEAL REFLUX DISEASE, UNSPECIFIED WHETHER ESOPHAGITIS PRESENT: ICD-10-CM

## 2025-07-25 LAB — HBA1C MFR BLD: 13.6 %

## 2025-07-25 PROCEDURE — 83036 HEMOGLOBIN GLYCOSYLATED A1C: CPT | Mod: PBBFAC | Performed by: NURSE PRACTITIONER

## 2025-07-25 PROCEDURE — 99215 OFFICE O/P EST HI 40 MIN: CPT | Mod: PBBFAC,25 | Performed by: NURSE PRACTITIONER

## 2025-07-25 PROCEDURE — 92228 IMG RTA DETC/MNTR DS PHY/QHP: CPT | Mod: PBBFAC | Performed by: NURSE PRACTITIONER

## 2025-07-25 PROCEDURE — 99214 OFFICE O/P EST MOD 30 MIN: CPT | Mod: S$PBB,,, | Performed by: NURSE PRACTITIONER

## 2025-07-25 RX ORDER — GLIPIZIDE 5 MG/1
5 TABLET, FILM COATED, EXTENDED RELEASE ORAL DAILY
Qty: 90 TABLET | Refills: 1 | Status: SHIPPED | OUTPATIENT
Start: 2025-07-25 | End: 2026-07-25

## 2025-07-25 RX ORDER — FAMOTIDINE 20 MG/1
20 TABLET, FILM COATED ORAL 2 TIMES DAILY
Qty: 60 TABLET | Refills: 6 | Status: SHIPPED | OUTPATIENT
Start: 2025-07-25 | End: 2026-07-25

## 2025-07-25 RX ORDER — INSULIN PUMP SYRINGE, 3 ML
EACH MISCELLANEOUS
Qty: 1 EACH | Refills: 0 | Status: SHIPPED | OUTPATIENT
Start: 2025-07-25

## 2025-07-25 RX ORDER — METFORMIN HYDROCHLORIDE 500 MG/1
500 TABLET ORAL 2 TIMES DAILY WITH MEALS
Qty: 180 TABLET | Refills: 1 | Status: SHIPPED | OUTPATIENT
Start: 2025-07-25 | End: 2026-07-25

## 2025-07-25 RX ORDER — LANCETS
EACH MISCELLANEOUS
Qty: 100 EACH | Refills: 6 | Status: SHIPPED | OUTPATIENT
Start: 2025-07-25

## 2025-07-25 NOTE — PROGRESS NOTES
Nikky Roman is a 35 y.o. female here for a diabetic eye screening with non-dilated fundus photos per Marilee Jimenez np.    Patient cooperative?: Yes  Small pupils?: Yes  Last eye exam: unknown    For exam results, see Encounter Report.

## 2025-07-25 NOTE — PROGRESS NOTES
Patient ID: [id number]     Chief Complaint: Diabetes        HPI:     HPI      Nikky Roman is a 35 y.o. female here today for a follow up.  Pt last seen in my clinic 1-23-25. Pt recently diagnosed with New Onset DM per ER visit 6-19-25. Pt states DM runs in her family. Pt denies eating high carb diet        Immunizations:   Immunization History   Administered Date(s) Administered    DTP 1989, 1989, 02/06/1990, 08/21/1990, 02/25/1994    HIB 08/21/1990    HPV 9-Valent 04/17/2025, 06/12/2025    Hepatitis B 08/03/2001, 09/07/2001    Hepatitis B, Pediatric/Adolescent 08/07/2003    MMR 08/21/1990, 02/25/1994    OPV 1989, 1989, 08/21/1990, 02/25/1994    Td (ADULT) 09/07/2001        -------------------------------------    Chronic low back pain    Diabetes mellitus    Dysfunctional uterine bleeding    Iron deficiency anemia, unspecified    Morbid obesity        Past Surgical History:   Procedure Laterality Date    DILATION AND CURETTAGE OF UTERUS  01/2023       Review of patient's allergies indicates:  No Known Allergies    Current Outpatient Medications   Medication Instructions    albuterol (PROAIR HFA) 90 mcg/actuation inhaler 2 puffs, Inhalation, Every 6 hours PRN, Rescue    chlorhexidine (PERIDEX) 0.12 % solution SMARTSIG:By Mouth    cholecalciferol (vitamin D3) (VITAMIN D3) 2,000 Units, Oral, Daily    diclofenac (VOLTAREN) 75 mg, Oral, 2 times daily with meals    FeroSuL 325 mg, Oral, 2 times daily    ferrous sulfate 325 mg, Oral, Daily    HYDROcodone-acetaminophen (NORCO) 7.5-325 mg per tablet 1 tablet, Oral, Every 6 hours PRN    metFORMIN (GLUCOPHAGE) 500 mg, Oral, 2 times daily with meals    ondansetron (ZOFRAN) 4 mg, Oral, Every 6 hours    SLYND 4 mg, Oral, Daily       Social History[1]     Family History   Problem Relation Name Age of Onset    Diabetes Father      Hypertension Father      Diabetes Maternal Grandmother      Hypertension Maternal Grandmother      Diabetes Paternal  Grandmother      Hypertension Paternal Grandmother          Patient Care Team:  Marilee Jimenez FNP as PCP - General (Family Medicine)  Margaret Hansen LPN as Care Coordinator  Sukh Davis MD as Consulting Physician (Obstetrics and Gynecology)     Subjective:     Review of Systems     See HPI for details    Constitutional: Denies Change in appetite. Denies Chills. Denies Fever. Denies Night sweats.  Eye: Denies Blurred vision. Denies Discharge. Denies Eye pain.  ENT: Denies Decreased hearing. Denies Sore throat. Denies Swollen glands.  Respiratory: Denies Cough. Denies Shortness of breath. Denies Shortness of breath with exertion. Denies Wheezing.  Cardiovascular: DeniesChest pain at rest. Denies Chest pain with exertion. Denies Irregular heartbeat. Denies Palpitations. Denies Edema.  Gastrointestinal: Denies Abdominal pain. DeniesDiarrhea. Denies Nausea. Denies Vomiting. Denies Hematemesis or Hematochezia.  Genitourinary: Denies Dysuria. Denies Urinary frequency. Denies Urinary urgency. Denies Blood in urine.  Endocrine: Denies Cold intolerance. Denies Excessive thirst. Denies Heat intolerance. Denies Weight loss. Denies Weight gain.  Musculoskeletal: Denies Painful joints. Denies Weakness.  Integumentary: Denies Rash. Denies Itching. Denies Dry skin.  Neurologic: Denies Dizziness. Denies Fainting. Denies Headache.  Psychiatric: Denies Depression. Denies Anxiety. Denies Suicidal/Homicidal ideations.    All Other ROS: Negative except as stated in HPI.       Objective:     Visit Vitals  /83   Pulse 86   Temp 98.7 °F (37.1 °C) (Oral)   Resp 18   Ht 5' (1.524 m)   Wt 104.8 kg (231 lb)   LMP 06/25/2025 (Approximate)   BMI 45.11 kg/m²       Physical Exam    General: Alert and oriented, No acute distress.  Head: Normocephalic, Atraumatic.  Eye: Pupils are equal, round and reactive to light, Extraocular movements are intact, Sclera non-icteric.  Ears/Nose/Throat: Normal, Mucosa  moist,Clear.  Neck/Thyroid: Supple, Non-tender, No carotid bruit, No lymphadenopathy, No JVD, Full range of motion.  Respiratory: Clear to auscultation bilaterally; No wheezes, rales or rhonchi,Non-labored respirations, Symmetrical chest wall expansion.  Cardiovascular: Regular rate and rhythm, S1/S2 normal, No murmurs, rubs or gallops.  Gastrointestinal: Soft, Non-tender, Non-distended, Normal bowel sounds, No palpable organomegaly.  Musculoskeletal: Normal range of motion.  Integumentary: Warm, Dry, Intact, No suspicious lesions or rashes.  Extremities: No clubbing, cyanosis or edema  Neurologic: No focal deficits, Cranial Nerves II-XII are grossly intact, Motor strength normal upper and lower extremities, Sensory exam intact.  Psychiatric: Normal interaction, Coherent speech, Euthymic mood, Appropriate affect       Labs Reviewed:     Chemistry:  Lab Results   Component Value Date     07/16/2025    K 4.1 07/16/2025    BUN 7.1 07/16/2025    CREATININE 0.92 07/16/2025    EGFRNORACEVR >60 07/16/2025    CALCIUM 8.7 07/16/2025    ALKPHOS 94 07/16/2025    ALBUMIN 3.5 07/16/2025    BILIDIR 0.2 12/07/2021    IBILI 0.20 12/07/2021    AST 16 07/16/2025    ALT 19 07/16/2025    MG 2.0 07/24/2017    [id number] 14 (L) 12/16/2024        Lab Results   Component Value Date    HGBA1C 5.3 03/08/2024        Hematology:  Lab Results   Component Value Date    WBC 5.54 07/16/2025    HGB 9.5 (L) 07/16/2025    HCT 32.8 (L) 07/16/2025     07/16/2025       Lipid Panel:  Lab Results   Component Value Date    CHOL 125 02/09/2024    HDL 38 02/09/2024    LDL 69.00 02/09/2024    TRIG 91 02/09/2024    TOTALCHOLEST 3 02/09/2024        Urine:  Lab Results   Component Value Date    APPEARANCEUA Turbid (A) 06/22/2025    SGUA 1.025 06/22/2025    PROTEINUA 100 (A) 06/22/2025    KETONESUA >=80 (A) 06/22/2025    LEUKOCYTESUR Negative 06/22/2025    RBCUA >100 (A) 06/22/2025    WBCUA 11-20 (A) 06/22/2025    BACTERIA Few (A) 06/22/2025     SQEPUA Many (A) 02/09/2024    HYALINECASTS None Seen 02/09/2024        Assessment:       ICD-10-CM ICD-9-CM   1. New onset type 2 diabetes mellitus  E11.9 250.00   2. Abnormal CBC  R79.89 790.6   3. Irregular menses  N92.6 626.4   4. Gastroesophageal reflux disease, unspecified whether esophagitis present  K21.9 530.81   5. Abdominal bloating  R14.0 787.3        Plan:     1. New onset type 2 diabetes mellitus (Primary)  POC A1c today- 13.6. ADA diet and exercise. Pt currently on Metformin 500 mg 1 tab po BID. Cont as prescribed. Will add Glipizide 5 mg XR 1 tab po daily with meal. Will add Januvia 25 mg 1 tab po daily. Urine microalbumin with next labs. DM foot exam done today. DM eye exam done today. RX for meter and supplies sent to pt's pharmacy to check CBG once daily and keep log.  - POCT HEMOGLOBIN A1C    Protective Sensation (w/ 10 gram monofilament):  Right: Intact  Left: Intact    Visual Inspection:  Normal -  Bilateral    Pedal Pulses:   Right: Present  Left: Present    Posterior Tibialis Pulses:   Right:Present  Left: Present     2. Abnormal CBC  CBC stable. CBC in 4 months.     3. Irregular menses  Pt followed by her private OBGYN Dr Gillis. Keep appts.      4. GERD  Avoid triggers. RX Famotidine 20 mg 1 tab po BID.     5. Abd bloating  Will get H pylori stool test next available.       Follow up in about 1 month (around 8/25/2025) for for f/u DM. In addition to their scheduled follow up, the patient has also been instructed to follow up on as needed basis.     Future Appointments   Date Time Provider Department Center   9/3/2025  9:15 AM Charles Gillis MD [facility]   9/30/2025  9:00 AM CHAIR 01, [facility] INFUSION [facility]   3/18/2026 10:15 AM Charles Gillis MD [facility]        EVA Nichols             [1]   Social History  Socioeconomic History    Marital status: Single    Number of children: 0   Tobacco Use    Smoking status: Never     Passive exposure:  Current    Smokeless tobacco: Never   Vaping Use    Vaping status: Never Used   Substance and Sexual Activity    Alcohol use: Not Currently     Alcohol/week: 2.0 standard drinks of alcohol     Types: 2 Cans of beer per week    Drug use: Never    Sexual activity: Yes     Partners: Male     Social Drivers of Health     Financial Resource Strain: Patient Declined (6/13/2024)    Overall Financial Resource Strain (CARDIA)     Difficulty of Paying Living Expenses: Patient declined   Food Insecurity: Patient Declined (6/13/2024)    Hunger Vital Sign     Worried About Running Out of Food in the Last Year: Patient declined     Ran Out of Food in the Last Year: Patient declined   Transportation Needs: Unmet Transportation Needs (4/3/2023)    PRAPARE - Transportation     Lack of Transportation (Medical): Yes     Lack of Transportation (Non-Medical): Yes   Physical Activity: Insufficiently Active (6/13/2024)    Exercise Vital Sign     Days of Exercise per Week: 1 day     Minutes of Exercise per Session: 10 min   Stress: No Stress Concern Present (6/13/2024)    [employer] [place] [employer] - Occupational Stress Questionnaire     Feeling of Stress : Not at all   Housing Stability: Unknown (6/13/2024)    Housing Stability Vital Sign     Unable to Pay for Housing in the Last Year: Patient declined

## 2025-08-01 ENCOUNTER — LAB VISIT (OUTPATIENT)
Dept: LAB | Facility: HOSPITAL | Age: 35
End: 2025-08-01
Attending: NURSE PRACTITIONER
Payer: MEDICAID

## 2025-08-01 DIAGNOSIS — K21.9 GASTROESOPHAGEAL REFLUX DISEASE, UNSPECIFIED WHETHER ESOPHAGITIS PRESENT: ICD-10-CM

## 2025-08-01 DIAGNOSIS — E11.9 NEW ONSET TYPE 2 DIABETES MELLITUS: ICD-10-CM

## 2025-08-01 DIAGNOSIS — R14.0 ABDOMINAL BLOATING: ICD-10-CM

## 2025-08-01 PROCEDURE — 87338 HPYLORI STOOL AG IA: CPT

## 2025-08-01 PROCEDURE — 82985 ASSAY OF GLYCATED PROTEIN: CPT

## 2025-08-01 PROCEDURE — 36415 COLL VENOUS BLD VENIPUNCTURE: CPT

## 2025-08-04 DIAGNOSIS — K21.9 GASTROESOPHAGEAL REFLUX DISEASE, UNSPECIFIED WHETHER ESOPHAGITIS PRESENT: Primary | ICD-10-CM

## 2025-08-04 LAB — H. PYLORI STOOL: POSITIVE

## 2025-08-05 ENCOUNTER — CLINICAL SUPPORT (OUTPATIENT)
Dept: DIABETES | Facility: CLINIC | Age: 35
End: 2025-08-05
Payer: MEDICAID

## 2025-08-05 VITALS — WEIGHT: 232.31 LBS | BODY MASS INDEX: 45.37 KG/M2

## 2025-08-05 DIAGNOSIS — A04.8 H. PYLORI INFECTION: ICD-10-CM

## 2025-08-05 DIAGNOSIS — N76.0 VAGINOSIS: Primary | ICD-10-CM

## 2025-08-05 DIAGNOSIS — E11.9 NEW ONSET TYPE 2 DIABETES MELLITUS: ICD-10-CM

## 2025-08-05 LAB — FRUCTOSAMINE SERPL-SCNC: 438 MCMOL/L (ref 200–285)

## 2025-08-05 PROCEDURE — G0108 DIAB MANAGE TRN  PER INDIV: HCPCS | Mod: ,,, | Performed by: INTERNAL MEDICINE

## 2025-08-05 RX ORDER — FLUCONAZOLE 150 MG/1
150 TABLET ORAL ONCE
Qty: 2 TABLET | Refills: 0 | Status: SHIPPED | OUTPATIENT
Start: 2025-08-05 | End: 2025-08-05

## 2025-08-05 RX ORDER — OMEPRAZOLE 20 MG/1
CAPSULE, DELAYED RELEASE ORAL
Qty: 90 CAPSULE | OUTPATIENT
Start: 2025-08-05

## 2025-08-05 NOTE — TELEPHONE ENCOUNTER
Patient requested a prescription for her yeast infection. Informed patient a prescription for Diflucan was sent to her pharmacy. Patient verbalized understanding.

## 2025-08-05 NOTE — PROGRESS NOTES
Diabetes Care Specialist Progress Note  Author: Malathi Mcnaircordelia  Date: 8/5/2025    Intake    Program Intake  Reason for Diabetes Program Visit:: Initial Diabetes Assessment  Current diabetes risk level:: high  In the last month, have you used the ER or been admitted to the hospital: Yes  Was the ER or hospital admission related to diabetes?: Yes  Permission to speak with others about care:: no    Current Diabetes Treatment: Oral Medications  Oral Medication Type/Dose: Metformin 500mg BID      Jardiance 25 mg Q day (patient takes at night)  Glipizide 5 mg Q day  (pt. takes in morning)    Continuous Glucose Monitoring  Patient has CGM: No    Lab Results   Component Value Date    HGBA1C 5.3 03/08/2024       Weight: 105.4 kg (232 lb 4.8 oz)       Body mass index is 45.37 kg/m².    Lifestyle Coping Support & Clinical    Lifestyle/Coping/Support  Compared to other people your age, how would you rate your health?: Good  Does anyone in your family have diabetes or does anyone in your family support you in your diabetes care?: very strong history/  List anything about Diabetes that causes you stress?: complications  How do you deal with stress/distress?: walks her dog  Learning Barriers:: None  Culture or Restorationism beliefs that may impact ability to access healthcare: No  Psychosocial/Coping Skills Assessment Completed: : Yes  Assessment indicates:: Adequate understanding  Area of need?: No       Diabetes Self-Management Skills Assessment    Medication Skills Assessment  Patient is able to identify current diabetes medications, dosages, and appropriate timing of medications.: yes  Patient reports problems or concerns with current medication regimen.: no  Patient is  aware that some diabetes medications can cause low blood sugar?: Yes  Medication Skills Assessment Completed:: Yes  Assessment indicates:: Adequate understanding  Area of need?: No    Diabetes Disease Process/Treatment Options  Diabetes Type?: Type II  When were  you diagnosed?: Recently/last month/July 2025  What are your goals for this education session?: food intake/what do i do to take care of myself  Is patient aware of what causes diabetes?: Yes  Does patient understand the pathophysiology of diabetes?: Yes  Diabetes Disease Process/Treatment Options: Skills Assessment Completed: Yes  Assessment indicates:: Adequate understanding  Area of need?: No    Nutrition/Healthy Eating  Meal Plan 24 Hour Recall - Breakfast: brunch - Bia rice/chicken or shrimp  Meal Plan 24 Hour Recall - Dinner: chicken or shrimp  no pork  no fatty foods  collards/cabbage  Meal Plan 24 Hour Recall - Beverage: water/coke - zero sugar  Who shops/cooks?: self  Patient can identify foods that impact blood sugar.: yes  Nutrition/Healthy Eating Skills Assessment Completed:: Yes  Assessment indicates:: Adequate understanding  Area of need?: No    Physical Activity/Exercise  Patient's daily activity level:: sedentary  Patient formally exercises outside of work.: no  Patient can identify forms of physical activity.: yes  Physical Activity/Exercise Skills Assessment Completed: : Yes  Assessment indicates:: Instruction Needed  Area of need?: Yes    Home Blood Glucose Monitoring  Patient states that blood sugar is checked at home daily.: yes  Monitoring Method:: home glucometer  Home Blood Glucose Monitoring Skills Assessment Completed: : Yes  Assessment indicates:: Knowledge deficit  Area of need?: Yes    Acute Complications  Have you ever had hypoglycemia (low BG 70 or less)?: no  Have you ever had hyperglycemia (high  or more)?: yes  Have you ever had DKA?: no  Do you ever test for ketones?: no  Do you have a sick day plan?: no  Acute Complications Skills Assessment Completed: : Yes  Area of need?: Deferred    Chronic Complications  Reviewed health maintenance: yes  Have you completed your annual diabetes maintenance labwork? : yes  Do you examine your feet daily?: no  Has your doctor examined  your feet?: no  Do you see a Dentist?: yes  Dentist date of last visit:: Q 6 months  Do you see an eye doctor?: yes  Eye doctor date of last visit:: only as needed  Chronic Complications Skills Assessment Completed: : Yes  Assessment indicates:: Knowledge deficit  Area of need?: Deferred  Assessment Summary and Plan    Based on today's diabetes care assessment, the following areas of need were identified:      Identified Areas of Need      Medication/Current Diabetes Treatment: No   Lifestyle Coping/Support: No   Diabetes Disease Process/Treatment Options: No   Nutrition/Healthy Eating: No    Physical Activity/Exercise: Yes see care plan/add seated exercises   Home Blood Glucose Monitoring: Yes see care plan/vary time   Acute Complications: Deferred    Chronic Complications: Deferred       Today's interventions were provided through individual discussion, instruction, and written materials were provided.      Patient verbalized understanding of instruction and written materials.  Pt was able to return back demonstration of instructions today. Patient understood key points, needs reinforcement and further instruction.     Diabetes Self-Management Care Plan:    Today's Diabetes Self-Management Care Plan was developed with Nikky's input. Nikky has agreed to work toward the following goal(s) to improve his/her overall diabetes control.      Care Plan: Diabetes Management   Updates made since 8/5/2024 12:00 AM     Problem: Physical Activity and Exercise      Goal: Patient agrees to increase physical activity to a goal of 3 times per week for 10 to 15 minutes.    Start Date: 8/5/2025   Expected End Date: 8/20/2025   Priority: High     Task: Discussed role of physical activity on reducing insulin resistance and improvement in overall glycemic control. Completed 8/5/2025        Task: Discussed role of physical activity as it relates to weight loss Completed 8/5/2025        Task: Offered suggestions on how patient  could increase their regular physical activity Completed 8/5/2025        Problem: Blood Glucose Self-Monitoring      Goal: Patient agrees to check and record blood sugars 2 times per day and vary the time    Start Date: 8/5/2025   Expected End Date: 8/20/2025   Priority: High        Task: Reviewed the importance of self-monitoring blood glucose and keeping logs. Completed 8/5/2025        Task: Provided patient with blood glucose logs, reviewed appropriate timing and frequency to SMBG, education on parameters on when to notify provider and advised patient to bring logs to all appts with PCP/Endocrinologist/Diabetes Care Specialist. Completed 8/5/2025   Follow Up Plan     Follow up in about 15 days (around 8/20/2025) for food journal follow jup/cbg review.    Today's care plan and follow up schedule was discussed with patient.  Nikky verbalized understanding of the care plan, goals, and agrees to follow up plan.        The patient was encouraged to communicate with his/her health care provider/physician and care team regarding his/her condition(s) and treatment.  I provided the patient with my contact information today and encouraged to contact me via phone or [facility]'s Patient Portal as needed.     Length of Visit   Total Time: 60 Minutes

## 2025-08-18 ENCOUNTER — TELEPHONE (OUTPATIENT)
Dept: INTERNAL MEDICINE | Facility: CLINIC | Age: 35
End: 2025-08-18
Payer: MEDICAID

## 2025-08-20 ENCOUNTER — TELEPHONE (OUTPATIENT)
Dept: INTERNAL MEDICINE | Facility: CLINIC | Age: 35
End: 2025-08-20
Payer: MEDICAID

## 2025-08-20 ENCOUNTER — CLINICAL SUPPORT (OUTPATIENT)
Dept: DIABETES | Facility: CLINIC | Age: 35
End: 2025-08-20
Payer: MEDICAID

## 2025-08-20 VITALS — WEIGHT: 236.88 LBS | BODY MASS INDEX: 46.27 KG/M2

## 2025-08-20 DIAGNOSIS — E11.9 NEW ONSET TYPE 2 DIABETES MELLITUS: Primary | ICD-10-CM

## 2025-08-28 ENCOUNTER — PATIENT MESSAGE (OUTPATIENT)
Dept: DIABETES | Facility: CLINIC | Age: 35
End: 2025-08-28
Payer: MEDICAID

## 2025-08-29 ENCOUNTER — OFFICE VISIT (OUTPATIENT)
Dept: INTERNAL MEDICINE | Facility: CLINIC | Age: 35
End: 2025-08-29
Payer: MEDICAID

## 2025-08-29 VITALS
RESPIRATION RATE: 18 BRPM | HEIGHT: 60 IN | OXYGEN SATURATION: 100 % | BODY MASS INDEX: 45.68 KG/M2 | DIASTOLIC BLOOD PRESSURE: 76 MMHG | SYSTOLIC BLOOD PRESSURE: 110 MMHG | WEIGHT: 232.69 LBS | HEART RATE: 57 BPM | TEMPERATURE: 97 F

## 2025-08-29 DIAGNOSIS — E55.9 VITAMIN D DEFICIENCY: ICD-10-CM

## 2025-08-29 DIAGNOSIS — E11.9 NEW ONSET TYPE 2 DIABETES MELLITUS: Primary | ICD-10-CM

## 2025-08-29 DIAGNOSIS — D64.9 ANEMIA, UNSPECIFIED TYPE: ICD-10-CM

## 2025-08-29 LAB — HBA1C MFR BLD: 10.9 %

## 2025-08-29 PROCEDURE — 99213 OFFICE O/P EST LOW 20 MIN: CPT | Mod: PBBFAC | Performed by: NURSE PRACTITIONER

## 2025-08-29 RX ORDER — FERROUS SULFATE 325(65) MG
325 TABLET, DELAYED RELEASE (ENTERIC COATED) ORAL DAILY
Qty: 30 TABLET | Refills: 3 | Status: SHIPPED | OUTPATIENT
Start: 2025-08-29

## 2025-08-29 RX ORDER — METFORMIN HYDROCHLORIDE 1000 MG/1
1000 TABLET ORAL 2 TIMES DAILY WITH MEALS
Qty: 180 TABLET | Refills: 1 | Status: SHIPPED | OUTPATIENT
Start: 2025-08-29 | End: 2026-08-29